# Patient Record
Sex: MALE | Race: BLACK OR AFRICAN AMERICAN | Employment: OTHER | URBAN - METROPOLITAN AREA
[De-identification: names, ages, dates, MRNs, and addresses within clinical notes are randomized per-mention and may not be internally consistent; named-entity substitution may affect disease eponyms.]

---

## 2017-01-04 ENCOUNTER — OFFICE VISIT (OUTPATIENT)
Dept: INTERNAL MEDICINE CLINIC | Age: 67
End: 2017-01-04

## 2017-01-04 VITALS
BODY MASS INDEX: 25.6 KG/M2 | DIASTOLIC BLOOD PRESSURE: 84 MMHG | WEIGHT: 193.2 LBS | TEMPERATURE: 98.4 F | HEIGHT: 73 IN | SYSTOLIC BLOOD PRESSURE: 140 MMHG | HEART RATE: 72 BPM

## 2017-01-04 DIAGNOSIS — I10 ESSENTIAL HYPERTENSION: ICD-10-CM

## 2017-01-04 DIAGNOSIS — Z23 ENCOUNTER FOR IMMUNIZATION: Primary | ICD-10-CM

## 2017-01-04 DIAGNOSIS — E55.9 VITAMIN D INSUFFICIENCY: ICD-10-CM

## 2017-01-04 DIAGNOSIS — E11.9 TYPE 2 DIABETES MELLITUS WITHOUT COMPLICATION, WITHOUT LONG-TERM CURRENT USE OF INSULIN (HCC): ICD-10-CM

## 2017-01-04 DIAGNOSIS — R22.31 ARM MASS, RIGHT: ICD-10-CM

## 2017-01-04 DIAGNOSIS — Z80.42 FAMILY HISTORY OF PROSTATE CANCER: ICD-10-CM

## 2017-01-04 DIAGNOSIS — I82.432 DEEP VEIN THROMBOSIS (DVT) OF POPLITEAL VEIN OF LEFT LOWER EXTREMITY, UNSPECIFIED CHRONICITY (HCC): ICD-10-CM

## 2017-01-04 DIAGNOSIS — Z12.5 SCREENING PSA (PROSTATE SPECIFIC ANTIGEN): ICD-10-CM

## 2017-01-04 DIAGNOSIS — E78.5 HYPERLIPIDEMIA, UNSPECIFIED HYPERLIPIDEMIA TYPE: ICD-10-CM

## 2017-01-04 DIAGNOSIS — K21.9 GASTROESOPHAGEAL REFLUX DISEASE, ESOPHAGITIS PRESENCE NOT SPECIFIED: ICD-10-CM

## 2017-01-04 NOTE — PATIENT INSTRUCTIONS
Please monitor and record your blood pressure every morning for the next 2 weeks prior to taking your medications. Please deliver record of readings to our office for review. Learning About Diabetes Food Guidelines  Your Care Instructions  Meal planning is important to manage diabetes. It helps keep your blood sugar at a target level (which you set with your doctor). You don't have to eat special foods. You can eat what your family eats, including sweets once in a while. But you do have to pay attention to how often you eat and how much you eat of certain foods. You may want to work with a dietitian or a certified diabetes educator (CDE) to help you plan meals and snacks. A dietitian or CDE can also help you lose weight if that is one of your goals. What should you know about eating carbs? Managing the amount of carbohydrate (carbs) you eat is an important part of healthy meals when you have diabetes. Carbohydrate is found in many foods. · Learn which foods have carbs. And learn the amounts of carbs in different foods. ¨ Bread, cereal, pasta, and rice have about 15 grams of carbs in a serving. A serving is 1 slice of bread (1 ounce), ½ cup of cooked cereal, or 1/3 cup of cooked pasta or rice. ¨ Fruits have 15 grams of carbs in a serving. A serving is 1 small fresh fruit, such as an apple or orange; ½ of a banana; ½ cup of cooked or canned fruit; ½ cup of fruit juice; 1 cup of melon or raspberries; or 2 tablespoons of dried fruit. ¨ Milk and no-sugar-added yogurt have 15 grams of carbs in a serving. A serving is 1 cup of milk or 2/3 cup of no-sugar-added yogurt. ¨ Starchy vegetables have 15 grams of carbs in a serving. A serving is ½ cup of mashed potatoes or sweet potato; 1 cup winter squash; ½ of a small baked potato; ½ cup of cooked beans; or ½ cup cooked corn or green peas.   · Learn how much carbs to eat each day and at each meal. A dietitian or CDE can teach you how to keep track of the amount of carbs you eat. This is called carbohydrate counting. · If you are not sure how to count carbohydrate grams, use the Plate Method to plan meals. It is a good, quick way to make sure that you have a balanced meal. It also helps you spread carbs throughout the day. ¨ Divide your plate by types of foods. Put non-starchy vegetables on half the plate, meat or other protein food on one-quarter of the plate, and a grain or starchy vegetable in the final quarter of the plate. To this you can add a small piece of fruit and 1 cup of milk or yogurt, depending on how many carbs you are supposed to eat at a meal.  · Try to eat about the same amount of carbs at each meal. Do not \"save up\" your daily allowance of carbs to eat at one meal.  · Proteins have very little or no carbs per serving. Examples of proteins are beef, chicken, turkey, fish, eggs, tofu, cheese, cottage cheese, and peanut butter. A serving size of meat is 3 ounces, which is about the size of a deck of cards. Examples of meat substitute serving sizes (equal to 1 ounce of meat) are 1/4 cup of cottage cheese, 1 egg, 1 tablespoon of peanut butter, and ½ cup of tofu. How can you eat out and still eat healthy? · Learn to estimate the serving sizes of foods that have carbohydrate. If you measure food at home, it will be easier to estimate the amount in a serving of restaurant food. · If the meal you order has too much carbohydrate (such as potatoes, corn, or baked beans), ask to have a low-carbohydrate food instead. Ask for a salad or green vegetables. · If you use insulin, check your blood sugar before and after eating out to help you plan how much to eat in the future. · If you eat more carbohydrate at a meal than you had planned, take a walk or do other exercise. This will help lower your blood sugar. What else should you know? · Limit saturated fat, such as the fat from meat and dairy products.  This is a healthy choice because people who have diabetes are at higher risk of heart disease. So choose lean cuts of meat and nonfat or low-fat dairy products. Use olive or canola oil instead of butter or shortening when cooking. · Don't skip meals. Your blood sugar may drop too low if you skip meals and take insulin or certain medicines for diabetes. · Check with your doctor before you drink alcohol. Alcohol can cause your blood sugar to drop too low. Alcohol can also cause a bad reaction if you take certain diabetes medicines. Follow-up care is a key part of your treatment and safety. Be sure to make and go to all appointments, and call your doctor if you are having problems. It's also a good idea to know your test results and keep a list of the medicines you take. Where can you learn more? Go to http://yanet-sergio.info/. Enter L563 in the search box to learn more about \"Learning About Diabetes Food Guidelines. \"  Current as of: May 23, 2016  Content Version: 11.1  © 1663-4349 [employer]. Care instructions adapted under license by [employer] (which disclaims liability or warranty for this information). If you have questions about a medical condition or this instruction, always ask your healthcare professional. [address] any warranty or liability for your use of this information. DASH Diet: Care Instructions  Your Care Instructions  The DASH diet is an eating plan that can help lower your blood pressure. DASH stands for Dietary Approaches to Stop Hypertension. Hypertension is high blood pressure. The DASH diet focuses on eating foods that are high in calcium, potassium, and magnesium. These nutrients can lower blood pressure. The foods that are highest in these nutrients are fruits, vegetables, low-fat dairy products, nuts, seeds, and legumes. But taking calcium, potassium, and magnesium supplements instead of eating foods that are high in those nutrients does not have the same effect.  The DASH diet also includes whole grains, fish, and poultry. The DASH diet is one of several lifestyle changes your doctor may recommend to lower your high blood pressure. Your doctor may also want you to decrease the amount of sodium in your diet. Lowering sodium while following the DASH diet can lower blood pressure even further than just the DASH diet alone. Follow-up care is a key part of your treatment and safety. Be sure to make and go to all appointments, and call your doctor if you are having problems. It's also a good idea to know your test results and keep a list of the medicines you take. How can you care for yourself at home? Following the DASH diet  · Eat 4 to 5 servings of fruit each day. A serving is 1 medium-sized piece of fruit, ½ cup chopped or canned fruit, 1/4 cup dried fruit, or 4 ounces (½ cup) of fruit juice. Choose fruit more often than fruit juice. · Eat 4 to 5 servings of vegetables each day. A serving is 1 cup of lettuce or raw leafy vegetables, ½ cup of chopped or cooked vegetables, or 4 ounces (½ cup) of vegetable juice. Choose vegetables more often than vegetable juice. · Get 2 to 3 servings of low-fat and fat-free dairy each day. A serving is 8 ounces of milk, 1 cup of yogurt, or 1 ½ ounces of cheese. · Eat 6 to 8 servings of grains each day. A serving is 1 slice of bread, 1 ounce of dry cereal, or ½ cup of cooked rice, pasta, or cooked cereal. Try to choose whole-grain products as much as possible. · Limit lean meat, poultry, and fish to 2 servings each day. A serving is 3 ounces, about the size of a deck of cards. · Eat 4 to 5 servings of nuts, seeds, and legumes (cooked dried beans, lentils, and split peas) each week. A serving is 1/3 cup of nuts, 2 tablespoons of seeds, or ½ cup of cooked beans or peas. · Limit fats and oils to 2 to 3 servings each day. A serving is 1 teaspoon of vegetable oil or 2 tablespoons of salad dressing.   · Limit sweets and added sugars to 5 servings or less a week. A serving is 1 tablespoon jelly or jam, ½ cup sorbet, or 1 cup of lemonade. · Eat less than 2,300 milligrams (mg) of sodium a day. If you limit your sodium to 1,500 mg a day, you can lower your blood pressure even more. Tips for success  · Start small. Do not try to make dramatic changes to your diet all at once. You might feel that you are missing out on your favorite foods and then be more likely to not follow the plan. Make small changes, and stick with them. Once those changes become habit, add a few more changes. · Try some of the following:  ¨ Make it a goal to eat a fruit or vegetable at every meal and at snacks. This will make it easy to get the recommended amount of fruits and vegetables each day. ¨ Try yogurt topped with fruit and nuts for a snack or healthy dessert. ¨ Add lettuce, tomato, cucumber, and onion to sandwiches. ¨ Combine a ready-made pizza crust with low-fat mozzarella cheese and lots of vegetable toppings. Try using tomatoes, squash, spinach, broccoli, carrots, cauliflower, and onions. ¨ Have a variety of cut-up vegetables with a low-fat dip as an appetizer instead of chips and dip. ¨ Sprinkle sunflower seeds or chopped almonds over salads. Or try adding chopped walnuts or almonds to cooked vegetables. ¨ Try some vegetarian meals using beans and peas. Add garbanzo or kidney beans to salads. Make burritos and tacos with mashed blake beans or black beans. Where can you learn more? Go to http://yanet-sergio.info/. Enter H679 in the search box to learn more about \"DASH Diet: Care Instructions. \"  Current as of: March 23, 2016  Content Version: 11.1  © 5874-8111 [employer]. Care instructions adapted under license by [employer] (which disclaims liability or warranty for this information).  If you have questions about a medical condition or this instruction, always ask your healthcare professional. Jennifer Solitario disclaims any warranty or liability for your use of this information.

## 2017-01-04 NOTE — PROGRESS NOTES
1. Have you been to the ER, urgent care clinic or hospitalized since your last visit? NO.     2. Have you seen or consulted any other health care providers outside of the [address] [place] [address] since your last visit (Include any pap smears or colon screening)? YES      Do you have an Advanced Directive? NO    Would you like information on Advanced Directives?  YES

## 2017-01-04 NOTE — MR AVS SNAPSHOT
Visit Information Date & Time Provider Department Dept. Phone Encounter #  
 1/4/2017  9:00 AM Vince Goldberg, MD Internist of [facility]  Follow-up Instructions Return in about 6 months (around 7/4/2017), or if symptoms worsen or fail to improve. Your Appointments 7/5/2017  8:25 AM  
LAB with [facility] NURSE VISIT Internist of [facility] ([facility]) Appt Note: lab  
 [address], [address] [id number] [address] [place]  
  
   
 [address], [address]  
  
    
 7/12/2017  8:30 AM  
Office Visit with Vince Goldberg, MD  
Internist of [facility]) Appt Note: 6 month f/u  
 [address] [facility], [address] [id number] [address]  
  
   
 [address], [address] Upcoming Health Maintenance Date Due MICROALBUMIN Q1 8/31/1962 EYE EXAM RETINAL OR DILATED Q1 8/31/1962 Pneumococcal 19-64 Medium Risk (1 of 1 - PPSV23) 8/31/1971 DTaP/Tdap/Td series (1 - Tdap) 9/8/2007 INFLUENZA AGE 9 TO ADULT 8/1/2016 HEMOGLOBIN A1C Q6M 8/10/2016 LIPID PANEL Q1 2/10/2017 FOOT EXAM Q1 2/17/2017 COLONOSCOPY 11/11/2021 Allergies as of 1/4/2017  Review Complete On: 1/4/2017 By: Kandy Turk No Known Allergies Current Immunizations  Reviewed on 1/4/2017 Name Date  
 TD Vaccine 9/7/2007 Tdap 1/4/2017 Zoster Vaccine, Live 7/30/2013 Reviewed by Kandy Turk on 1/4/2017 at  9:42 AM  
You Were Diagnosed With   
  
 Codes Comments Encounter for immunization    -  Primary ICD-10-CM: Z48 ICD-9-CM: V03.89 Arm mass, right     ICD-10-CM: R22.31 
ICD-9-CM: 782. 2 Vitals BP Pulse Temp Height(growth percentile) Weight(growth percentile) BMI  
 140/84 (BP 1 Location: Left arm, BP Patient Position: Sitting) 72 98.4 °F (36.9 °C) 6' 1.25\" (1.861 m) 193 lb 3.2 oz (87.6 kg) 25.32 kg/m2 Smoking Status Former Smoker Vitals History BMI and BSA Data Body Mass Index Body Surface Area  
 25.32 kg/m 2 2.13 m 2 Preferred Pharmacy Pharmacy Name Phone [facility], [address] Your Updated Medication List  
  
   
This list is accurate as of: 1/4/17  9:43 AM.  Always use your most recent med list.  
  
  
  
  
 cholecalciferol 1,000 unit Cap Commonly known as:  VITAMIN D3 Take 1,000 Units by mouth daily. simvastatin 10 mg tablet Commonly known as:  ZOCOR Take 1 Tab by mouth nightly. Follow-up Instructions Return in about 6 months (around 7/4/2017), or if symptoms worsen or fail to improve. Referral Information Referral ID Referred By Referred To  
  
 [id number] Farrukh Shen MD   
   [address] [facility]   
   [address] [facility] [place], [address] Phone: [phone] Fax: [phone] Visits Status Start Date End Date 1 New Request 1/4/17 1/4/18 If your referral has a status of pending review or denied, additional information will be sent to support the outcome of this decision. Patient Instructions Please monitor and record your blood pressure every morning for the next 2 weeks prior to taking your medications. Please deliver record of readings to our office for review. Learning About Diabetes Food Guidelines Your Care Instructions Meal planning is important to manage diabetes. It helps keep your blood sugar at a target level (which you set with your doctor). You don't have to eat special foods. You can eat what your family eats, including sweets once in a while. But you do have to pay attention to how often you eat and how much you eat of certain foods. You may want to work with a dietitian or a certified diabetes educator (CDE) to help you plan meals and snacks.  A dietitian or CDE can also help you lose weight if that is one of your goals. What should you know about eating carbs? Managing the amount of carbohydrate (carbs) you eat is an important part of healthy meals when you have diabetes. Carbohydrate is found in many foods. · Learn which foods have carbs. And learn the amounts of carbs in different foods. ¨ Bread, cereal, pasta, and rice have about 15 grams of carbs in a serving. A serving is 1 slice of bread (1 ounce), ½ cup of cooked cereal, or 1/3 cup of cooked pasta or rice. ¨ Fruits have 15 grams of carbs in a serving. A serving is 1 small fresh fruit, such as an apple or orange; ½ of a banana; ½ cup of cooked or canned fruit; ½ cup of fruit juice; 1 cup of melon or raspberries; or 2 tablespoons of dried fruit. ¨ Milk and no-sugar-added yogurt have 15 grams of carbs in a serving. A serving is 1 cup of milk or 2/3 cup of no-sugar-added yogurt. ¨ Starchy vegetables have 15 grams of carbs in a serving. A serving is ½ cup of mashed potatoes or sweet potato; 1 cup winter squash; ½ of a small baked potato; ½ cup of cooked beans; or ½ cup cooked corn or green peas. · Learn how much carbs to eat each day and at each meal. A dietitian or CDE can teach you how to keep track of the amount of carbs you eat. This is called carbohydrate counting. · If you are not sure how to count carbohydrate grams, use the Plate Method to plan meals. It is a good, quick way to make sure that you have a balanced meal. It also helps you spread carbs throughout the day. ¨ Divide your plate by types of foods. Put non-starchy vegetables on half the plate, meat or other protein food on one-quarter of the plate, and a grain or starchy vegetable in the final quarter of the plate.  To this you can add a small piece of fruit and 1 cup of milk or yogurt, depending on how many carbs you are supposed to eat at a meal. 
· Try to eat about the same amount of carbs at each meal. Do not \"save up\" your daily allowance of carbs to eat at one meal. 
· Proteins have very little or no carbs per serving. Examples of proteins are beef, chicken, turkey, fish, eggs, tofu, cheese, cottage cheese, and peanut butter. A serving size of meat is 3 ounces, which is about the size of a deck of cards. Examples of meat substitute serving sizes (equal to 1 ounce of meat) are 1/4 cup of cottage cheese, 1 egg, 1 tablespoon of peanut butter, and ½ cup of tofu. How can you eat out and still eat healthy? · Learn to estimate the serving sizes of foods that have carbohydrate. If you measure food at home, it will be easier to estimate the amount in a serving of restaurant food. · If the meal you order has too much carbohydrate (such as potatoes, corn, or baked beans), ask to have a low-carbohydrate food instead. Ask for a salad or green vegetables. · If you use insulin, check your blood sugar before and after eating out to help you plan how much to eat in the future. · If you eat more carbohydrate at a meal than you had planned, take a walk or do other exercise. This will help lower your blood sugar. What else should you know? · Limit saturated fat, such as the fat from meat and dairy products. This is a healthy choice because people who have diabetes are at higher risk of heart disease. So choose lean cuts of meat and nonfat or low-fat dairy products. Use olive or canola oil instead of butter or shortening when cooking. · Don't skip meals. Your blood sugar may drop too low if you skip meals and take insulin or certain medicines for diabetes. · Check with your doctor before you drink alcohol. Alcohol can cause your blood sugar to drop too low. Alcohol can also cause a bad reaction if you take certain diabetes medicines. Follow-up care is a key part of your treatment and safety.  Be sure to make and go to all appointments, and call your doctor if you are having problems. It's also a good idea to know your test results and keep a list of the medicines you take. Where can you learn more? Go to http://yanet-sergio.info/. Enter B394 in the search box to learn more about \"Learning About Diabetes Food Guidelines. \" Current as of: May 23, 2016 Content Version: 11.1 © 1922-3422 [employer]. Care instructions adapted under license by [employer] (which disclaims liability or warranty for this information). If you have questions about a medical condition or this instruction, always ask your healthcare professional. [place] [address] any warranty or liability for your use of this information. DASH Diet: Care Instructions Your Care Instructions The DASH diet is an eating plan that can help lower your blood pressure. DASH stands for Dietary Approaches to Stop Hypertension. Hypertension is high blood pressure. The DASH diet focuses on eating foods that are high in calcium, potassium, and magnesium. These nutrients can lower blood pressure. The foods that are highest in these nutrients are fruits, vegetables, low-fat dairy products, nuts, seeds, and legumes. But taking calcium, potassium, and magnesium supplements instead of eating foods that are high in those nutrients does not have the same effect. The DASH diet also includes whole grains, fish, and poultry. The DASH diet is one of several lifestyle changes your doctor may recommend to lower your high blood pressure. Your doctor may also want you to decrease the amount of sodium in your diet. Lowering sodium while following the DASH diet can lower blood pressure even further than just the DASH diet alone. Follow-up care is a key part of your treatment and safety. Be sure to make and go to all appointments, and call your doctor if you are having problems. It's also a good idea to know your test results and keep a list of the medicines you take. How can you care for yourself at home? Following the DASH diet · Eat 4 to 5 servings of fruit each day. A serving is 1 medium-sized piece of fruit, ½ cup chopped or canned fruit, 1/4 cup dried fruit, or 4 ounces (½ cup) of fruit juice. Choose fruit more often than fruit juice. · Eat 4 to 5 servings of vegetables each day. A serving is 1 cup of lettuce or raw leafy vegetables, ½ cup of chopped or cooked vegetables, or 4 ounces (½ cup) of vegetable juice. Choose vegetables more often than vegetable juice. · Get 2 to 3 servings of low-fat and fat-free dairy each day. A serving is 8 ounces of milk, 1 cup of yogurt, or 1 ½ ounces of cheese. · Eat 6 to 8 servings of grains each day. A serving is 1 slice of bread, 1 ounce of dry cereal, or ½ cup of cooked rice, pasta, or cooked cereal. Try to choose whole-grain products as much as possible. · Limit lean meat, poultry, and fish to 2 servings each day. A serving is 3 ounces, about the size of a deck of cards. · Eat 4 to 5 servings of nuts, seeds, and legumes (cooked dried beans, lentils, and split peas) each week. A serving is 1/3 cup of nuts, 2 tablespoons of seeds, or ½ cup of cooked beans or peas. · Limit fats and oils to 2 to 3 servings each day. A serving is 1 teaspoon of vegetable oil or 2 tablespoons of salad dressing. · Limit sweets and added sugars to 5 servings or less a week. A serving is 1 tablespoon jelly or jam, ½ cup sorbet, or 1 cup of lemonade. · Eat less than 2,300 milligrams (mg) of sodium a day. If you limit your sodium to 1,500 mg a day, you can lower your blood pressure even more. Tips for success · Start small. Do not try to make dramatic changes to your diet all at once. You might feel that you are missing out on your favorite foods and then be more likely to not follow the plan. Make small changes, and stick with them. Once those changes become habit, add a few more changes. · Try some of the following: ¨ Make it a goal to eat a fruit or vegetable at every meal and at snacks. This will make it easy to get the recommended amount of fruits and vegetables each day. ¨ Try yogurt topped with fruit and nuts for a snack or healthy dessert. ¨ Add lettuce, tomato, cucumber, and onion to sandwiches. ¨ Combine a ready-made pizza crust with low-fat mozzarella cheese and lots of vegetable toppings. Try using tomatoes, squash, spinach, broccoli, carrots, cauliflower, and onions. ¨ Have a variety of cut-up vegetables with a low-fat dip as an appetizer instead of chips and dip. ¨ Sprinkle sunflower seeds or chopped almonds over salads. Or try adding chopped walnuts or almonds to cooked vegetables. ¨ Try some vegetarian meals using beans and peas. Add garbanzo or kidney beans to salads. Make burritos and tacos with mashed blake beans or black beans. Where can you learn more? Go to http://yanetKeclonsergio.info/. Enter K901 in the search box to learn more about \"DASH Diet: Care Instructions. \" Current as of: March 23, 2016 Content Version: 11.1 © 5955-5821 [employer]en, [employer]. Care instructions adapted under license by [employer] (which disclaims liability or warranty for this information). If you have questions about a medical condition or this instruction, always ask your healthcare professional. [place] [address] any warranty or liability for your use of this information. Introducing [facility]! Dear Lexie Lozano: Thank you for requesting a DropThought account. Our records indicate that you already have an active DropThought account. You can access your account anytime at [email or web address] Did you know that you can access your hospital and ER discharge instructions at any time in DropThought? You can also review all of your test results from your hospital stay or ER visit. Additional Information If you have questions, please visit the Frequently Asked Questions section of the [employer] website at [email or web address] Remember, kajeet is NOT to be used for urgent needs. For medical emergencies, dial 911. Now available from your iPhone and Android! Please provide this summary of care documentation to your next provider. Your primary care clinician is listed as Vy Hopson. If you have any questions after today's visit, please call [phone].

## 2017-01-08 ENCOUNTER — TELEPHONE (OUTPATIENT)
Dept: INTERNAL MEDICINE CLINIC | Age: 67
End: 2017-01-08

## 2017-01-08 PROBLEM — K21.9 GERD (GASTROESOPHAGEAL REFLUX DISEASE): Status: ACTIVE | Noted: 2017-01-08

## 2017-01-08 PROBLEM — R22.31 MASS OF RIGHT UPPER EXTREMITY: Status: ACTIVE | Noted: 2017-01-08

## 2017-01-08 PROBLEM — R22.30 ARM MASS: Status: ACTIVE | Noted: 2017-01-08

## 2017-01-08 PROBLEM — E55.9 VITAMIN D INSUFFICIENCY: Status: ACTIVE | Noted: 2017-01-08

## 2017-01-08 NOTE — PROGRESS NOTES
HPI:   Mike Sequeira is a 61y.o. year old male who presents today for evaluation of prehypertension, hyperlipidemia, diabetes mellitus, prior provoked DVT, and GERD. He reports that he is doing well and is currently without complaints. He has a history of prehypertension, not treated with medications. He states that he will intermittently monitor his blood pressure at home and it usually ranges in the 140's/70's. He walks several days per week for exercise, and denies any chest pain, shortness of breath at rest or with exertion, palpitations, lightheadedness, or edema. He also has a history of hyperlipidemia, treated with low intensity dose simvastatin. He has a history of diabetes mellitus, recently diagnosed in 2/2016 with HbA1c of 6.5. Fasting blood glucose have been elevated since 2010 ranging from 107-128. He denies any polyuria, polydipsia, nocturia, or blurry vision, and has no history of retinopathy, neuropathy, or nephropathy. He has regular eye exams at [facility] on [facility], with last exam in 11/2016. He has a history of a provoked DVT, occurring in 8/2012 following an airplane ride home from [facility]. Venous duplex scan (9/1/2012) at [facility] showed an acute nonocclusive DVT in the left popliteal vein, and an acute thrombus in the left gastrocnemius and soleus veins. He was treated with Lovenox and transitioned to coumadin and completed three months of therapy. 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 He reports occasional mild swelling in his left foot, but denies any calf pain or tenderness, shortness of breath, or pleuritic chest pain. He has had multiple long plane rides since that time without recurrence. He has a history of GERD, but uses over the counter antacids occasionally. He also has a reported history of a gastric ulcer, but records are unavailable.  He had a screening colonoscopy in 11/2011 by Dr. Cardona Records showing mild diverticulosis, a 2 mm sessile polyp in the mid sigmoid colon (fulgurized), and a 5 mm polyp in the proximal sigmoid colon (pathology unavailable). Follow-up recommended for 5 years. Patient reports that he is currently scheduling appointment for repeat colonoscopy. He denies any abdominal pain, nausea, vomiting, melena, hematochezia, or change in bowel movements. Past Medical History   Diagnosis Date    Colon polyps     Diabetes mellitus ([facility] [address]     FH: prostate cancer     GERD (gastroesophageal reflux disease)     History of DVT (deep vein thrombosis) 2012     Provoked following airplane ride home from [place]. Venous duplex scan showed acute nonocclusive DVT in left popliteal vein; acute thrombosis left gastocnemius and soleus vein.  Hypercholesterolemia     Hypertension      History reviewed. No pertinent past surgical history. Current Outpatient Prescriptions   Medication Sig    simvastatin (ZOCOR) 10 mg tablet Take 1 Tab by mouth nightly.  cholecalciferol (VITAMIN D3) 1,000 unit cap Take 1,000 Units by mouth daily. No current facility-administered medications for this visit. Allergies and Intolerances:   No Known Allergies     Family History: His mother is alive and has DM. His father  of a stroke at age 80. He also had prostate cancer. His brother has prostate cancer metastatic to bone. Family History   Problem Relation Age of Onset    Diabetes Mother     Cancer Father     Stroke Father     Hypertension Father     Diabetes Brother      Social History:   He  reports that he quit smoking about 18 years ago. He has never used smokeless tobacco. He smoked 0.5 ppd for 20 years, stopping in . He is  and has one living son, and one son who is . 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    History   Alcohol Use No     Immunization History:  Immunization History   Administered Date(s) Administered    TD Vaccine 2007    Tdap 2017  Zoster Vaccine, Live 07/30/2013       Review of Systems:   As above included in HPI. Otherwise 11 point review of systems negative including constitutional, skin, HENT, eyes, respiratory, cardiovascular, gastrointestinal, genitourinary, musculoskeletal, endocrine, hematologic, allergy, and neurologic. Physical:   Vitals:   BP: 140/84   HR: 72  WT: 193 lb 3.2 oz (87.6 kg)  BMI:  25.32 kg/m2    Exam:   Patient appears in no apparent distress. Affect is appropriate. HEENT --Anicteric sclerae, tympanic membranes normal,  ear canals normal.  PERRL, EOMI, conjunctiva and lids normal.   Sinuses were nontender, turbinates normal, hearing normal.  Oropharynx without  erythema, normal tongue, oral mucosa and tonsils. No cervical lymphadenopathy. No thyromegaly, JVD, or bruits. Carotid pulses 2+ with normal upstroke. Lungs --Clear to auscultation. No wheezing or rales. Heart --Regular rate and rhythm, no murmurs, rubs, gallops, or clicks. Chest wall --Nontender to palpation. PMI normal.  Abdomen -- Soft and nontender, no hepatosplenomegaly or masses. Extremities -- Without cyanosis, clubbing, edema. 2+ pulses equally and bilaterally. Normal looking digits, ROM intact. Right arm with large sharply circumscribed mass on posterior aspect. Neuro -- CN 2-12 intact, strength 5/5 with intact soft touch in all extremities, cerebellar  exam finger to nose test normal, 2+DTRs  Derm - no obvious abnormalities noted, no rash    Review of Data:  Labs:  No visits with results within 1 Month(s) from this visit.   Latest known visit with results is:    Hospital Outpatient Visit on 02/10/2016   Component Date Value Ref Range Status    Hepatitis C virus Ab 02/10/2016 <0.02  <0.80 Index Final    Hep C  virus Ab Interp. 02/10/2016 NEGATIVE   NEG   Final    Hep C  virus Ab comment 02/10/2016        Final    WBC 02/10/2016 3.2* 4.6 - 13.2 K/uL Final    RBC 02/10/2016 5.35  4.70 - 5.50 M/uL Final    HGB 02/10/2016 14.5  13.0 - 16.0 g/dL Final    HCT 02/10/2016 46.8  36.0 - 48.0 % Final    MCV 02/10/2016 87.5  74.0 - 97.0 FL Final    MCH 02/10/2016 27.1  24.0 - 34.0 PG Final    MCHC 02/10/2016 31.0  31.0 - 37.0 g/dL Final    RDW 02/10/2016 13.4  11.6 - 14.5 % Final    PLATELET 71/52/4056 564  135 - 420 K/uL Final    MPV 02/10/2016 10.5  9.2 - 11.8 FL Final    NEUTROPHILS 02/10/2016 43  40 - 73 % Final    LYMPHOCYTES 02/10/2016 46  21 - 52 % Final    MONOCYTES 02/10/2016 9  3 - 10 % Final    EOSINOPHILS 02/10/2016 2  0 - 5 % Final    BASOPHILS 02/10/2016 0  0 - 2 % Final    ABS. NEUTROPHILS 02/10/2016 1.4* 1.8 - 8.0 K/UL Final    ABS. LYMPHOCYTES 02/10/2016 1.5  0.9 - 3.6 K/UL Final    ABS. MONOCYTES 02/10/2016 0.3  0.05 - 1.2 K/UL Final    ABS. EOSINOPHILS 02/10/2016 0.1  0.0 - 0.4 K/UL Final    ABS.  BASOPHILS 02/10/2016 0.0  0.0 - 0.06 K/UL Final    DF 02/10/2016 AUTOMATED    Final    LIPID PROFILE 02/10/2016        Final    Cholesterol, total 02/10/2016 166  <200 MG/DL Final    Triglyceride 02/10/2016 67  <150 MG/DL Final    HDL Cholesterol 02/10/2016 46  40 - 60 MG/DL Final    LDL, calculated 02/10/2016 106.6* 0 - 100 MG/DL Final    VLDL, calculated 02/10/2016 13.4  MG/DL Final    CHOL/HDL Ratio 02/10/2016 3.6  0 - 5.0   Final    Sodium 02/10/2016 144  136 - 145 mmol/L Final    Potassium 02/10/2016 4.5  3.5 - 5.5 mmol/L Final    Chloride 02/10/2016 109* 100 - 108 mmol/L Final    CO2 02/10/2016 30  21 - 32 mmol/L Final    Anion gap 02/10/2016 5  3.0 - 18 mmol/L Final    Glucose 02/10/2016 112* 74 - 99 mg/dL Final    BUN 02/10/2016 17  7.0 - 18 MG/DL Final    Creatinine 02/10/2016 0.97  0.6 - 1.3 MG/DL Final    BUN/Creatinine ratio 02/10/2016 18  12 - 20   Final    GFR est AA 02/10/2016 >60  >60 ml/min/1.73m2 Final    GFR est non-AA 02/10/2016 >60  >60 ml/min/1.73m2 Final    Calcium 02/10/2016 8.9  8.5 - 10.1 MG/DL Final    Bilirubin, total 02/10/2016 0.5  0.2 - 1.0 MG/DL Final    ALT 02/10/2016 31 16 - 61 U/L Final    AST 02/10/2016 17  15 - 37 U/L Final    Alk. phosphatase 02/10/2016 61  45 - 117 U/L Final    Protein, total 02/10/2016 6.9  6.4 - 8.2 g/dL Final    Albumin 02/10/2016 3.9  3.4 - 5.0 g/dL Final    Globulin 02/10/2016 3.0  2.0 - 4.0 g/dL Final    A-G Ratio 02/10/2016 1.3  0.8 - 1.7   Final    Prostate Specific Ag 02/10/2016 1.0  0.0 - 4.0 ng/mL Final    TSH 02/10/2016 0.35* 0.36 - 3.74 uIU/mL Final    Color 02/10/2016 YELLOW    Final    Appearance 02/10/2016 CLEAR    Final    Specific gravity 02/10/2016 >1.030* 1.005 - 1.030 Final    pH (UA) 02/10/2016 7.0  5.0 - 8.0   Final    Protein 02/10/2016 NEGATIVE   NEG mg/dL Final    Glucose 02/10/2016 NEGATIVE   NEG mg/dL Final    Ketone 02/10/2016 NEGATIVE   NEG mg/dL Final    Bilirubin 02/10/2016 NEGATIVE   NEG   Final    Blood 02/10/2016 NEGATIVE   NEG   Final    Urobilinogen 02/10/2016 1.0  0.2 - 1.0 EU/dL Final    Nitrites 02/10/2016 NEGATIVE   NEG   Final    Leukocyte Esterase 02/10/2016 NEGATIVE   NEG   Final    Hemoglobin A1c 02/10/2016 6.5* 4.2 - 5.6 % Final    Est. average glucose 02/10/2016 140  mg/dL Final    Vitamin D 25-Hydroxy 02/10/2016 27.4* 30 - 100 ng/mL Final     Labs 12/28/2016:  Creatinine 1.01/ eGFR 91     Glucose 120     TSH 0.78/ free T4 1.2     HbA1c 6.3    Health Maintenance:  Screening:    Colorectal: colonoscopy (11/2011) colon polyps. Dr. Dean Morales. Due 11/2016.    Depression: none   DM (HbA1c/FPG): HbA1c 6.3 (12/2016)   Hepatitis C: negative (2/2016)   Falls: none   DEXA: N/A   PSA/ANU: PSA 1.0/ ANU (2/2016)   Glaucoma: regular eye exams (last 2/2016)   Smoking: distant past   Vitamin D: 27.4 (2/2016)   Medicare Wellness: N/A    Impression:  Patient Active Problem List   Diagnosis Code    Hyperlipidemia E78.5    Family history of prostate cancer Z80.42    Colon polyps K63.5    Gastric ulcer K25.9    Deep vein thrombosis (DVT); left popliteal, gastrocnemius, soleus veins 9/2012 I82.432    Diabetes mellitus ([facility] [address] E11.9    Essential hypertension I10    Mass of right upper extremity R22.31    GERD (gastroesophageal reflux disease) K21.9    Vitamin D insufficiency E55.9       Plan:  1. Hypertension. Blood pressure elevated today and reports that SBP at home in the 140's although not checking it regularly. Instructed to monitor and record his blood pressure every morning for the next 2 weeks and deliver record of readings to our office for review. If antihypertensive is needed, will use diuretic or calcium channel blocker as first line given JNC 8 recommendations to not use Ace-I or ARB as first line in black patients unless evidence of chronic kidney disease exists given increased stroke and cardiovascular risk. Renal function normal with creatinine 1.01/ eGFR 91. Continue to follow. 2. Hyperlipidemia. On low intensity dose simvastatin with  in 2/2016. Will recheck next visit and adjust dose accordingly. Goal LDL would be < 70 in this patient with diabetes mellitus. Emphasized importance of lifestyle modifications, including diet, exercise, and weight loss. 3. Diabetes mellitus. Well controlled on dietary conrtol alone. No evidence of microvascular complications. Urine microalbumin/ creatinine ratio without evidence of microalbuminuria (12/2016) On statin, but not on Ace-I or ARB as discussed above. Continue follow-up for annual eye exams. Will perform foot exam at next visit (last 2/2016). Emphasized importance of lifestyle modifications, including diet, exercise, and weight loss. 4. Right upper arm mass. Most likely consistent with lipoma given that it appears to overlie muscle. However, patient reports that it is painful at times and may be getting slightly larger. Will refer to Dr. Caitlin Brown for evaluation. 5. H/O provoked DVT. No evidence of recurrence since 2012. Continue to follow. 6. GERD. Symptomatic control with over the counter antacids. Follow. 7. Health maintenance.  Patient refusing influenza vaccination today. Agreeable to receive Tdap. Other immunizations up to date. Overdue for colonoscopy. Reports that he is in the process of scheduling. Prostate cancer screening up to date. Will perform at next visit with physical. Continue annual eye exams. Will request report from ANNA GILBERT JR. [facility]. Vitamin D level low at last visit. Encouraged to continue supplement, and will recheck at next visit. Patient understands recommendations and agrees with plan.   Follow-up in 6 months for physical.

## 2017-05-31 RX ORDER — SIMVASTATIN 10 MG/1
10 TABLET, FILM COATED ORAL
Qty: 90 TAB | Refills: 3 | Status: SHIPPED | OUTPATIENT
Start: 2017-05-31 | End: 2017-10-12 | Stop reason: SDUPTHER

## 2017-06-14 ENCOUNTER — OFFICE VISIT (OUTPATIENT)
Dept: SURGERY | Age: 67
End: 2017-06-14

## 2017-06-14 VITALS
HEIGHT: 74 IN | BODY MASS INDEX: 26.44 KG/M2 | RESPIRATION RATE: 18 BRPM | WEIGHT: 206 LBS | SYSTOLIC BLOOD PRESSURE: 118 MMHG | HEART RATE: 82 BPM | DIASTOLIC BLOOD PRESSURE: 68 MMHG | TEMPERATURE: 98.1 F

## 2017-06-14 DIAGNOSIS — M79.89 SOFT TISSUE MASS: Primary | ICD-10-CM

## 2017-06-14 DIAGNOSIS — R22.31 ARM MASS, RIGHT: ICD-10-CM

## 2017-06-14 RX ORDER — SODIUM CHLORIDE 0.9 % (FLUSH) 0.9 %
5-10 SYRINGE (ML) INJECTION AS NEEDED
Status: CANCELLED | OUTPATIENT
Start: 2017-06-14

## 2017-06-14 RX ORDER — SODIUM CHLORIDE 0.9 % (FLUSH) 0.9 %
5-10 SYRINGE (ML) INJECTION EVERY 8 HOURS
Status: CANCELLED | OUTPATIENT
Start: 2017-06-14

## 2017-06-14 NOTE — PROGRESS NOTES
HISTORY OF PRESENT ILLNESS  Dahiana De Santiago is a 61 y.o. male. HPI    Review of Systems   Constitutional: Negative. HENT: Negative. Eyes: Negative. Respiratory: Negative. Cardiovascular: Negative. Gastrointestinal: Negative. Genitourinary: Negative. Musculoskeletal: Negative. Skin: Positive for itching. Negative for rash. Neurological: Negative. Endo/Heme/Allergies: Negative. Psychiatric/Behavioral: Negative.         Physical Exam

## 2017-06-14 NOTE — MR AVS SNAPSHOT
Visit Information Date & Time Provider Department Dept. Phone Encounter #  
 6/14/2017  1:00 PM MD ZURI Lara [facility] [phone] [id number] Your Appointments 7/5/2017  8:25 AM  
LAB with C NURSE VISIT Internist of [facility] ([address] [place] [address]) Appt Note: lab  
 [address], [address] [id number] [address] [place]  
  
   
 [address], [address] 7/11/2017 11:00 AM  
POST OP with [facility] ([address]) Appt Note: Re: Post-Op / Excisional biopsy of right upper arm posterior surface mass / [address] [facility] [address] [facility] [address]  
[address] [address]  
  
    
 7/12/2017  8:30 AM  
Office Visit with Vicente Kay MD  
Internist of [address]) Appt Note: 6 month f/u  
 [address] [facility], [address] [id number] [address] [place]  
  
   
 [address], [address] Upcoming Health Maintenance Date Due Pneumococcal 19-64 Medium Risk (1 of 1 - PPSV23) 8/31/1969 LIPID PANEL Q1 2/10/2017 FOOT EXAM Q1 2/17/2017 HEMOGLOBIN A1C Q6M 6/29/2017 INFLUENZA AGE 9 TO ADULT 8/1/2017 EYE EXAM RETINAL OR DILATED Q1 9/28/2017 MICROALBUMIN Q1 12/29/2017 COLONOSCOPY 3/20/2022 DTaP/Tdap/Td series (2 - Td) 1/4/2027 Allergies as of 6/14/2017  Review Complete On: 6/14/2017 By: Brenda Perez LPN No Known Allergies Current Immunizations  Reviewed on 1/4/2017 Name Date  
 TD Vaccine 9/7/2007 Tdap 1/4/2017 Zoster Vaccine, Live 7/30/2013 Not reviewed this visit You Were Diagnosed With   
  
 Codes Comments Soft tissue mass    -  Primary ICD-10-CM: M79.9 ICD-9-CM: 729.90 Vitals BP Pulse Temp Resp Height(growth percentile) Weight(growth percentile)  
 118/68 82 98.1 °F (36.7 °C) (Oral) 18 6' 2\" (1.88 m) 206 lb (93.4 kg) BMI Smoking Status 26.45 kg/m2 Former Smoker Vitals History BMI and BSA Data Body Mass Index Body Surface Area  
 26.45 kg/m 2 2.21 m 2 Preferred Pharmacy Pharmacy Name Phone [facility], [address] [facility] [address] [phone] Your Updated Medication List  
  
   
This list is accurate as of: 6/14/17  2:28 PM.  Always use your most recent med list.  
  
  
  
  
 cholecalciferol 1,000 unit Cap Commonly known as:  VITAMIN D3 Take 1,000 Units by mouth daily. simvastatin 10 mg tablet Commonly known as:  ZOCOR Take 1 Tab by mouth nightly. We Performed the Following SCHEDULE SURGERY [[id number] Custom] Introducing [facility] SERVICES! Dear Jose Borden: Thank you for requesting a [employer] account. Our records indicate that you already have an active [employer] account. You can access your account anytime at [email or web address] Did you know that you can access your hospital and ER discharge instructions at any time in [employer]? You can also review all of your test results from your hospital stay or ER visit. Additional Information If you have questions, please visit the Frequently Asked Questions section of the [employer] website at [email or web address] Remember, [employer] is NOT to be used for urgent needs. For medical emergencies, dial 911. Now available from your iPhone and Android! Please provide this summary of care documentation to your next provider. Your primary care clinician is listed as Kristian Hua. If you have any questions after today's visit, please call [phone].

## 2017-06-28 ENCOUNTER — TELEPHONE (OUTPATIENT)
Dept: SURGERY | Age: 67
End: 2017-06-28

## 2017-06-28 NOTE — TELEPHONE ENCOUNTER
I called [phone] at 4:41pm on 06/28/2017 and spoke with . Alicia Pelletier to inform him that he needs to complete his pre-op testing no later than tomorrow. Mr. Alicia Pelletier understood and will complete them tomorrow. ..  Froylan Morgan

## 2017-06-29 ENCOUNTER — HOSPITAL ENCOUNTER (OUTPATIENT)
Dept: PREADMISSION TESTING | Age: 67
Discharge: HOME | End: 2017-06-29
Payer: COMMERCIAL

## 2017-06-29 ENCOUNTER — TELEPHONE (OUTPATIENT)
Dept: SURGERY | Age: 67
End: 2017-06-29

## 2017-06-29 ENCOUNTER — HOSPITAL ENCOUNTER (OUTPATIENT)
Dept: GENERAL RADIOLOGY | Age: 67
Discharge: HOME | End: 2017-06-29
Payer: COMMERCIAL

## 2017-06-29 ENCOUNTER — ANESTHESIA EVENT (OUTPATIENT)
Dept: SURGERY | Age: 67
End: 2017-06-29
Payer: COMMERCIAL

## 2017-06-29 DIAGNOSIS — M79.89 SOFT TISSUE MASS: ICD-10-CM

## 2017-06-29 LAB
ALBUMIN SERPL BCP-MCNC: 3.8 G/DL (ref 3.4–5)
ALBUMIN/GLOB SERPL: 1.2 {RATIO} (ref 0.8–1.7)
ALP SERPL-CCNC: 62 U/L (ref 45–117)
ALT SERPL-CCNC: 26 U/L (ref 16–61)
ANION GAP BLD CALC-SCNC: 7 MMOL/L (ref 3–18)
AST SERPL W P-5'-P-CCNC: 14 U/L (ref 15–37)
ATRIAL RATE: 63 BPM
BASOPHILS # BLD AUTO: 0 K/UL (ref 0–0.06)
BASOPHILS # BLD: 1 % (ref 0–2)
BILIRUB SERPL-MCNC: 0.6 MG/DL (ref 0.2–1)
BUN SERPL-MCNC: 13 MG/DL (ref 7–18)
BUN/CREAT SERPL: 14 (ref 12–20)
CALCIUM SERPL-MCNC: 8.9 MG/DL (ref 8.5–10.1)
CALCULATED P AXIS, ECG09: 59 DEGREES
CALCULATED R AXIS, ECG10: 65 DEGREES
CALCULATED T AXIS, ECG11: 61 DEGREES
CHLORIDE SERPL-SCNC: 108 MMOL/L (ref 100–108)
CO2 SERPL-SCNC: 28 MMOL/L (ref 21–32)
CREAT SERPL-MCNC: 0.95 MG/DL (ref 0.6–1.3)
DIAGNOSIS, 93000: NORMAL
DIFFERENTIAL METHOD BLD: ABNORMAL
EOSINOPHIL # BLD: 0 K/UL (ref 0–0.4)
EOSINOPHIL NFR BLD: 1 % (ref 0–5)
ERYTHROCYTE [DISTWIDTH] IN BLOOD BY AUTOMATED COUNT: 12.6 % (ref 11.6–14.5)
GLOBULIN SER CALC-MCNC: 3.1 G/DL (ref 2–4)
GLUCOSE SERPL-MCNC: 119 MG/DL (ref 74–99)
HCT VFR BLD AUTO: 43.3 % (ref 36–48)
HGB BLD-MCNC: 14.4 G/DL (ref 13–16)
LYMPHOCYTES # BLD AUTO: 46 % (ref 21–52)
LYMPHOCYTES # BLD: 1.5 K/UL (ref 0.9–3.6)
MCH RBC QN AUTO: 27.4 PG (ref 24–34)
MCHC RBC AUTO-ENTMCNC: 33.3 G/DL (ref 31–37)
MCV RBC AUTO: 82.3 FL (ref 74–97)
MONOCYTES # BLD: 0.3 K/UL (ref 0.05–1.2)
MONOCYTES NFR BLD AUTO: 8 % (ref 3–10)
NEUTS SEG # BLD: 1.4 K/UL (ref 1.8–8)
NEUTS SEG NFR BLD AUTO: 44 % (ref 40–73)
P-R INTERVAL, ECG05: 194 MS
PLATELET # BLD AUTO: 188 K/UL (ref 135–420)
PMV BLD AUTO: 10.3 FL (ref 9.2–11.8)
POTASSIUM SERPL-SCNC: 4.5 MMOL/L (ref 3.5–5.5)
PROT SERPL-MCNC: 6.9 G/DL (ref 6.4–8.2)
Q-T INTERVAL, ECG07: 362 MS
QRS DURATION, ECG06: 84 MS
QTC CALCULATION (BEZET), ECG08: 370 MS
RBC # BLD AUTO: 5.26 M/UL (ref 4.7–5.5)
SODIUM SERPL-SCNC: 143 MMOL/L (ref 136–145)
VENTRICULAR RATE, ECG03: 63 BPM
WBC # BLD AUTO: 3.3 K/UL (ref 4.6–13.2)

## 2017-06-29 PROCEDURE — 93005 ELECTROCARDIOGRAM TRACING: CPT

## 2017-06-29 PROCEDURE — 85025 COMPLETE CBC W/AUTO DIFF WBC: CPT | Performed by: SURGERY

## 2017-06-29 PROCEDURE — 71020 XR CHEST PA LAT: CPT

## 2017-06-29 PROCEDURE — 80053 COMPREHEN METABOLIC PANEL: CPT | Performed by: SURGERY

## 2017-06-29 PROCEDURE — 36415 COLL VENOUS BLD VENIPUNCTURE: CPT | Performed by: SURGERY

## 2017-06-29 NOTE — TELEPHONE ENCOUNTER
I called [phone] at 4:28pm on 06/29/2017 to reach out to Mr. Kolb to confirm his surgery time and arrival time. ..  Sommer Thacker

## 2017-06-29 NOTE — PROGRESS NOTES
[facility]  General Surgery    Subjective:      HPI:  Pt is a very pleasant 60 yo male with a PMHx of Htn, DM, DVT LLE, GERD, colon polyps and vitamin D insufficiency. He is referred by Dr. Fabrizio Justin for evaluation and management of a soft tissue mass of the right upper arm. He states that he has had the mass for 10 - 15 yrs. The mass started to increase in size about 3 yrs ago. It was the size of a walnut. It itches. Patient Active Problem List    Diagnosis Date Noted    Mass of right upper extremity 01/08/2017    GERD (gastroesophageal reflux disease) 01/08/2017    Vitamin D insufficiency 01/08/2017    Diabetes mellitus ([address] 01/30/2014    Essential hypertension 01/30/2014    Deep vein thrombosis (DVT); left popliteal, gastrocnemius, soleus veins 9/2012 09/05/2012    Family history of prostate cancer 07/03/2012    Colon polyps 07/03/2012    Gastric ulcer 07/03/2012    Hyperlipidemia 01/03/2012     Past Medical History:   Diagnosis Date    Colon polyps     Diabetes mellitus ([address]     FH: prostate cancer     GERD (gastroesophageal reflux disease)     History of DVT (deep vein thrombosis) 09/2012    Provoked following airplane ride home from [place]. Venous duplex scan showed acute nonocclusive DVT in left popliteal vein; acute thrombosis left gastocnemius and soleus vein.  Hypercholesterolemia     Hypertension     Thromboembolus ([address]     leg  years ago      Past Surgical History:   Procedure Laterality Date    HX GI      colonoscopy x 3      Family History   Problem Relation Age of Onset    Diabetes Mother     Cancer Father     Stroke Father     Hypertension Father     Diabetes Brother       Social History   Substance Use Topics    Smoking status: Former Smoker     Quit date: 1/1/1999    Smokeless tobacco: Never Used    Alcohol use No      No Known Allergies    Prior to Admission medications    Medication Sig Start Date End Date Taking?  Authorizing Provider simvastatin (ZOCOR) 10 mg tablet Take 1 Tab by mouth nightly. 5/31/17  Yes Reynaldo Lindsey MD   cholecalciferol (VITAMIN D3) 1,000 unit cap Take 1,000 Units by mouth daily. Yes Historical Provider       Review of Systems:    14 systems were reviewed. The results are as above in the HPI and otherwise negative. Objective:     Vitals:    06/14/17 1327   BP: 118/68   Pulse: 82   Resp: 18   Temp: 98.1 °F (36.7 °C)   TempSrc: Oral   Weight: 93.4 kg (206 lb)   Height: 6' 2\" (1.88 m)       Physical Exam:  GENERAL: alert, cooperative, no distress, appears stated age,   EYE: conjunctivae/corneas clear. PERRL, EOM's intact. THROAT & NECK: normal and no erythema or exudates noted. ,    LYMPHATIC: Cervical, supraclavicular, and axillary nodes normal. ,   LUNG: clear to auscultation bilaterally,   HEART: regular rate and rhythm, S1, S2 normal, no murmur, click, rub or gallop,   ABDOMEN: soft, non-tender. Bowel sounds normal. No masses,  no organomegaly,   EXTREMITIES:  extremities normal, atraumatic, no cyanosis or edema,   SKIN: 5 x 2 cm soft tissue mass of the right upper arm   NEUROLOGIC: AOx3. Cranial nerves 2-12 and sensation grossly intact. ,     Data Review:  to be done    Mr. Benton Wolf has a reminder for a \"due or due soon\" health maintenance. I have asked that he contact his primary care provider for follow-up on this health maintenance. Impression:     · Pt with a soft tissue mass of uncertain etiology of the RUE in need of excisional biopsy. The risk of cardiovascular complications such a MI and stroke are increased because of hypertension and DM.       Plan:     · Excisional biopsy of the soft tissue mass of the right upper arm  · Consent on chart  · Preoperative orders written    Signed By: Juan Carlos Gerard MD     June 29, 2017

## 2017-06-30 ENCOUNTER — ANESTHESIA (OUTPATIENT)
Dept: SURGERY | Age: 67
End: 2017-06-30
Payer: COMMERCIAL

## 2017-06-30 ENCOUNTER — HOSPITAL ENCOUNTER (OUTPATIENT)
Age: 67
Setting detail: OUTPATIENT SURGERY
Discharge: HOME | End: 2017-06-30
Attending: SURGERY | Admitting: SURGERY
Payer: COMMERCIAL

## 2017-06-30 VITALS
DIASTOLIC BLOOD PRESSURE: 89 MMHG | SYSTOLIC BLOOD PRESSURE: 158 MMHG | OXYGEN SATURATION: 97 % | TEMPERATURE: 96.8 F | HEIGHT: 74 IN | BODY MASS INDEX: 26.95 KG/M2 | WEIGHT: 210 LBS | RESPIRATION RATE: 16 BRPM | HEART RATE: 61 BPM

## 2017-06-30 LAB
GLUCOSE BLD STRIP.AUTO-MCNC: 104 MG/DL (ref 70–110)
GLUCOSE BLD STRIP.AUTO-MCNC: 126 MG/DL (ref 70–110)

## 2017-06-30 PROCEDURE — 88304 TISSUE EXAM BY PATHOLOGIST: CPT | Performed by: SURGERY

## 2017-06-30 PROCEDURE — 74011250636 HC RX REV CODE- 250/636

## 2017-06-30 PROCEDURE — 76060000033 HC ANESTHESIA 1 TO 1.5 HR: Performed by: SURGERY

## 2017-06-30 PROCEDURE — 76210000063 HC OR PH I REC FIRST 0.5 HR: Performed by: SURGERY

## 2017-06-30 PROCEDURE — 76010000149 HC OR TIME 1 TO 1.5 HR: Performed by: SURGERY

## 2017-06-30 PROCEDURE — 77030032490 HC SLV COMPR SCD KNE COVD -B: Performed by: SURGERY

## 2017-06-30 PROCEDURE — 74011250636 HC RX REV CODE- 250/636: Performed by: NURSE ANESTHETIST, CERTIFIED REGISTERED

## 2017-06-30 PROCEDURE — 77030010866

## 2017-06-30 PROCEDURE — 76210000021 HC REC RM PH II 0.5 TO 1 HR: Performed by: SURGERY

## 2017-06-30 PROCEDURE — 74011000250 HC RX REV CODE- 250

## 2017-06-30 PROCEDURE — 77030003028 HC SUT VCRL J&J -A: Performed by: SURGERY

## 2017-06-30 PROCEDURE — 74011000250 HC RX REV CODE- 250: Performed by: SURGERY

## 2017-06-30 PROCEDURE — 77030011640 HC PAD GRND REM COVD -A: Performed by: SURGERY

## 2017-06-30 PROCEDURE — 82962 GLUCOSE BLOOD TEST: CPT

## 2017-06-30 PROCEDURE — 77030031139 HC SUT VCRL2 J&J -A: Performed by: SURGERY

## 2017-06-30 PROCEDURE — 74011250637 HC RX REV CODE- 250/637: Performed by: NURSE ANESTHETIST, CERTIFIED REGISTERED

## 2017-06-30 PROCEDURE — 77030011270 HC ELECTRD CAUT TIP MEGA -A

## 2017-06-30 PROCEDURE — 88305 TISSUE EXAM BY PATHOLOGIST: CPT | Performed by: SURGERY

## 2017-06-30 PROCEDURE — 77030020782 HC GWN BAIR PAWS FLX 3M -B: Performed by: SURGERY

## 2017-06-30 PROCEDURE — 74011250636 HC RX REV CODE- 250/636: Performed by: SURGERY

## 2017-06-30 RX ORDER — INSULIN LISPRO 100 [IU]/ML
INJECTION, SOLUTION INTRAVENOUS; SUBCUTANEOUS ONCE
Status: DISCONTINUED | OUTPATIENT
Start: 2017-06-30 | End: 2017-06-30 | Stop reason: HOSPADM

## 2017-06-30 RX ORDER — LIDOCAINE HYDROCHLORIDE 10 MG/ML
INJECTION, SOLUTION EPIDURAL; INFILTRATION; INTRACAUDAL; PERINEURAL AS NEEDED
Status: DISCONTINUED | OUTPATIENT
Start: 2017-06-30 | End: 2017-06-30 | Stop reason: HOSPADM

## 2017-06-30 RX ORDER — GLYCOPYRROLATE 0.2 MG/ML
INJECTION INTRAMUSCULAR; INTRAVENOUS AS NEEDED
Status: DISCONTINUED | OUTPATIENT
Start: 2017-06-30 | End: 2017-06-30 | Stop reason: HOSPADM

## 2017-06-30 RX ORDER — ONDANSETRON 2 MG/ML
4 INJECTION INTRAMUSCULAR; INTRAVENOUS ONCE
Status: DISCONTINUED | OUTPATIENT
Start: 2017-06-30 | End: 2017-06-30 | Stop reason: HOSPADM

## 2017-06-30 RX ORDER — ONDANSETRON 2 MG/ML
INJECTION INTRAMUSCULAR; INTRAVENOUS AS NEEDED
Status: DISCONTINUED | OUTPATIENT
Start: 2017-06-30 | End: 2017-06-30 | Stop reason: HOSPADM

## 2017-06-30 RX ORDER — OXYCODONE AND ACETAMINOPHEN 5; 325 MG/1; MG/1
1-2 TABLET ORAL
Qty: 40 TAB | Refills: 0 | Status: SHIPPED | OUTPATIENT
Start: 2017-06-30 | End: 2017-07-16 | Stop reason: ALTCHOICE

## 2017-06-30 RX ORDER — ALBUTEROL SULFATE 0.83 MG/ML
2.5 SOLUTION RESPIRATORY (INHALATION) AS NEEDED
Status: DISCONTINUED | OUTPATIENT
Start: 2017-06-30 | End: 2017-06-30 | Stop reason: HOSPADM

## 2017-06-30 RX ORDER — HYDROMORPHONE HYDROCHLORIDE 2 MG/ML
0.5 INJECTION, SOLUTION INTRAMUSCULAR; INTRAVENOUS; SUBCUTANEOUS
Status: DISCONTINUED | OUTPATIENT
Start: 2017-06-30 | End: 2017-06-30 | Stop reason: HOSPADM

## 2017-06-30 RX ORDER — DIPHENHYDRAMINE HYDROCHLORIDE 50 MG/ML
12.5 INJECTION, SOLUTION INTRAMUSCULAR; INTRAVENOUS
Status: DISCONTINUED | OUTPATIENT
Start: 2017-06-30 | End: 2017-06-30 | Stop reason: HOSPADM

## 2017-06-30 RX ORDER — MIDAZOLAM HYDROCHLORIDE 1 MG/ML
INJECTION, SOLUTION INTRAMUSCULAR; INTRAVENOUS AS NEEDED
Status: DISCONTINUED | OUTPATIENT
Start: 2017-06-30 | End: 2017-06-30 | Stop reason: HOSPADM

## 2017-06-30 RX ORDER — LIDOCAINE HYDROCHLORIDE 20 MG/ML
INJECTION, SOLUTION EPIDURAL; INFILTRATION; INTRACAUDAL; PERINEURAL AS NEEDED
Status: DISCONTINUED | OUTPATIENT
Start: 2017-06-30 | End: 2017-06-30 | Stop reason: HOSPADM

## 2017-06-30 RX ORDER — NALOXONE HYDROCHLORIDE 0.4 MG/ML
0.4 INJECTION, SOLUTION INTRAMUSCULAR; INTRAVENOUS; SUBCUTANEOUS AS NEEDED
Status: DISCONTINUED | OUTPATIENT
Start: 2017-06-30 | End: 2017-06-30 | Stop reason: HOSPADM

## 2017-06-30 RX ORDER — SODIUM CHLORIDE 0.9 % (FLUSH) 0.9 %
5-10 SYRINGE (ML) INJECTION AS NEEDED
Status: DISCONTINUED | OUTPATIENT
Start: 2017-06-30 | End: 2017-06-30 | Stop reason: HOSPADM

## 2017-06-30 RX ORDER — FAMOTIDINE 20 MG/1
20 TABLET, FILM COATED ORAL ONCE
Status: COMPLETED | OUTPATIENT
Start: 2017-06-30 | End: 2017-06-30

## 2017-06-30 RX ORDER — SODIUM CHLORIDE, SODIUM LACTATE, POTASSIUM CHLORIDE, CALCIUM CHLORIDE 600; 310; 30; 20 MG/100ML; MG/100ML; MG/100ML; MG/100ML
50 INJECTION, SOLUTION INTRAVENOUS CONTINUOUS
Status: DISCONTINUED | OUTPATIENT
Start: 2017-06-30 | End: 2017-06-30 | Stop reason: HOSPADM

## 2017-06-30 RX ORDER — MAGNESIUM SULFATE 100 %
4 CRYSTALS MISCELLANEOUS AS NEEDED
Status: DISCONTINUED | OUTPATIENT
Start: 2017-06-30 | End: 2017-06-30 | Stop reason: HOSPADM

## 2017-06-30 RX ORDER — DEXTROSE 50 % IN WATER (D50W) INTRAVENOUS SYRINGE
25-50 AS NEEDED
Status: DISCONTINUED | OUTPATIENT
Start: 2017-06-30 | End: 2017-06-30 | Stop reason: HOSPADM

## 2017-06-30 RX ORDER — DOCUSATE SODIUM 100 MG/1
100 CAPSULE, LIQUID FILLED ORAL 2 TIMES DAILY
Qty: 60 CAP | Refills: 0 | Status: SHIPPED | OUTPATIENT
Start: 2017-06-30 | End: 2017-07-30

## 2017-06-30 RX ORDER — SODIUM CHLORIDE, SODIUM LACTATE, POTASSIUM CHLORIDE, CALCIUM CHLORIDE 600; 310; 30; 20 MG/100ML; MG/100ML; MG/100ML; MG/100ML
75 INJECTION, SOLUTION INTRAVENOUS CONTINUOUS
Status: DISCONTINUED | OUTPATIENT
Start: 2017-06-30 | End: 2017-06-30 | Stop reason: HOSPADM

## 2017-06-30 RX ORDER — CEFAZOLIN SODIUM 2 G/50ML
2 SOLUTION INTRAVENOUS ONCE
Status: COMPLETED | OUTPATIENT
Start: 2017-06-30 | End: 2017-06-30

## 2017-06-30 RX ORDER — BUPIVACAINE HYDROCHLORIDE AND EPINEPHRINE 2.5; 5 MG/ML; UG/ML
INJECTION, SOLUTION EPIDURAL; INFILTRATION; INTRACAUDAL; PERINEURAL AS NEEDED
Status: DISCONTINUED | OUTPATIENT
Start: 2017-06-30 | End: 2017-06-30 | Stop reason: HOSPADM

## 2017-06-30 RX ORDER — DOCUSATE SODIUM 100 MG/1
100 CAPSULE, LIQUID FILLED ORAL 2 TIMES DAILY
Qty: 60 CAP | Refills: 0 | Status: SHIPPED | OUTPATIENT
Start: 2017-06-30 | End: 2017-07-12 | Stop reason: SDUPTHER

## 2017-06-30 RX ORDER — SODIUM CHLORIDE 0.9 % (FLUSH) 0.9 %
5-10 SYRINGE (ML) INJECTION EVERY 8 HOURS
Status: DISCONTINUED | OUTPATIENT
Start: 2017-06-30 | End: 2017-06-30 | Stop reason: HOSPADM

## 2017-06-30 RX ORDER — PROPOFOL 10 MG/ML
INJECTION, EMULSION INTRAVENOUS AS NEEDED
Status: DISCONTINUED | OUTPATIENT
Start: 2017-06-30 | End: 2017-06-30 | Stop reason: HOSPADM

## 2017-06-30 RX ORDER — FENTANYL CITRATE 50 UG/ML
INJECTION, SOLUTION INTRAMUSCULAR; INTRAVENOUS AS NEEDED
Status: DISCONTINUED | OUTPATIENT
Start: 2017-06-30 | End: 2017-06-30 | Stop reason: HOSPADM

## 2017-06-30 RX ADMIN — FENTANYL CITRATE 100 MCG: 50 INJECTION, SOLUTION INTRAMUSCULAR; INTRAVENOUS at 07:45

## 2017-06-30 RX ADMIN — PROPOFOL 50 MG: 10 INJECTION, EMULSION INTRAVENOUS at 08:02

## 2017-06-30 RX ADMIN — FAMOTIDINE 20 MG: 20 TABLET, FILM COATED ORAL at 06:51

## 2017-06-30 RX ADMIN — ONDANSETRON 4 MG: 2 INJECTION INTRAMUSCULAR; INTRAVENOUS at 07:38

## 2017-06-30 RX ADMIN — SODIUM CHLORIDE, SODIUM LACTATE, POTASSIUM CHLORIDE, AND CALCIUM CHLORIDE: 600; 310; 30; 20 INJECTION, SOLUTION INTRAVENOUS at 07:38

## 2017-06-30 RX ADMIN — PROPOFOL 50 MG: 10 INJECTION, EMULSION INTRAVENOUS at 08:15

## 2017-06-30 RX ADMIN — PROPOFOL 50 MG: 10 INJECTION, EMULSION INTRAVENOUS at 08:46

## 2017-06-30 RX ADMIN — PROPOFOL 50 MG: 10 INJECTION, EMULSION INTRAVENOUS at 07:45

## 2017-06-30 RX ADMIN — MIDAZOLAM HYDROCHLORIDE 2 MG: 1 INJECTION, SOLUTION INTRAMUSCULAR; INTRAVENOUS at 07:38

## 2017-06-30 RX ADMIN — GLYCOPYRROLATE 0.2 MG: 0.2 INJECTION INTRAMUSCULAR; INTRAVENOUS at 07:38

## 2017-06-30 RX ADMIN — CEFAZOLIN SODIUM 2 G: 2 SOLUTION INTRAVENOUS at 07:38

## 2017-06-30 RX ADMIN — PROPOFOL 50 MG: 10 INJECTION, EMULSION INTRAVENOUS at 07:52

## 2017-06-30 RX ADMIN — LIDOCAINE HYDROCHLORIDE 60 MG: 20 INJECTION, SOLUTION EPIDURAL; INFILTRATION; INTRACAUDAL; PERINEURAL at 07:45

## 2017-06-30 RX ADMIN — PROPOFOL 50 MG: 10 INJECTION, EMULSION INTRAVENOUS at 08:30

## 2017-06-30 RX ADMIN — SODIUM CHLORIDE, SODIUM LACTATE, POTASSIUM CHLORIDE, AND CALCIUM CHLORIDE 75 ML/HR: 600; 310; 30; 20 INJECTION, SOLUTION INTRAVENOUS at 06:39

## 2017-06-30 NOTE — IP AVS SNAPSHOT
Kenyon Cassandra 
 
 
 [address] [address] 
[phone] Patient: Michele Villatoro MRN: [id number] Deberah Stabs You are allergic to the following No active allergies Recent Documentation Height Weight BMI Smoking Status 1.88 m 95.3 kg 26.96 kg/m2 Former Smoker Emergency Contacts Name Discharge Info Relation Home Work Mobile Kolb,Tory DISCHARGE CAREGIVER [3] Spouse [3] [phone] About your hospitalization You were admitted on:  June 30, 2017 You last received care in the:  [facility] PACU You were discharged on:  June 30, 2017 Unit phone number:  [phone] Why you were hospitalized Your primary diagnosis was:  Not on File Providers Seen During Your Hospitalizations Provider Role Specialty Primary office phone Herb Pabon MD Attending Provider General Surgery [phone] Your Primary Care Physician (PCP) Primary Care Physician Office Phone Office Fax [facility] [phone] Follow-up Information Follow up With Details Comments Contact Info Tra Oliveira MD   [address] [facility] [address] [facility] [address] 
[phone] Herb Pabon MD Follow up in 2 week(s)  [address] [facility] [address] 
[phone] Your Appointments Wednesday July 05, 2017  8:25 AM EDT  
LAB with [facility] NURSE VISIT Internist of [facility] ([address] [place] [address] Road) [address] [facility] [address] [facility] [address]  
[phone] Tuesday July 11, 2017 11:00 AM EDT  
POST OP with MARIBELL Balderrama MEM [facility] ([address] [place] [address]) [address] [facility] [address]  
[phone] Wednesday July 12, 2017  8:30 AM EDT Office Visit with Tra Oliveira MD  
Internist of [facility] Dry [address] [place] [address]) [address] [facility], [address] [facility] 200 [facility]  
[phone] Current Discharge Medication List  
  
START taking these medications Dose & Instructions Dispensing Information Comments Morning Noon Evening Bedtime  
 docusate sodium 100 mg capsule Commonly known as:  Ish Hadley Your last dose was: Your next dose is:    
   
   
 Dose:  100 mg Take 1 Cap by mouth two (2) times a day for 30 days. Quantity:  60 Cap Refills:  0  
     
   
   
   
  
 oxyCODONE-acetaminophen 5-325 mg per tablet Commonly known as:  PERCOCET Your last dose was: Your next dose is:    
   
   
 Dose:  1-2 Tab Take 1-2 Tabs by mouth every four (4) hours as needed for Pain. Max Daily Amount: 12 Tabs. Quantity:  40 Tab Refills:  0 CONTINUE these medications which have NOT CHANGED Dose & Instructions Dispensing Information Comments Morning Noon Evening Bedtime  
 cholecalciferol 1,000 unit Cap Commonly known as:  VITAMIN D3 Your last dose was: Your next dose is:    
   
   
 Dose:  1000 Units Take 1,000 Units by mouth daily. Refills:  0  
     
   
   
   
  
 simvastatin 10 mg tablet Commonly known as:  ZOCOR Your last dose was: Your next dose is:    
   
   
 Dose:  10 mg Take 1 Tab by mouth nightly. Quantity:  90 Tab Refills:  3 Where to Get Your Medications These medications were sent to [address] , [address] [facility]  [address] [address] Phone:  [phone]  
  docusate sodium 100 mg capsule Information on where to get these meds will be given to you by the nurse or doctor. ! Ask your nurse or doctor about these medications  
  oxyCODONE-acetaminophen 5-325 mg per tablet Discharge Instructions [facility] Neo Woodall MD, [facility] General Surgery Pt may remove the dressing and shower in two days. Allow soap and water to run over the incision. No driving or operating heavy machinery while on narcotic pain medications. No strenuous activity or contact sports for two weeks. No lifting greater than 15 lbs for 2 weeks. Call MD for any redness, swelling, bleeding or pus at the incision. Also call for any nausea, vomiting, increased pain or pain uncontrolled by pain medicine. Epidermoid Cyst: Care Instructions Your Care Instructions An epidermoid (say \"al-ogt-ARV-moyd\") cyst is a lump just under the skin. These cysts can form when a hair follicle becomes blocked. They are common in acne and may occur on the face, neck, back, and genitals. However, they can form anywhere on the body. These cysts are not cancer and do not lead to cancer. They tend not to hurt, but they can sometimes become swollen and painful. They also may break open (rupture) and cause scarring. These cysts sometimes do not cause problems and may not need treatment. If you have a cyst that is swollen and hurts, your doctor may inject it with a medicine to help it heal. But it is more likely that a painful cyst will need to be removed. Your doctor will give you a shot of numbing medicine and cut into the cyst to drain it or remove it. This makes the symptoms go away. Follow-up care is a key part of your treatment and safety. Be sure to make and go to all appointments, and call your doctor if you are having problems. Its also a good idea to know your test results and keep a list of the medicines you take. How can you care for yourself at home? · Do not squeeze the cyst or poke it with a needle to open it. This can cause swelling, redness, and infection. · Always have a doctor look at any new lumps you get to make sure that they are not serious. When should you call for help? Watch closely for changes in your health, and be sure to contact your doctor if: 
· You have a fever, redness, or swelling after you get a shot of medicine in the cyst. 
· You see or feel a new lump on your skin. Where can you learn more? Go to http://yanet-sergio.info/. Enter N165 in the search box to learn more about \"Epidermoid Cyst: Care Instructions. \" Current as of: October 13, 2016 Content Version: 11.3 © 4229-9341 [employer]. Care instructions adapted under license by [employer] (which disclaims liability or warranty for this information). If you have questions about a medical condition or this instruction, always ask your healthcare professional. [address] [address] any warranty or liability for your use of this information. Narcotic-Analgesic/Acetaminophen (Percocet, Norco, Lorcet HD, Lortab 10/325) - (By mouth) Why this medicine is used:  
Relieves pain. Contact a nurse or doctor right away if you have: 
· Extreme weakness, shallow breathing, slow heartbeat · Severe confusion, lightheadedness, dizziness, fainting · Yellow skin or eyes, dark urine or pale stools · Severe constipation, severe stomach pain, nausea, vomiting, loss of appetite · Sweating or cold, clammy skin Common side effects: · Mild constipation, nausea, vomiting · Sleepiness, tiredness · Itching, rash © 2017 [address]  Information is for End User's use only and may not be sold, redistributed or otherwise used for commercial purposes. Laxative, Stool Softeners (Doculax, Colace, Colace Clear, DSS) - (By mouth) Why this medicine is used:  
Treats constipation by helping you have a bowel movement. Contact a nurse or doctor right away if you have: · Dark urine or pale stools · Vomiting, loss of appetite, stomach pain · Yellow skin or eyes Common side effects: 
· Nausea, diarrhea, stomach cramps, bitter taste in mouth © 2017 [facility] Information is for End User's use only and may not be sold, redistributed or otherwise used for commercial purposes. DISCHARGE SUMMARY from Nurse The following personal items are in your possession at time of discharge: 
 
Dental Appliances: None Visual Aid: Glasses Home Medications: None Jewelry: None Clothing: Pants, Shirt, Footwear, Undergarments Other Valuables: None PATIENT INSTRUCTIONS: 
 
 
F-face looks uneven A-arms unable to move or move unevenly S-speech slurred or non-existent T-time-call 911 as soon as signs and symptoms begin-DO NOT go Back to bed or wait to see if you get better-TIME IS BRAIN. Warning Signs of HEART ATTACK Call 911 if you have these symptoms: 
? Chest discomfort. Most heart attacks involve discomfort in the center of the chest that lasts more than a few minutes, or that goes away and comes back. It can feel like uncomfortable pressure, squeezing, fullness, or pain. ? Discomfort in other areas of the upper body. Symptoms can include pain or discomfort in one or both arms, the back, neck, jaw, or stomach. ? Shortness of breath with or without chest discomfort. ? Other signs may include breaking out in a cold sweat, nausea, or lightheadedness. Don't wait more than five minutes to call [address]! Fast action can save your life. Calling 911 is almost always the fastest way to get lifesaving treatment. Emergency Medical Services staff can begin treatment when they arrive  up to an hour sooner than if someone gets to the hospital by car. The discharge information has been reviewed with the patient and spouse. The patient and spouse verbalized understanding. Discharge medications reviewed with the patient and spouse and appropriate educational materials and side effects teaching were provided. Discharge Orders None [facility] Announcement We are excited to announce that we are making your provider's discharge notes available to you in Skoovy. You will see these notes when they are completed and signed by the physician that discharged you from your recent hospital stay. If you have any questions or concerns about any information you see in Skoovy, please call the Health Information Department where you were seen or reach out to your Primary Care Provider for more information about your plan of care. Introducing [facility]! Dear Holden Oliveira: Thank you for requesting a Skoovy account. Our records indicate that you already have an active Skoovy account. You can access your account anytime at [email or web address] Did you know that you can access your hospital and ER discharge instructions at any time in Skoovy? You can also review all of your test results from your hospital stay or ER visit. Additional Information If you have questions, please visit the Frequently Asked Questions section of the Skoovy website at [email or web address] Remember, Skoovy is NOT to be used for urgent needs. For medical emergencies, dial 911. Now available from your iPhone and Android! General Information Please provide this summary of care documentation to your next provider. Patient Signature:  ____________________________________________________________ Date:  ____________________________________________________________  
  
Augustine Eugene Provider Signature:  ____________________________________________________________ Date:  ____________________________________________________________

## 2017-06-30 NOTE — H&P (VIEW-ONLY)
[facility]  General Surgery    Subjective:      HPI:  Pt is a very pleasant 62 yo male with a PMHx of Htn, DM, DVT LLE, GERD, colon polyps and vitamin D insufficiency. He is referred by Dr. Jamilah Yousif for evaluation and management of a soft tissue mass of the right upper arm. He states that he has had the mass for 10 - 15 yrs. The mass started to increase in size about 3 yrs ago. It was the size of a walnut. It itches. Patient Active Problem List    Diagnosis Date Noted    Mass of right upper extremity 01/08/2017    GERD (gastroesophageal reflux disease) 01/08/2017    Vitamin D insufficiency 01/08/2017    Diabetes mellitus ([address] 01/30/2014    Essential hypertension 01/30/2014    Deep vein thrombosis (DVT); left popliteal, gastrocnemius, soleus veins 9/2012 09/05/2012    Family history of prostate cancer 07/03/2012    Colon polyps 07/03/2012    Gastric ulcer 07/03/2012    Hyperlipidemia 01/03/2012     Past Medical History:   Diagnosis Date    Colon polyps     Diabetes mellitus ([address]     FH: prostate cancer     GERD (gastroesophageal reflux disease)     History of DVT (deep vein thrombosis) 09/2012    Provoked following airplane ride home from [place]. Venous duplex scan showed acute nonocclusive DVT in left popliteal vein; acute thrombosis left gastocnemius and soleus vein.  Hypercholesterolemia     Hypertension     Thromboembolus ([address]     leg  years ago      Past Surgical History:   Procedure Laterality Date    HX GI      colonoscopy x 3      Family History   Problem Relation Age of Onset    Diabetes Mother     Cancer Father     Stroke Father     Hypertension Father     Diabetes Brother       Social History   Substance Use Topics    Smoking status: Former Smoker     Quit date: 1/1/1999    Smokeless tobacco: Never Used    Alcohol use No      No Known Allergies    Prior to Admission medications    Medication Sig Start Date End Date Taking?  Authorizing Provider simvastatin (ZOCOR) 10 mg tablet Take 1 Tab by mouth nightly. 5/31/17  Yes Marimar Uribe MD   cholecalciferol (VITAMIN D3) 1,000 unit cap Take 1,000 Units by mouth daily. Yes Historical Provider       Review of Systems:    14 systems were reviewed. The results are as above in the HPI and otherwise negative. Objective:     Vitals:    06/14/17 1327   BP: 118/68   Pulse: 82   Resp: 18   Temp: 98.1 °F (36.7 °C)   TempSrc: Oral   Weight: 93.4 kg (206 lb)   Height: 6' 2\" (1.88 m)       Physical Exam:  GENERAL: alert, cooperative, no distress, appears stated age,   EYE: conjunctivae/corneas clear. PERRL, EOM's intact. THROAT & NECK: normal and no erythema or exudates noted. ,    LYMPHATIC: Cervical, supraclavicular, and axillary nodes normal. ,   LUNG: clear to auscultation bilaterally,   HEART: regular rate and rhythm, S1, S2 normal, no murmur, click, rub or gallop,   ABDOMEN: soft, non-tender. Bowel sounds normal. No masses,  no organomegaly,   EXTREMITIES:  extremities normal, atraumatic, no cyanosis or edema,   SKIN: 5 x 2 cm soft tissue mass of the right upper arm   NEUROLOGIC: AOx3. Cranial nerves 2-12 and sensation grossly intact. ,     Data Review:  to be done    Mr. Nadiya Nunez has a reminder for a \"due or due soon\" health maintenance. I have asked that he contact his primary care provider for follow-up on this health maintenance. Impression:     · Pt with a soft tissue mass of uncertain etiology of the RUE in need of excisional biopsy. The risk of cardiovascular complications such a MI and stroke are increased because of hypertension and DM.       Plan:     · Excisional biopsy of the soft tissue mass of the right upper arm  · Consent on chart  · Preoperative orders written    Signed By: Mis Fuentes MD     June 29, 2017

## 2017-06-30 NOTE — DISCHARGE SUMMARY
[facility]  Denny Huerta MD, [facility]  General Surgery  Discharge Summary     Patient ID:  Patrick Paz  [id number]  61 y.o.  1950    Admit Date: 6/30/2017    Discharge Date: 6/30/2017    Admission Diagnoses: Soft tissue mass [M79.9]    Discharge Diagnoses:    Problem List as of 6/30/2017  Date Reviewed: 6/30/2017          Codes Class Noted - Resolved    Mass of right upper extremity ICD-10-CM: R22.31  ICD-9-CM: 782.2  1/8/2017 - Present        GERD (gastroesophageal reflux disease) ICD-10-CM: K21.9  ICD-9-CM: 530.81  1/8/2017 - Present        Vitamin D insufficiency ICD-10-CM: E55.9  ICD-9-CM: 268.9  1/8/2017 - Present        Diabetes mellitus ([address] ICD-10-CM: E11.9  ICD-9-CM: 250.00  1/30/2014 - Present        Essential hypertension ICD-10-CM: I10  ICD-9-CM: 401.9  1/30/2014 - Present        Deep vein thrombosis (DVT); left popliteal, gastrocnemius, soleus veins 9/2012 ICD-10-CM: I82.432  ICD-9-CM: 453.41  9/5/2012 - Present        Family history of prostate cancer ICD-10-CM: Z80.42  ICD-9-CM: V16.42  7/3/2012 - Present        Colon polyps ICD-10-CM: K63.5  ICD-9-CM: 211.3  7/3/2012 - Present        Gastric ulcer ICD-10-CM: K25.9  ICD-9-CM: 531.90  7/3/2012 - Present        Hyperlipidemia ICD-10-CM: E78.5  ICD-9-CM: 272.4  1/3/2012 - Present        RESOLVED: Low TSH level ICD-10-CM: R94.6  ICD-9-CM: 794.5  2/17/2016 - 1/8/2017        RESOLVED: Abnormal glucose ICD-10-CM: R73.09  ICD-9-CM: 790.29  7/30/2013 - 1/30/2014               Admission Condition: Good    Discharge Condition: Good    Last Procedure: Procedure(s):  EXCISIONAL BIOPSY RIGHT UPPER ARM POSTERIOR SURFACE MASS    Hospital Course:   Normal hospital course for this procedure.     Consults: None    Significant Diagnostic Studies: None    Disposition: home    Patient Instructions:   Current Discharge Medication List      START taking these medications    Details   oxyCODONE-acetaminophen (PERCOCET) 5-325 mg per tablet Take 1-2 Tabs by mouth every four (4) hours as needed for Pain. Max Daily Amount: 12 Tabs. Qty: 40 Tab, Refills: 0      docusate sodium (COLACE) 100 mg capsule Take 1 Cap by mouth two (2) times a day for 30 days. Qty: 60 Cap, Refills: 0         CONTINUE these medications which have NOT CHANGED    Details   simvastatin (ZOCOR) 10 mg tablet Take 1 Tab by mouth nightly. Qty: 90 Tab, Refills: 3      cholecalciferol (VITAMIN D3) 1,000 unit cap Take 1,000 Units by mouth daily. Activity: See surgical instructions  Diet: Low fat, Low cholesterol  Wound Care: As directed    Follow-up with Dr. Kanu Dalton in 2 weeks.   Follow-up tests/labs None    Signed:  Chai Smith MD  6/30/2017  8:35 AM

## 2017-06-30 NOTE — ANESTHESIA PREPROCEDURE EVALUATION
Anesthetic History   No history of anesthetic complications            Review of Systems / Medical History  Patient summary reviewed and pertinent labs reviewed    Pulmonary  Within defined limits                 Neuro/Psych   Within defined limits           Cardiovascular  Within defined limits  Hypertension              Exercise tolerance: >4 METS     GI/Hepatic/Renal     GERD      PUD     Endo/Other    Diabetes: type 2         Other Findings   Comments:   Risk Factors for Postoperative nausea/vomiting:       History of postoperative nausea/vomiting? NO       Female? NO       Motion sickness? NO       Intended opioid administration for postoperative analgesia? YES      Smoking Abstinence  Current Smoker? NO  Elective Surgery? YES  Seen preoperatively by anesthesiologist or proxy prior to day of surgery? YES  Pt abstained from smoking 24 hours prior to anesthesia?  YES               Physical Exam    Airway  Mallampati: II  TM Distance: 4 - 6 cm  Neck ROM: normal range of motion   Mouth opening: Normal     Cardiovascular    Rhythm: regular  Rate: normal         Dental    Dentition: Poor dentition     Pulmonary  Breath sounds clear to auscultation               Abdominal  GI exam deferred       Other Findings            Anesthetic Plan    ASA: 3  Anesthesia type: general          Induction: Intravenous  Anesthetic plan and risks discussed with: Patient

## 2017-06-30 NOTE — ANESTHESIA POSTPROCEDURE EVALUATION
Post-Anesthesia Evaluation and Assessment    Patient: Domenica Davis MRN: [id number]  SSN: [id number]    YOB: 1950  Age: 61 y.o. Sex: male       Cardiovascular Function/Vital Signs  Visit Vitals    /87    Pulse 66    Temp 36.3 °C (97.4 °F)    Resp 16    Ht 6' 2\" (1.88 m)    Wt 95.3 kg (210 lb)    SpO2 99%    BMI 26.96 kg/m2       Patient is status post general anesthesia for Procedure(s):  EXCISIONAL BIOPSY RIGHT UPPER ARM POSTERIOR SURFACE MASS. Nausea/Vomiting: None    Postoperative hydration reviewed and adequate. Pain:  Pain Scale 1: Numeric (0 - 10) (06/30/17 0915)  Pain Intensity 1: 0 (06/30/17 0915)   Managed    Neurological Status:   Neuro (WDL): Within Defined Limits (06/30/17 0850)   At baseline    Mental Status and Level of Consciousness: Arousable    Pulmonary Status:   O2 Device: Room air (06/30/17 0858)   Adequate oxygenation and airway patent    Complications related to anesthesia: None    Post-anesthesia assessment completed.  No concerns    Signed By: Julius Hamilton MD     June 30, 2017

## 2017-06-30 NOTE — DISCHARGE INSTRUCTIONS
[place] [address] [facility]  Cassy Scruggs MD, FACS  General Surgery    Pt may remove the dressing and shower in two days. Allow soap and water to run over the incision. No driving or operating heavy machinery while on narcotic pain medications. No strenuous activity or contact sports for two weeks. No lifting greater than 15 lbs for 2 weeks. Call MD for any redness, swelling, bleeding or pus at the incision. Also call for any nausea, vomiting, increased pain or pain uncontrolled by pain medicine. Epidermoid Cyst: Care Instructions  Your Care Instructions  An epidermoid (say \"tf-xrz-TAX-rakesh\") cyst is a lump just under the skin. These cysts can form when a hair follicle becomes blocked. They are common in acne and may occur on the face, neck, back, and genitals. However, they can form anywhere on the body. These cysts are not cancer and do not lead to cancer. They tend not to hurt, but they can sometimes become swollen and painful. They also may break open (rupture) and cause scarring. These cysts sometimes do not cause problems and may not need treatment. If you have a cyst that is swollen and hurts, your doctor may inject it with a medicine to help it heal. But it is more likely that a painful cyst will need to be removed. Your doctor will give you a shot of numbing medicine and cut into the cyst to drain it or remove it. This makes the symptoms go away. Follow-up care is a key part of your treatment and safety. Be sure to make and go to all appointments, and call your doctor if you are having problems. Its also a good idea to know your test results and keep a list of the medicines you take. How can you care for yourself at home? · Do not squeeze the cyst or poke it with a needle to open it. This can cause swelling, redness, and infection. · Always have a doctor look at any new lumps you get to make sure that they are not serious. When should you call for help?   Watch closely for changes in your health, and be sure to contact your doctor if:  · You have a fever, redness, or swelling after you get a shot of medicine in the cyst.  · You see or feel a new lump on your skin. Where can you learn more? Go to http://yanet-sergio.info/. Enter W811 in the search box to learn more about \"Epidermoid Cyst: Care Instructions. \"  Current as of: October 13, 2016  Content Version: 11.3  © 3722-2688 [employer]. Care instructions adapted under license by [employer] (which disclaims liability or warranty for this information). If you have questions about a medical condition or this instruction, always ask your healthcare professional. [place] [address] any warranty or liability for your use of this information. Narcotic-Analgesic/Acetaminophen (Percocet, Norco, Lorcet HD, Lortab 10/325) - (By mouth)   Why this medicine is used:   Relieves pain. Contact a nurse or doctor right away if you have:  · Extreme weakness, shallow breathing, slow heartbeat  · Severe confusion, lightheadedness, dizziness, fainting  · Yellow skin or eyes, dark urine or pale stools  · Severe constipation, severe stomach pain, nausea, vomiting, loss of appetite  · Sweating or cold, clammy skin     Common side effects:  · Mild constipation, nausea, vomiting  · Sleepiness, tiredness  · Itching, rash  © 2017 [address] Information is for End User's use only and may not be sold, redistributed or otherwise used for commercial purposes. Laxative, Stool Softeners (Doculax, Colace, Colace Clear, DSS) - (By mouth)   Why this medicine is used:   Treats constipation by helping you have a bowel movement.   Contact a nurse or doctor right away if you have:  · Dark urine or pale stools  · Vomiting, loss of appetite, stomach pain  · Yellow skin or eyes     Common side effects:  · Nausea, diarrhea, stomach cramps, bitter taste in mouth  © 2017 [facility] Information is for End User's use only and may not be sold, redistributed or otherwise used for commercial purposes. DISCHARGE SUMMARY from Nurse    The following personal items are in your possession at time of discharge:    Dental Appliances: None  Visual Aid: Glasses     Home Medications: None  Jewelry: None  Clothing: Pants, Shirt, Footwear, Undergarments  Other Valuables: None   PATIENT INSTRUCTIONS:    After general anesthesia or intravenous sedation, for 24 hours or while taking prescription Narcotics:  · Limit your activities  · Do not drive and operate hazardous machinery  · Do not make important personal or business decisions  · Do  not drink alcoholic beverages  · If you have not urinated within 8 hours after discharge, please contact your surgeon on call. Report the following to your surgeon:  · Excessive pain, swelling, redness or odor of or around the surgical area  · Temperature over 100.5  · Nausea and vomiting lasting longer than 4 hours or if unable to take medications  · Any signs of decreased circulation or nerve impairment to extremity: change in color, persistent  numbness, tingling, coldness or increase pain  · Any questions      *  Please give a list of your current medications to your Primary Care Provider. *  Please update this list whenever your medications are discontinued, doses are      changed, or new medications (including over-the-counter products) are added. *  Please carry medication information at all times in case of emergency situations. These are general instructions for a healthy lifestyle:    No smoking/ No tobacco products/ Avoid exposure to second hand smoke    Surgeon General's Warning:  Quitting smoking now greatly reduces serious risk to your health.     Obesity, smoking, and sedentary lifestyle greatly increases your risk for illness    A healthy diet, regular physical exercise & weight monitoring are important for maintaining a healthy lifestyle    You may be retaining fluid if you have a history of heart failure or if you experience any of the following symptoms:  Weight gain of 3 pounds or more overnight or 5 pounds in a week, increased swelling in our hands or feet or shortness of breath while lying flat in bed. Please call your doctor as soon as you notice any of these symptoms; do not wait until your next office visit. Recognize signs and symptoms of STROKE:    F-face looks uneven    A-arms unable to move or move unevenly    S-speech slurred or non-existent    T-time-call 911 as soon as signs and symptoms begin-DO NOT go       Back to bed or wait to see if you get better-TIME IS BRAIN. Warning Signs of HEART ATTACK     Call 911 if you have these symptoms:   Chest discomfort. Most heart attacks involve discomfort in the center of the chest that lasts more than a few minutes, or that goes away and comes back. It can feel like uncomfortable pressure, squeezing, fullness, or pain.  Discomfort in other areas of the upper body. Symptoms can include pain or discomfort in one or both arms, the back, neck, jaw, or stomach.  Shortness of breath with or without chest discomfort.  Other signs may include breaking out in a cold sweat, nausea, or lightheadedness. Don't wait more than five minutes to call 911 - MINUTES MATTER! Fast action can save your life. Calling 911 is almost always the fastest way to get lifesaving treatment. Emergency Medical Services staff can begin treatment when they arrive -- up to an hour sooner than if someone gets to the hospital by car. The discharge information has been reviewed with the patient and spouse. The patient and spouse verbalized understanding. Discharge medications reviewed with the patient and spouse and appropriate educational materials and side effects teaching were provided.

## 2017-06-30 NOTE — PERIOP NOTES
Patient armband removed and shredded    Patient confirmed by two identifiers with discharge instructions prior to being provided to patient and spouse.     MD notified to call prescription in to [facility] as opposed to home delivery

## 2017-06-30 NOTE — INTERVAL H&P NOTE
H&P Update:  Barbara Doshi was seen and examined. History and physical has been reviewed. The patient has been examined.  There have been no significant clinical changes since the completion of the originally dated History and Physical.    Signed By: Anh López MD     June 30, 2017 7:44 AM

## 2017-06-30 NOTE — PROGRESS NOTES
conducted a pre-surgery visit with Tahira Todd, who is a 61 y.o.,male. The  provided the following Interventions:  Initiated a relationship of care and support. Plan:  Chaplains will continue to follow and will provide pastoral care on an as needed/requested basis.  recommends bedside caregivers page  on duty if patient shows signs of acute spiritual or emotional distress.     [address] [facility] Certified [address] [place] [address]   [phone]

## 2017-06-30 NOTE — OP NOTES
[address] [place] [address] [facility], [address] [facility]                                 OPERATIVE REPORT    PATIENT:    Theresa Samaniego  MRN:           [id number]   DATE:   2017  BILLIN  ROOM:       OR/PL  ATTENDING:   Junior Sims MD  DICTATING:   Junior Sims MD      Preoperative Dx: right upper arm posterior soft tissue mass     Postoperative Dx: Same     Procedure: Excisional biopsy of right upper arm mass    Surgeon:  Lisa Newman MD    Assistant:  Aleja Clinton SA    Anesthesia:  Local - 1% lidocaine plain and .25% Marcaine with epinephrine    Findings:   Large sebaceous cyst of the right upper arm    Specimen:  Right upper arm posterior mass    EBL:5 mL    Fluid: 455 mL    Complications:  None    Brief Operative Indication:   right upper arm posterior soft tissue mass     Description of operation :  Informed consent was obtained from the patient. Time out was performed to insure correct procedure. The patient was positioned left decubitus on the table. The skin was prepped with betadine and sterile drape applied. The local anesthetic was infiltrated into the skin and subcutaneous tissues. Once the tissues were numb, the ten blade was used to create an incision 10 cm L  X  2.5 cm W to excise the 5 cm L X 2 cm W mass with a narrowest margin of .25 cm. The electrocautery was used to completely excise the entire cyst including the cyst wall. The deepest layer of dissection was the subcutaneous tissue. The wound was cleaned with sterile saline. The deep dermal tissues were re approximated using 3-0 Vicryl suture with interrupted simple stitches. The skin was re approximated using 4-0 Vicryl suture in a running subcuticular closure. Steri-strips were placed. 4x4's and tape were used to create a dressing. He tolerated the procedure well.  He was stable upon transport to the PACU.

## 2017-07-05 ENCOUNTER — HOSPITAL ENCOUNTER (OUTPATIENT)
Dept: LAB | Age: 67
Discharge: HOME | End: 2017-07-05
Payer: COMMERCIAL

## 2017-07-05 LAB
ALBUMIN SERPL BCP-MCNC: 3.8 G/DL (ref 3.4–5)
ALBUMIN/GLOB SERPL: 1.2 {RATIO} (ref 0.8–1.7)
ALP SERPL-CCNC: 62 U/L (ref 45–117)
ALT SERPL-CCNC: 55 U/L (ref 16–61)
ANION GAP BLD CALC-SCNC: 7 MMOL/L (ref 3–18)
APPEARANCE UR: CLEAR
AST SERPL W P-5'-P-CCNC: 29 U/L (ref 15–37)
BACTERIA URNS QL MICRO: NEGATIVE /HPF
BASOPHILS # BLD AUTO: 0 K/UL (ref 0–0.06)
BASOPHILS # BLD: 0 % (ref 0–2)
BILIRUB SERPL-MCNC: 0.4 MG/DL (ref 0.2–1)
BILIRUB UR QL: NEGATIVE
BUN SERPL-MCNC: 16 MG/DL (ref 7–18)
BUN/CREAT SERPL: 15 (ref 12–20)
CALCIUM SERPL-MCNC: 8.9 MG/DL (ref 8.5–10.1)
CHLORIDE SERPL-SCNC: 106 MMOL/L (ref 100–108)
CHOLEST SERPL-MCNC: 197 MG/DL
CO2 SERPL-SCNC: 27 MMOL/L (ref 21–32)
COLOR UR: YELLOW
CREAT SERPL-MCNC: 1.05 MG/DL (ref 0.6–1.3)
DIFFERENTIAL METHOD BLD: ABNORMAL
EOSINOPHIL # BLD: 0 K/UL (ref 0–0.4)
EOSINOPHIL NFR BLD: 1 % (ref 0–5)
EPITH CASTS URNS QL MICRO: NORMAL /LPF (ref 0–5)
ERYTHROCYTE [DISTWIDTH] IN BLOOD BY AUTOMATED COUNT: 13.3 % (ref 11.6–14.5)
EST. AVERAGE GLUCOSE BLD GHB EST-MCNC: 151 MG/DL
GLOBULIN SER CALC-MCNC: 3.1 G/DL (ref 2–4)
GLUCOSE SERPL-MCNC: 122 MG/DL (ref 74–99)
GLUCOSE UR STRIP.AUTO-MCNC: NEGATIVE MG/DL
HBA1C MFR BLD: 6.9 % (ref 4.2–5.6)
HCT VFR BLD AUTO: 41.6 % (ref 36–48)
HDLC SERPL-MCNC: 48 MG/DL (ref 40–60)
HDLC SERPL: 4.1 {RATIO} (ref 0–5)
HGB BLD-MCNC: 13.3 G/DL (ref 13–16)
HGB UR QL STRIP: NEGATIVE
KETONES UR QL STRIP.AUTO: NEGATIVE MG/DL
LDLC SERPL CALC-MCNC: 127.6 MG/DL (ref 0–100)
LEUKOCYTE ESTERASE UR QL STRIP.AUTO: NEGATIVE
LIPID PROFILE,FLP: ABNORMAL
LYMPHOCYTES # BLD AUTO: 47 % (ref 21–52)
LYMPHOCYTES # BLD: 1.4 K/UL (ref 0.9–3.6)
MCH RBC QN AUTO: 27 PG (ref 24–34)
MCHC RBC AUTO-ENTMCNC: 32 G/DL (ref 31–37)
MCV RBC AUTO: 84.4 FL (ref 74–97)
MONOCYTES # BLD: 0.2 K/UL (ref 0.05–1.2)
MONOCYTES NFR BLD AUTO: 7 % (ref 3–10)
NEUTS SEG # BLD: 1.4 K/UL (ref 1.8–8)
NEUTS SEG NFR BLD AUTO: 45 % (ref 40–73)
NITRITE UR QL STRIP.AUTO: NEGATIVE
PH UR STRIP: 5 [PH] (ref 5–8)
PLATELET # BLD AUTO: 139 K/UL (ref 135–420)
PMV BLD AUTO: 11.5 FL (ref 9.2–11.8)
POTASSIUM SERPL-SCNC: 4.3 MMOL/L (ref 3.5–5.5)
PROT SERPL-MCNC: 6.9 G/DL (ref 6.4–8.2)
PROT UR STRIP-MCNC: NEGATIVE MG/DL
PSA SERPL-MCNC: 1.2 NG/ML (ref 0–4)
RBC # BLD AUTO: 4.93 M/UL (ref 4.7–5.5)
RBC #/AREA URNS HPF: 0 /HPF (ref 0–5)
SODIUM SERPL-SCNC: 140 MMOL/L (ref 136–145)
SP GR UR REFRACTOMETRY: 1.02 (ref 1–1.03)
T4 FREE SERPL-MCNC: 1.2 NG/DL (ref 0.7–1.5)
TRIGL SERPL-MCNC: 107 MG/DL (ref ?–150)
TSH SERPL DL<=0.05 MIU/L-ACNC: 1.41 UIU/ML (ref 0.36–3.74)
UROBILINOGEN UR QL STRIP.AUTO: 0.2 EU/DL (ref 0.2–1)
VLDLC SERPL CALC-MCNC: 21.4 MG/DL
WBC # BLD AUTO: 3.1 K/UL (ref 4.6–13.2)
WBC URNS QL MICRO: NORMAL /HPF (ref 0–4)

## 2017-07-05 PROCEDURE — 84439 ASSAY OF FREE THYROXINE: CPT | Performed by: INTERNAL MEDICINE

## 2017-07-05 PROCEDURE — 82043 UR ALBUMIN QUANTITATIVE: CPT | Performed by: INTERNAL MEDICINE

## 2017-07-05 PROCEDURE — 84443 ASSAY THYROID STIM HORMONE: CPT | Performed by: INTERNAL MEDICINE

## 2017-07-05 PROCEDURE — 80061 LIPID PANEL: CPT | Performed by: INTERNAL MEDICINE

## 2017-07-05 PROCEDURE — 85025 COMPLETE CBC W/AUTO DIFF WBC: CPT | Performed by: INTERNAL MEDICINE

## 2017-07-05 PROCEDURE — 36415 COLL VENOUS BLD VENIPUNCTURE: CPT | Performed by: INTERNAL MEDICINE

## 2017-07-05 PROCEDURE — 81001 URINALYSIS AUTO W/SCOPE: CPT | Performed by: INTERNAL MEDICINE

## 2017-07-05 PROCEDURE — 82306 VITAMIN D 25 HYDROXY: CPT | Performed by: INTERNAL MEDICINE

## 2017-07-05 PROCEDURE — 84153 ASSAY OF PSA TOTAL: CPT | Performed by: INTERNAL MEDICINE

## 2017-07-05 PROCEDURE — 80053 COMPREHEN METABOLIC PANEL: CPT | Performed by: INTERNAL MEDICINE

## 2017-07-05 PROCEDURE — 83036 HEMOGLOBIN GLYCOSYLATED A1C: CPT | Performed by: INTERNAL MEDICINE

## 2017-07-06 LAB
25(OH)D3 SERPL-MCNC: 32.4 NG/ML (ref 30–100)
CREAT UR-MCNC: 158.59 MG/DL (ref 30–125)
MICROALBUMIN UR-MCNC: 0.9 MG/DL (ref 0–3)
MICROALBUMIN/CREAT UR-RTO: 6 MG/G (ref 0–30)

## 2017-07-11 ENCOUNTER — OFFICE VISIT (OUTPATIENT)
Dept: SURGERY | Age: 67
End: 2017-07-11

## 2017-07-11 VITALS
HEART RATE: 82 BPM | TEMPERATURE: 97.9 F | BODY MASS INDEX: 26.31 KG/M2 | RESPIRATION RATE: 17 BRPM | SYSTOLIC BLOOD PRESSURE: 118 MMHG | HEIGHT: 74 IN | WEIGHT: 205 LBS | DIASTOLIC BLOOD PRESSURE: 65 MMHG

## 2017-07-11 DIAGNOSIS — Z09 POSTOPERATIVE EXAMINATION: Primary | ICD-10-CM

## 2017-07-11 DIAGNOSIS — L72.0 INCLUSION CYST: ICD-10-CM

## 2017-07-11 NOTE — PROGRESS NOTES
Maria Antonia Palmer. RASTA Yañez  PROGRESS NOTE    Name: Dorothea Gonzalez MRN: [id number]   : 1950 Hospital: [facility]   Date: 2017 Admission Date: No admission date for patient encounter. Subjective:  Mr. Elysia Junior is a very pleasant 62-year-old AA male who had an excisional biopsy of his right upper arm posterior surface on 2017. Pathology shows this to be an inclusion cyst which I reviewed with patient and his wife. He returns today with no complaints to include no fever, no chills, and no drainage of any kind from his incision site. He does have Steri-Strips in place which his wife said she replaced after he pulled the initial Steri-Strips off after surgery. We removed these and incision is healing well. Concerned about the residual scar asking if it is a keloid. At this point, it does not resemble a keloid but we discussed some skin products that could aide in scar formation and healing at this point. Objective:  Vitals:    17 1105   BP: 118/65   Pulse: 82   Resp: 17   Temp: 97.9 °F (36.6 °C)   TempSrc: Oral   Weight: 93 kg (205 lb)   Height: 6' 2\" (1.88 m)        Physical Exam:   General:  Well developed well nourished male in no apparent distress   Skin: Incision to posterior surface of right upper arm incision is clean, dry, and intact. No  edema, no erythema, and no drainage of any kind. Labs:  No results found for this or any previous visit (from the past 24 hour(s)). Current Medications:  Current Outpatient Prescriptions   Medication Sig Dispense Refill    simvastatin (ZOCOR) 10 mg tablet Take 1 Tab by mouth nightly. 90 Tab 3    cholecalciferol (VITAMIN D3) 1,000 unit cap Take 1,000 Units by mouth daily.  oxyCODONE-acetaminophen (PERCOCET) 5-325 mg per tablet Take 1-2 Tabs by mouth every four (4) hours as needed for Pain. Max Daily Amount: 12 Tabs. 40 Tab 0    docusate sodium (COLACE) 100 mg capsule Take 1 Cap by mouth two (2) times a day for 30 days.  [facility] Cap 0    docusate sodium (COLACE) 100 mg capsule Take 1 Cap by mouth two (2) times a day for 30 days. 60 Cap 0       Chart and notes reviewed. Data reviewed. I have evaluated and examined the patient. IMPRESSION:   · Patient almost 2 weeks out from excisional biopsy of right upper arm here today doing well. PLAN:/DISCUSION:   · May shower and resume normal skin care. · May consider bio oil or Mederma to reduce scar formation   · Follow-up as needed      Mr. Soheila Diez has a reminder for a \"due or due soon\" health maintenance. I have asked that he contact his primary care provider for follow-up on this health maintenance. Laura Calix.  Azar Brown, VIJAYC

## 2017-07-11 NOTE — PATIENT INSTRUCTIONS
Epidermoid Cyst: Care Instructions  Your Care Instructions  An epidermoid (say \"mn-xgo-YTN-rakesh\") cyst is a lump just under the skin. These cysts can form when a hair follicle becomes blocked. They are common in acne and may occur on the face, neck, back, and genitals. However, they can form anywhere on the body. These cysts are not cancer and do not lead to cancer. They tend not to hurt, but they can sometimes become swollen and painful. They also may break open (rupture) and cause scarring. These cysts sometimes do not cause problems and may not need treatment. If you have a cyst that is swollen and hurts, your doctor may inject it with a medicine to help it heal. But it is more likely that a painful cyst will need to be removed. Your doctor will give you a shot of numbing medicine and cut into the cyst to drain it or remove it. This makes the symptoms go away. Follow-up care is a key part of your treatment and safety. Be sure to make and go to all appointments, and call your doctor if you are having problems. Its also a good idea to know your test results and keep a list of the medicines you take. How can you care for yourself at home? · Do not squeeze the cyst or poke it with a needle to open it. This can cause swelling, redness, and infection. · Always have a doctor look at any new lumps you get to make sure that they are not serious. When should you call for help? Watch closely for changes in your health, and be sure to contact your doctor if:  · You have a fever, redness, or swelling after you get a shot of medicine in the cyst.  · You see or feel a new lump on your skin. Where can you learn more? Go to http://yanet-sergio.info/. Enter B203 in the search box to learn more about \"Epidermoid Cyst: Care Instructions. \"  Current as of: October 13, 2016  Content Version: 11.3  © 0094-6557 [employer].  Care instructions adapted under license by [employer] (which disclaims liability or warranty for this information). If you have questions about a medical condition or this instruction, always ask your healthcare professional. [address] any warranty or liability for your use of this information.

## 2017-07-12 ENCOUNTER — OFFICE VISIT (OUTPATIENT)
Dept: INTERNAL MEDICINE CLINIC | Age: 67
End: 2017-07-12

## 2017-07-12 VITALS
OXYGEN SATURATION: 97 % | WEIGHT: 205.8 LBS | HEIGHT: 74 IN | SYSTOLIC BLOOD PRESSURE: 130 MMHG | DIASTOLIC BLOOD PRESSURE: 82 MMHG | TEMPERATURE: 98.5 F | HEART RATE: 70 BPM | BODY MASS INDEX: 26.41 KG/M2

## 2017-07-12 DIAGNOSIS — E11.9 CONTROLLED TYPE 2 DIABETES MELLITUS WITHOUT COMPLICATION, WITHOUT LONG-TERM CURRENT USE OF INSULIN (HCC): Primary | ICD-10-CM

## 2017-07-12 DIAGNOSIS — E78.5 HYPERLIPIDEMIA, UNSPECIFIED HYPERLIPIDEMIA TYPE: ICD-10-CM

## 2017-07-12 DIAGNOSIS — I82.432 DEEP VEIN THROMBOSIS (DVT) OF POPLITEAL VEIN OF LEFT LOWER EXTREMITY, UNSPECIFIED CHRONICITY (HCC): ICD-10-CM

## 2017-07-12 DIAGNOSIS — I10 ESSENTIAL HYPERTENSION: ICD-10-CM

## 2017-07-12 DIAGNOSIS — K21.9 GASTROESOPHAGEAL REFLUX DISEASE, ESOPHAGITIS PRESENCE NOT SPECIFIED: ICD-10-CM

## 2017-07-12 DIAGNOSIS — Z23 ENCOUNTER FOR IMMUNIZATION: ICD-10-CM

## 2017-07-12 DIAGNOSIS — Z80.42 FAMILY HISTORY OF PROSTATE CANCER: ICD-10-CM

## 2017-07-12 NOTE — PATIENT INSTRUCTIONS
Advance Directives: Care Instructions  Your Care Instructions  An advance directive is a legal way to state your wishes at the end of your life. It tells your family and your doctor what to do if you can no longer say what you want. There are two main types of advance directives. You can change them any time that your wishes change. · A living will tells your family and your doctor your wishes about life support and other treatment. · A durable power of  for health care lets you name a person to make treatment decisions for you when you can't speak for yourself. This person is called a health care agent. If you do not have an advance directive, decisions about your medical care may be made by a doctor or a  who doesn't know you. It may help to think of an advance directive as a gift to the people who care for you. If you have one, they won't have to make tough decisions by themselves. Follow-up care is a key part of your treatment and safety. Be sure to make and go to all appointments, and call your doctor if you are having problems. It's also a good idea to know your test results and keep a list of the medicines you take. How can you care for yourself at home? · Discuss your wishes with your loved ones and your doctor. This way, there are no surprises. · Many states have a unique form. Or you might use a universal form that has been approved by many states. This kind of form can sometimes be completed and stored online. Your electronic copy will then be available wherever you have a connection to the Internet. In most cases, doctors will respect your wishes even if you have a form from a different state. · You don't need a  to do an advance directive. But you may want to get legal advice. · Think about these questions when you prepare an advance directive:  ¨ Who do you want to make decisions about your medical care if you are not able to?  Many people choose a family member or close friend. ¨ Do you know enough about life support methods that might be used? If not, talk to your doctor so you understand. ¨ What are you most afraid of that might happen? You might be afraid of having pain, losing your independence, or being kept alive by machines. ¨ Where would you prefer to die? Choices include your home, a hospital, or a nursing home. ¨ Would you like to have information about hospice care to support you and your family? ¨ Do you want to donate organs when you die? ¨ Do you want certain Spiritism practices performed before you die? If so, put your wishes in the advance directive. · Read your advance directive every year, and make changes as needed. When should you call for help? Be sure to contact your doctor if you have any questions. Where can you learn more? Go to http://yanetMyntrasergio.info/. Enter R264 in the search box to learn more about \"Advance Directives: Care Instructions. \"  Current as of: November 17, 2016  Content Version: 11.3  © 2570-5891 [employer]. Care instructions adapted under license by [employer] (which disclaims liability or warranty for this information). If you have questions about a medical condition or this instruction, always ask your healthcare professional. [address] any warranty or liability for your use of this information. Learning About Diabetes Food Guidelines  Your Care Instructions  Meal planning is important to manage diabetes. It helps keep your blood sugar at a target level (which you set with your doctor). You don't have to eat special foods. You can eat what your family eats, including sweets once in a while. But you do have to pay attention to how often you eat and how much you eat of certain foods. You may want to work with a dietitian or a certified diabetes educator (CDE) to help you plan meals and snacks.  A dietitian or CDE can also help you lose weight if that is one of your goals. What should you know about eating carbs? Managing the amount of carbohydrate (carbs) you eat is an important part of healthy meals when you have diabetes. Carbohydrate is found in many foods. · Learn which foods have carbs. And learn the amounts of carbs in different foods. ¨ Bread, cereal, pasta, and rice have about 15 grams of carbs in a serving. A serving is 1 slice of bread (1 ounce), ½ cup of cooked cereal, or 1/3 cup of cooked pasta or rice. ¨ Fruits have 15 grams of carbs in a serving. A serving is 1 small fresh fruit, such as an apple or orange; ½ of a banana; ½ cup of cooked or canned fruit; ½ cup of fruit juice; 1 cup of melon or raspberries; or 2 tablespoons of dried fruit. ¨ Milk and no-sugar-added yogurt have 15 grams of carbs in a serving. A serving is 1 cup of milk or 2/3 cup of no-sugar-added yogurt. ¨ Starchy vegetables have 15 grams of carbs in a serving. A serving is ½ cup of mashed potatoes or sweet potato; 1 cup winter squash; ½ of a small baked potato; ½ cup of cooked beans; or ½ cup cooked corn or green peas. · Learn how much carbs to eat each day and at each meal. A dietitian or CDE can teach you how to keep track of the amount of carbs you eat. This is called carbohydrate counting. · If you are not sure how to count carbohydrate grams, use the Plate Method to plan meals. It is a good, quick way to make sure that you have a balanced meal. It also helps you spread carbs throughout the day. ¨ Divide your plate by types of foods. Put non-starchy vegetables on half the plate, meat or other protein food on one-quarter of the plate, and a grain or starchy vegetable in the final quarter of the plate.  To this you can add a small piece of fruit and 1 cup of milk or yogurt, depending on how many carbs you are supposed to eat at a meal.  · Try to eat about the same amount of carbs at each meal. Do not \"save up\" your daily allowance of carbs to eat at one meal.  · Proteins have very little or no carbs per serving. Examples of proteins are beef, chicken, turkey, fish, eggs, tofu, cheese, cottage cheese, and peanut butter. A serving size of meat is 3 ounces, which is about the size of a deck of cards. Examples of meat substitute serving sizes (equal to 1 ounce of meat) are 1/4 cup of cottage cheese, 1 egg, 1 tablespoon of peanut butter, and ½ cup of tofu. How can you eat out and still eat healthy? · Learn to estimate the serving sizes of foods that have carbohydrate. If you measure food at home, it will be easier to estimate the amount in a serving of restaurant food. · If the meal you order has too much carbohydrate (such as potatoes, corn, or baked beans), ask to have a low-carbohydrate food instead. Ask for a salad or green vegetables. · If you use insulin, check your blood sugar before and after eating out to help you plan how much to eat in the future. · If you eat more carbohydrate at a meal than you had planned, take a walk or do other exercise. This will help lower your blood sugar. What else should you know? · Limit saturated fat, such as the fat from meat and dairy products. This is a healthy choice because people who have diabetes are at higher risk of heart disease. So choose lean cuts of meat and nonfat or low-fat dairy products. Use olive or canola oil instead of butter or shortening when cooking. · Don't skip meals. Your blood sugar may drop too low if you skip meals and take insulin or certain medicines for diabetes. · Check with your doctor before you drink alcohol. Alcohol can cause your blood sugar to drop too low. Alcohol can also cause a bad reaction if you take certain diabetes medicines. Follow-up care is a key part of your treatment and safety. Be sure to make and go to all appointments, and call your doctor if you are having problems. It's also a good idea to know your test results and keep a list of the medicines you take.   Where can you learn more? Go to http://yanet-sergio.info/. Enter N512 in the search box to learn more about \"Learning About Diabetes Food Guidelines. \"  Current as of: March 13, 2017  Content Version: 11.3  © 8072-8068 [employer]. Care instructions adapted under license by [employer] (which disclaims liability or warranty for this information). If you have questions about a medical condition or this instruction, always ask your healthcare professional. [address] any warranty or liability for your use of this information. Learning About Meal Planning for Diabetes  Why plan your meals? Meal planning can be a key part of managing diabetes. Planning meals and snacks with the right balance of carbohydrate, protein, and fat can help you keep your blood sugar at the target level you set with your doctor. You don't have to eat special foods. You can eat what your family eats, including sweets once in a while. But you do have to pay attention to how often you eat and how much you eat of certain foods. You may want to work with a dietitian or a certified diabetes educator. He or she can give you tips and meal ideas and can answer your questions about meal planning. This health professional can also help you reach a healthy weight if that is one of your goals. What plan is right for you? Your dietitian or diabetes educator may suggest that you start with the plate format or carbohydrate counting. The plate format  The plate format is a simple way to help you manage how you eat. You plan meals by learning how much space each food should take on a plate. Using the plate format helps you spread carbohydrate throughout the day. It can make it easier to keep your blood sugar level within your target range. It also helps you see if you're eating healthy portion sizes. To use the plate format, you put non-starchy vegetables on half your plate.  Add meat or meat substitutes on one-quarter of the plate. Put a grain or starchy vegetable (such as brown rice or a potato) on the final quarter of the plate. You can add a small piece of fruit and some low-fat or fat-free milk or yogurt, depending on your carbohydrate goal for each meal.  Here are some tips for using the plate format:  · Make sure that you are not using an oversized plate. A 9-inch plate is best. Many restaurants use larger plates. · Get used to using the plate format at home. Then you can use it when you eat out. · Write down your questions about using the plate format. Talk to your doctor, a dietitian, or a diabetes educator about your concerns. Carbohydrate counting  With carbohydrate counting, you plan meals based on the amount of carbohydrate in each food. Carbohydrate raises blood sugar higher and more quickly than any other nutrient. It is found in desserts, breads and cereals, and fruit. It's also found in starchy vegetables such as potatoes and corn, grains such as rice and pasta, and milk and yogurt. Spreading carbohydrate throughout the day helps keep your blood sugar levels within your target range. Your daily amount depends on several things, including your weight, how active you are, which diabetes medicines you take, and what your goals are for your blood sugar levels. A registered dietitian or diabetes educator can help you plan how much carbohydrate to include in each meal and snack. A guideline for your daily amount of carbohydrate is:  · 45 to 60 grams at each meal. That's about the same as 3 to 4 carbohydrate servings. · 15 to 20 grams at each snack. That's about the same as 1 carbohydrate serving. The Nutrition Facts label on packaged foods tells you how much carbohydrate is in a serving of the food. First, look at the serving size on the food label. Is that the amount you eat in a serving? All of the nutrition information on a food label is based on that serving size.  So if you eat more or less than that, you'll need to adjust the other numbers. Total carbohydrate is the next thing you need to look for on the label. If you count carbohydrate servings, one serving of carbohydrate is 15 grams. For foods that don't come with labels, such as fresh fruits and vegetables, you'll need a guide that lists carbohydrate in these foods. Ask your doctor, dietitian, or diabetes educator about books or other nutrition guides you can use. If you take insulin, you need to know how many grams of carbohydrate are in a meal. This lets you know how much rapid-acting insulin to take before you eat. If you use an insulin pump, you get a constant rate of insulin during the day. So the pump must be programmed at meals to give you extra insulin to cover the rise in blood sugar after meals. When you know how much carbohydrate you will eat, you can take the right amount of insulin. Or, if you always use the same amount of insulin, you need to make sure that you eat the same amount of carbohydrate at meals. If you need more help to understand carbohydrate counting and food labels, ask your doctor, dietitian, or diabetes educator. How do you get started with meal planning? Here are some tips to get started:  · Plan your meals a week at a time. Don't forget to include snacks too. · Use cookbooks or online recipes to plan several main meals. Plan some quick meals for busy nights. You also can double some recipes that freeze well. Then you can save half for other busy nights when you don't have time to cook. · Make sure you have the ingredients you need for your recipes. If you're running low on basic items, put these items on your shopping list too. · List foods that you use to make breakfasts, lunches, and snacks. List plenty of fruits and vegetables. · Post this list on the refrigerator. Add to it as you think of more things you need. · Take the list to the store to do your weekly shopping.   Follow-up care is a key part of your treatment and safety. Be sure to make and go to all appointments, and call your doctor if you are having problems. It's also a good idea to know your test results and keep a list of the medicines you take. Where can you learn more? Go to http://yanet-sergio.info/. Deuce Gonzalez in the search box to learn more about \"Learning About Meal Planning for Diabetes. \"  Current as of: March 13, 2017  Content Version: 11.3  © 2844-6898 [employer]. Care instructions adapted under license by [employer] (which disclaims liability or warranty for this information). If you have questions about a medical condition or this instruction, always ask your healthcare professional. [address] any warranty or liability for your use of this information.

## 2017-07-12 NOTE — PROGRESS NOTES
1. Have you been to the ER, urgent care clinic or hospitalized since your last visit? YES. June 30, 2017 cyst removal at [facility].     2. Have you seen or consulted any other health care providers outside of the [address] [place] [address] since your last visit (Include any pap smears or colon screening)? NO      Do you have an Advanced Directive? NO    Would you like information on Advanced Directives? YES    Health Maintenance Due   Topic Date Due    Pneumococcal 19-64 Medium Risk (1 of 1 - PPSV23) 08/31/1969    FOOT EXAM Q1  02/17/2017     Patient given Pneumovax 23 injected into left deltoid without complications or adverse reactions and tolerated well. Patient also given paper handout of the pneumococcal injection education.

## 2017-07-12 NOTE — MR AVS SNAPSHOT
Visit Information Date & Time Provider Department Dept. Phone Encounter #  
 7/12/2017  8:30 AM Anand Ruff MD [facility] [phone] [id number] Follow-up Instructions Return in about 3 months (around 10/12/2017), or if symptoms worsen or fail to improve. Your Appointments 10/5/2017 10:15 AM  
LAB with IOC NURSE VISIT Internist of [facility] ([facility]) Appt Note: lab [facility]arris [address], [address] [place] [address] [place]  
  
   
 [address], [address]  
  
    
 10/12/2017  8:30 AM  
Office Visit with Anand Ruff MD  
Internist of [address]) Appt Note: ov 3mos. sarris [address], Suite [facility] [address]  
  
   
 [address], [address] Upcoming Health Maintenance Date Due Pneumococcal 19-64 Medium Risk (1 of 1 - PPSV23) 8/31/1969 FOOT EXAM Q1 2/17/2017 INFLUENZA AGE 9 TO ADULT 8/1/2017 EYE EXAM RETINAL OR DILATED Q1 9/28/2017 HEMOGLOBIN A1C Q6M 1/5/2018 MICROALBUMIN Q1 7/5/2018 LIPID PANEL Q1 7/5/2018 COLONOSCOPY 3/20/2022 DTaP/Tdap/Td series (2 - Td) 1/4/2027 Allergies as of 7/12/2017  Review Complete On: 7/12/2017 By: Leisa Diego No Known Allergies Current Immunizations  Reviewed on 1/4/2017 Name Date Pneumococcal Polysaccharide (PPSV-23)  Incomplete TD Vaccine 9/7/2007 Tdap 1/4/2017 Zoster Vaccine, Live 7/30/2013 Not reviewed this visit You Were Diagnosed With   
  
 Codes Comments Controlled type 2 diabetes mellitus without complication, without long-term current use of insulin ([facility]    -  Primary ICD-10-CM: E11.9 ICD-9-CM: 250.00 Encounter for immunization     ICD-10-CM: L93 ICD-9-CM: V03.89 Vitals BP Pulse Temp Height(growth percentile) Weight(growth percentile) SpO2 130/82 70 98.5 °F (36.9 °C) (Oral) 6' 2\" (1.88 m) 205 lb 12.8 oz (93.4 kg) 97% BMI Smoking Status 26.42 kg/m2 Former Smoker Vitals History BMI and BSA Data Body Mass Index Body Surface Area  
 26.42 kg/m 2 2.21 m 2 Preferred Pharmacy Pharmacy Name Phone [facility] [facility] [address] [phone] Your Updated Medication List  
  
   
This list is accurate as of: 7/12/17  9:25 AM.  Always use your most recent med list.  
  
  
  
  
 cholecalciferol 1,000 unit Cap Commonly known as:  VITAMIN D3 Take 1,000 Units by mouth daily. docusate sodium 100 mg capsule Commonly known as:  Eilleen Schneiders Take 1 Cap by mouth two (2) times a day for 30 days. oxyCODONE-acetaminophen 5-325 mg per tablet Commonly known as:  PERCOCET Take 1-2 Tabs by mouth every four (4) hours as needed for Pain. Max Daily Amount: 12 Tabs. simvastatin 10 mg tablet Commonly known as:  ZOCOR Take 1 Tab by mouth nightly. We Performed the Following  DIABETES FOOT EXAM [HM7 Custom] PNEUMOCOCCAL POLYSACCHARIDE VACCINE, 23-VALENT, ADULT OR IMMUNOSUPPRESSED PT DOSE, [05759 CPT(R)] REFERRAL TO DIABETIC EDUCATION [REF20 Custom] Follow-up Instructions Return in about 3 months (around 10/12/2017), or if symptoms worsen or fail to improve. Referral Information Referral ID Referred By Referred To  
  
 [id number] Noreen Khan Not Available Visits Status Start Date End Date 1 New Request 7/12/17 7/12/18 If your referral has a status of pending review or denied, additional information will be sent to support the outcome of this decision. Patient Instructions Advance Directives: Care Instructions Your Care Instructions An advance directive is a legal way to state your wishes at the end of your life.  It tells your family and your doctor what to do if you can no longer say what you want. There are two main types of advance directives. You can change them any time that your wishes change. · A living will tells your family and your doctor your wishes about life support and other treatment. · A durable power of  for health care lets you name a person to make treatment decisions for you when you can't speak for yourself. This person is called a health care agent. If you do not have an advance directive, decisions about your medical care may be made by a doctor or a  who doesn't know you. It may help to think of an advance directive as a gift to the people who care for you. If you have one, they won't have to make tough decisions by themselves. Follow-up care is a key part of your treatment and safety. Be sure to make and go to all appointments, and call your doctor if you are having problems. It's also a good idea to know your test results and keep a list of the medicines you take. How can you care for yourself at home? · Discuss your wishes with your loved ones and your doctor. This way, there are no surprises. · Many states have a unique form. Or you might use a universal form that has been approved by many states. This kind of form can sometimes be completed and stored online. Your electronic copy will then be available wherever you have a connection to the Internet. In most cases, doctors will respect your wishes even if you have a form from a different state. · You don't need a  to do an advance directive. But you may want to get legal advice. · Think about these questions when you prepare an advance directive: ¨ Who do you want to make decisions about your medical care if you are not able to? Many people choose a family member or close friend. ¨ Do you know enough about life support methods that might be used? If not, talk to your doctor so you understand. ¨ What are you most afraid of that might happen?  You might be afraid of having pain, losing your independence, or being kept alive by machines. ¨ Where would you prefer to die? Choices include your home, a hospital, or a nursing home. ¨ Would you like to have information about hospice care to support you and your family? ¨ Do you want to donate organs when you die? ¨ Do you want certain Protestant practices performed before you die? If so, put your wishes in the advance directive. · Read your advance directive every year, and make changes as needed. When should you call for help? Be sure to contact your doctor if you have any questions. Where can you learn more? Go to http://yanet5minutessergio.info/. Enter R264 in the search box to learn more about \"Advance Directives: Care Instructions. \" Current as of: November 17, 2016 Content Version: 11.3 © 0686-8483 [employer]. Care instructions adapted under license by View[employer] (which disclaims liability or warranty for this information). If you have questions about a medical condition or this instruction, always ask your healthcare professional. [place] [address] any warranty or liability for your use of this information. Learning About Diabetes Food Guidelines Your Care Instructions Meal planning is important to manage diabetes. It helps keep your blood sugar at a target level (which you set with your doctor). You don't have to eat special foods. You can eat what your family eats, including sweets once in a while. But you do have to pay attention to how often you eat and how much you eat of certain foods. You may want to work with a dietitian or a certified diabetes educator (CDE) to help you plan meals and snacks. A dietitian or CDE can also help you lose weight if that is one of your goals. What should you know about eating carbs?  
Managing the amount of carbohydrate (carbs) you eat is an important part of healthy meals when you have diabetes. Carbohydrate is found in many foods. · Learn which foods have carbs. And learn the amounts of carbs in different foods. ¨ Bread, cereal, pasta, and rice have about 15 grams of carbs in a serving. A serving is 1 slice of bread (1 ounce), ½ cup of cooked cereal, or 1/3 cup of cooked pasta or rice. ¨ Fruits have 15 grams of carbs in a serving. A serving is 1 small fresh fruit, such as an apple or orange; ½ of a banana; ½ cup of cooked or canned fruit; ½ cup of fruit juice; 1 cup of melon or raspberries; or 2 tablespoons of dried fruit. ¨ Milk and no-sugar-added yogurt have 15 grams of carbs in a serving. A serving is 1 cup of milk or 2/3 cup of no-sugar-added yogurt. ¨ Starchy vegetables have 15 grams of carbs in a serving. A serving is ½ cup of mashed potatoes or sweet potato; 1 cup winter squash; ½ of a small baked potato; ½ cup of cooked beans; or ½ cup cooked corn or green peas. · Learn how much carbs to eat each day and at each meal. A dietitian or CDE can teach you how to keep track of the amount of carbs you eat. This is called carbohydrate counting. · If you are not sure how to count carbohydrate grams, use the Plate Method to plan meals. It is a good, quick way to make sure that you have a balanced meal. It also helps you spread carbs throughout the day. ¨ Divide your plate by types of foods. Put non-starchy vegetables on half the plate, meat or other protein food on one-quarter of the plate, and a grain or starchy vegetable in the final quarter of the plate. To this you can add a small piece of fruit and 1 cup of milk or yogurt, depending on how many carbs you are supposed to eat at a meal. 
· Try to eat about the same amount of carbs at each meal. Do not \"save up\" your daily allowance of carbs to eat at one meal. 
· Proteins have very little or no carbs per serving.  Examples of proteins are beef, chicken, turkey, fish, eggs, tofu, cheese, cottage cheese, and peanut butter. A serving size of meat is 3 ounces, which is about the size of a deck of cards. Examples of meat substitute serving sizes (equal to 1 ounce of meat) are 1/4 cup of cottage cheese, 1 egg, 1 tablespoon of peanut butter, and ½ cup of tofu. How can you eat out and still eat healthy? · Learn to estimate the serving sizes of foods that have carbohydrate. If you measure food at home, it will be easier to estimate the amount in a serving of restaurant food. · If the meal you order has too much carbohydrate (such as potatoes, corn, or baked beans), ask to have a low-carbohydrate food instead. Ask for a salad or green vegetables. · If you use insulin, check your blood sugar before and after eating out to help you plan how much to eat in the future. · If you eat more carbohydrate at a meal than you had planned, take a walk or do other exercise. This will help lower your blood sugar. What else should you know? · Limit saturated fat, such as the fat from meat and dairy products. This is a healthy choice because people who have diabetes are at higher risk of heart disease. So choose lean cuts of meat and nonfat or low-fat dairy products. Use olive or canola oil instead of butter or shortening when cooking. · Don't skip meals. Your blood sugar may drop too low if you skip meals and take insulin or certain medicines for diabetes. · Check with your doctor before you drink alcohol. Alcohol can cause your blood sugar to drop too low. Alcohol can also cause a bad reaction if you take certain diabetes medicines. Follow-up care is a key part of your treatment and safety. Be sure to make and go to all appointments, and call your doctor if you are having problems. It's also a good idea to know your test results and keep a list of the medicines you take. Where can you learn more? Go to http://yanet-sergio.info/.  
Enter V053 in the search box to learn more about \"Learning About Diabetes Food Guidelines. \" Current as of: March 13, 2017 Content Version: 11.3 © 9686-5484 [employer]. Care instructions adapted under license by [employer] (which disclaims liability or warranty for this information). If you have questions about a medical condition or this instruction, always ask your healthcare professional. [address] any warranty or liability for your use of this information. Learning About Meal Planning for Diabetes Why plan your meals? Meal planning can be a key part of managing diabetes. Planning meals and snacks with the right balance of carbohydrate, protein, and fat can help you keep your blood sugar at the target level you set with your doctor. You don't have to eat special foods. You can eat what your family eats, including sweets once in a while. But you do have to pay attention to how often you eat and how much you eat of certain foods. You may want to work with a dietitian or a certified diabetes educator. He or she can give you tips and meal ideas and can answer your questions about meal planning. This health professional can also help you reach a healthy weight if that is one of your goals. What plan is right for you? Your dietitian or diabetes educator may suggest that you start with the plate format or carbohydrate counting. The plate format The plate format is a simple way to help you manage how you eat. You plan meals by learning how much space each food should take on a plate. Using the plate format helps you spread carbohydrate throughout the day. It can make it easier to keep your blood sugar level within your target range. It also helps you see if you're eating healthy portion sizes. To use the plate format, you put non-starchy vegetables on half your plate. Add meat or meat substitutes on one-quarter of the plate.  Put a grain or starchy vegetable (such as brown rice or a potato) on the final quarter of the plate. You can add a small piece of fruit and some low-fat or fat-free milk or yogurt, depending on your carbohydrate goal for each meal. 
Here are some tips for using the plate format: · Make sure that you are not using an oversized plate. A 9-inch plate is best. Many restaurants use larger plates. · Get used to using the plate format at home. Then you can use it when you eat out. · Write down your questions about using the plate format. Talk to your doctor, a dietitian, or a diabetes educator about your concerns. Carbohydrate counting With carbohydrate counting, you plan meals based on the amount of carbohydrate in each food. Carbohydrate raises blood sugar higher and more quickly than any other nutrient. It is found in desserts, breads and cereals, and fruit. It's also found in starchy vegetables such as potatoes and corn, grains such as rice and pasta, and milk and yogurt. Spreading carbohydrate throughout the day helps keep your blood sugar levels within your target range. Your daily amount depends on several things, including your weight, how active you are, which diabetes medicines you take, and what your goals are for your blood sugar levels. A registered dietitian or diabetes educator can help you plan how much carbohydrate to include in each meal and snack. A guideline for your daily amount of carbohydrate is: · 45 to 60 grams at each meal. That's about the same as 3 to 4 carbohydrate servings. · 15 to 20 grams at each snack. That's about the same as 1 carbohydrate serving. The Nutrition Facts label on packaged foods tells you how much carbohydrate is in a serving of the food. First, look at the serving size on the food label. Is that the amount you eat in a serving? All of the nutrition information on a food label is based on that serving size. So if you eat more or less than that, you'll need to adjust the other numbers. Total carbohydrate is the next thing you need to look for on the label. If you count carbohydrate servings, one serving of carbohydrate is 15 grams. For foods that don't come with labels, such as fresh fruits and vegetables, you'll need a guide that lists carbohydrate in these foods. Ask your doctor, dietitian, or diabetes educator about books or other nutrition guides you can use. If you take insulin, you need to know how many grams of carbohydrate are in a meal. This lets you know how much rapid-acting insulin to take before you eat. If you use an insulin pump, you get a constant rate of insulin during the day. So the pump must be programmed at meals to give you extra insulin to cover the rise in blood sugar after meals. When you know how much carbohydrate you will eat, you can take the right amount of insulin. Or, if you always use the same amount of insulin, you need to make sure that you eat the same amount of carbohydrate at meals. If you need more help to understand carbohydrate counting and food labels, ask your doctor, dietitian, or diabetes educator. How do you get started with meal planning? Here are some tips to get started: 
· Plan your meals a week at a time. Don't forget to include snacks too. · Use cookbooks or online recipes to plan several main meals. Plan some quick meals for busy nights. You also can double some recipes that freeze well. Then you can save half for other busy nights when you don't have time to cook. · Make sure you have the ingredients you need for your recipes. If you're running low on basic items, put these items on your shopping list too. · List foods that you use to make breakfasts, lunches, and snacks. List plenty of fruits and vegetables. · Post this list on the refrigerator. Add to it as you think of more things you need. · Take the list to the store to do your weekly shopping. Follow-up care is a key part of your treatment and safety.  Be sure to make and go to all appointments, and call your doctor if you are having problems. It's also a good idea to know your test results and keep a list of the medicines you take. Where can you learn more? Go to http://yanet-sergio.info/. Susana Espitia in the search box to learn more about \"Learning About Meal Planning for Diabetes. \" Current as of: March 13, 2017 Content Version: 11.3 © 6356-8240 [employer]intit. Care instructions adapted under license by [employer] (which disclaims liability or warranty for this information). If you have questions about a medical condition or this instruction, always ask your healthcare professional. [address] any warranty or liability for your use of this information. Introducing [facility]! Dear Andre Diaz: Thank you for requesting a [email or web address] account. Our records indicate that you already have an active [email or web address] account. You can access your account anytime at [email or web address] Did you know that you can access your hospital and ER discharge instructions at any time in [email or web address]? You can also review all of your test results from your hospital stay or ER visit. Additional Information If you have questions, please visit the Frequently Asked Questions section of the [email or web address] website at [email or web address] Remember, [email or web address] is NOT to be used for urgent needs. For medical emergencies, dial 911. Now available from your iPhone and Android! Please provide this summary of care documentation to your next provider. Your primary care clinician is listed as Xu Barnes. If you have any questions after today's visit, please call [phone].

## 2017-07-16 PROBLEM — R22.31 MASS OF RIGHT UPPER EXTREMITY: Status: RESOLVED | Noted: 2017-01-08 | Resolved: 2017-07-16

## 2017-07-16 NOTE — PROGRESS NOTES
HPI:   Delmar Cunha is a 61y.o. year old male who presents today for evaluation of prehypertension, hyperlipidemia, diabetes mellitus, prior provoked DVT, and GERD. He reports that he is doing well and is currently without complaints. He had an excisional biopsy of the mass in his right posterior upper arm on 6/30/2017 by Dr. Manjeet Cespedes. Pathology showed an epidermal inclusion cyst.     He has a history of prehypertension, not treated with medications. He states that he has been monitoring his blood pressure at home and reports that it has been ranging 125-133/ 70-80. He walks several days per week for exercise, and denies any chest pain, shortness of breath at rest or with exertion, palpitations, lightheadedness, or edema. He also has a history of hyperlipidemia, treated with low intensity dose simvastatin. He has a history of diabetes mellitus, recently diagnosed in 2/2016 with HbA1c of 6.5. Fasting blood glucose have been elevated since 2010 ranging from 107-128. He denies any polyuria, polydipsia, nocturia, or blurry vision, and has no history of retinopathy, neuropathy, or nephropathy. He has regular eye exams at [facility]. He has a history of a provoked DVT, occurring in 8/2012 following an airplane ride home from [place]. Venous duplex scan (9/1/2012) at [facility] showed an acute nonocclusive DVT in the left popliteal vein, and an acute thrombus in the left gastrocnemius and soleus veins. He was treated with Lovenox and transitioned to coumadin and completed three months of therapy. 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 He reports occasional mild swelling in his left foot, but denies any calf pain or tenderness, shortness of breath, or pleuritic chest pain. He has had multiple long plane rides since that time without recurrence. He has a history of GERD, but uses over the counter antacids occasionally.  He also has a reported history of a gastric ulcer, but records are unavailable. He had a screening colonoscopy in 2011 by Dr. Natalie Bermeo showing mild diverticulosis, a 2 mm sessile polyp in the mid sigmoid colon (fulgurized), and a 5 mm polyp in the proximal sigmoid colon (pathology unavailable). He had another screening colonoscopy in 3/2017 by Dr. Natalie Bermeo showing a 3 mm sessile descending colon polyp (pathology: tubular adenoma). Follow-up recommended for five years. He denies any abdominal pain, nausea, vomiting, melena, hematochezia, or change in bowel movements. Past Medical History:   Diagnosis Date    Colon polyps     Diabetes mellitus ([address]     FH: prostate cancer     GERD (gastroesophageal reflux disease)     History of DVT (deep vein thrombosis) 2012    Provoked following airplane ride home from [place]. Venous duplex scan showed acute nonocclusive DVT in left popliteal vein; acute thrombosis left gastocnemius and soleus vein.  Hypercholesterolemia     Hypertension     Thromboembolus ([address]     leg  years ago     Past Surgical History:   Procedure Laterality Date    HX GI      colonoscopy x 3    IN EXCISION TUMOR SOFT TISSUE SHOULDER SUBQ 3+CM Right 2017    Dr. Maty Roas     Current Outpatient Prescriptions   Medication Sig    simvastatin (ZOCOR) 10 mg tablet Take 1 Tab by mouth nightly.  cholecalciferol (VITAMIN D3) 1,000 unit cap Take 1,000 Units by mouth daily.  oxyCODONE-acetaminophen (PERCOCET) 5-325 mg per tablet Take 1-2 Tabs by mouth every four (4) hours as needed for Pain. Max Daily Amount: 12 Tabs.  docusate sodium (COLACE) 100 mg capsule Take 1 Cap by mouth two (2) times a day for 30 days. No current facility-administered medications for this visit. Allergies and Intolerances:   No Known Allergies     Family History: His mother is alive and has DM. His father  of a stroke at age 80. He also had prostate cancer. His brother has prostate cancer metastatic to bone. Family History   Problem Relation Age of Onset    Diabetes Mother     Cancer Father     Stroke Father     Hypertension Father     Diabetes Brother      Social History:   He  reports that he quit smoking about 18 years ago. He has never used smokeless tobacco. He smoked 0.5 ppd for 20 years, stopping in . He is  and has one living son, and one son who is . 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 History   Alcohol Use No     Immunization History:  Immunization History   Administered Date(s) Administered    Pneumococcal Polysaccharide (PPSV-23) 2017    TD Vaccine 2007    Tdap 2017    Zoster Vaccine, Live 2013       Review of Systems:   As above included in HPI. Otherwise 11 point review of systems negative including constitutional, skin, HENT, eyes, respiratory, cardiovascular, gastrointestinal, genitourinary, musculoskeletal, endocrine, hematologic, allergy, and neurologic. Physical:   Vitals:   BP: 130/82 repeat 118/78 left arm  HR: 70  WT: 205 lb 12.8 oz (93.4 kg)  BMI:  26.42 kg/m2    Exam:   Patient appears in no apparent distress. Affect is appropriate. HEENT --Anicteric sclerae, tympanic membranes normal,  ear canals normal.  PERRL, EOMI, conjunctiva and lids normal.   Sinuses were nontender, turbinates normal, hearing normal.  Oropharynx without  erythema, normal tongue, oral mucosa and tonsils. No cervical lymphadenopathy. No thyromegaly, JVD, or bruits. Carotid pulses 2+ with normal upstroke. Lungs --Clear to auscultation. No wheezing or rales. Heart --Regular rate and rhythm, no murmurs, rubs, gallops, or clicks. Chest wall --Nontender to palpation. PMI normal.  Abdomen -- Soft and nontender, no hepatosplenomegaly or masses. Prostate  -- no asymmetry, nodularity, tenderness or enlargement  Rectal  -- normal tone, guaiac negative brown stool  Extremities -- Without cyanosis, clubbing, edema.  2+ pulses equally and bilaterally. Normal looking digits, ROM intact, small scar right posterior upper arm  Derm - no obvious abnormalities noted, no rash    Review of Data:  Labs:  Hospital Outpatient Visit on 07/05/2017   Component Date Value Ref Range Status    WBC 07/05/2017 3.1* 4.6 - 13.2 K/uL Final    RBC 07/05/2017 4.93  4.70 - 5.50 M/uL Final    HGB 07/05/2017 13.3  13.0 - 16.0 g/dL Final    HCT 07/05/2017 41.6  36.0 - 48.0 % Final    MCV 07/05/2017 84.4  74.0 - 97.0 FL Final    MCH 07/05/2017 27.0  24.0 - 34.0 PG Final    MCHC 07/05/2017 32.0  31.0 - 37.0 g/dL Final    RDW 07/05/2017 13.3  11.6 - 14.5 % Final    PLATELET 15/75/7575 621  135 - 420 K/uL Final    MPV 07/05/2017 11.5  9.2 - 11.8 FL Final    NEUTROPHILS 07/05/2017 45  40 - 73 % Final    LYMPHOCYTES 07/05/2017 47  21 - 52 % Final    MONOCYTES 07/05/2017 7  3 - 10 % Final    EOSINOPHILS 07/05/2017 1  0 - 5 % Final    BASOPHILS 07/05/2017 0  0 - 2 % Final    ABS. NEUTROPHILS 07/05/2017 1.4* 1.8 - 8.0 K/UL Final    ABS. LYMPHOCYTES 07/05/2017 1.4  0.9 - 3.6 K/UL Final    ABS. MONOCYTES 07/05/2017 0.2  0.05 - 1.2 K/UL Final    ABS. EOSINOPHILS 07/05/2017 0.0  0.0 - 0.4 K/UL Final    ABS.  BASOPHILS 07/05/2017 0.0  0.0 - 0.06 K/UL Final    DF 07/05/2017 AUTOMATED    Final    T4, Free 07/05/2017 1.2  0.7 - 1.5 NG/DL Final    LIPID PROFILE 07/05/2017        Final    Cholesterol, total 07/05/2017 197  <200 MG/DL Final    Triglyceride 07/05/2017 107  <150 MG/DL Final    HDL Cholesterol 07/05/2017 48  40 - 60 MG/DL Final    LDL, calculated 07/05/2017 127.6* 0 - 100 MG/DL Final    VLDL, calculated 07/05/2017 21.4  MG/DL Final    CHOL/HDL Ratio 07/05/2017 4.1  0 - 5.0   Final    Sodium 07/05/2017 140  136 - 145 mmol/L Final    Potassium 07/05/2017 4.3  3.5 - 5.5 mmol/L Final    Chloride 07/05/2017 106  100 - 108 mmol/L Final    CO2 07/05/2017 27  21 - 32 mmol/L Final    Anion gap 07/05/2017 7  3.0 - 18 mmol/L Final    Glucose 07/05/2017 122* 74 - 99 mg/dL Final    BUN 07/05/2017 16  7.0 - 18 MG/DL Final    Creatinine 07/05/2017 1.05  0.6 - 1.3 MG/DL Final    BUN/Creatinine ratio 07/05/2017 15  12 - 20   Final    GFR est AA 07/05/2017 >60  >60 ml/min/1.73m2 Final    GFR est non-AA 07/05/2017 >60  >60 ml/min/1.73m2 Final    Calcium 07/05/2017 8.9  8.5 - 10.1 MG/DL Final    Bilirubin, total 07/05/2017 0.4  0.2 - 1.0 MG/DL Final    ALT (SGPT) 07/05/2017 55  16 - 61 U/L Final    AST (SGOT) 07/05/2017 29  15 - 37 U/L Final    Alk.  phosphatase 07/05/2017 62  45 - 117 U/L Final    Protein, total 07/05/2017 6.9  6.4 - 8.2 g/dL Final    Albumin 07/05/2017 3.8  3.4 - 5.0 g/dL Final    Globulin 07/05/2017 3.1  2.0 - 4.0 g/dL Final    A-G Ratio 07/05/2017 1.2  0.8 - 1.7   Final    Prostate Specific Ag 07/05/2017 1.2  0.0 - 4.0 ng/mL Final    TSH 07/05/2017 1.41  0.36 - 3.74 uIU/mL Final    Color 07/05/2017 YELLOW    Final    Appearance 07/05/2017 CLEAR    Final    Specific gravity 07/05/2017 1.021  1.005 - 1.030   Final    pH (UA) 07/05/2017 5.0  5.0 - 8.0   Final    Protein 07/05/2017 NEGATIVE   NEG mg/dL Final    Glucose 07/05/2017 NEGATIVE   NEG mg/dL Final    Ketone 07/05/2017 NEGATIVE   NEG mg/dL Final    Bilirubin 07/05/2017 NEGATIVE   NEG   Final    Blood 07/05/2017 NEGATIVE   NEG   Final    Urobilinogen 07/05/2017 0.2  0.2 - 1.0 EU/dL Final    Nitrites 07/05/2017 NEGATIVE   NEG   Final    Leukocyte Esterase 07/05/2017 NEGATIVE   NEG   Final    WBC 07/05/2017 0 to 1  0 - 4 /hpf Final    RBC 07/05/2017 0  0 - 5 /hpf Final    Epithelial cells 07/05/2017 FEW  0 - 5 /lpf Final    Bacteria 07/05/2017 NEGATIVE   NEG /hpf Final    Vitamin D 25-Hydroxy 07/05/2017 32.4  30 - 100 ng/mL Final    Hemoglobin A1c 07/05/2017 6.9* 4.2 - 5.6 % Final    Est. average glucose 07/05/2017 151  mg/dL Final    Microalbumin,urine random 07/05/2017 0.90  0 - 3.0 MG/DL Final    Creatinine, urine 07/05/2017 158.59* 30 - 125 mg/dL Final    Microalbumin/Creat ratio (mg/g cre* 07/05/2017 6  0 - 30 mg/g Final   Admission on 06/30/2017, Discharged on 06/30/2017   Component Date Value Ref Range Status    Glucose (POC) 06/30/2017 126* 70 - 110 mg/dL Final    Glucose (POC) 06/30/2017 104  70 - 110 mg/dL Final   Hospital Outpatient Visit on 06/29/2017   Component Date Value Ref Range Status    WBC 06/29/2017 3.3* 4.6 - 13.2 K/uL Final    RBC 06/29/2017 5.26  4.70 - 5.50 M/uL Final    HGB 06/29/2017 14.4  13.0 - 16.0 g/dL Final    HCT 06/29/2017 43.3  36.0 - 48.0 % Final    MCV 06/29/2017 82.3  74.0 - 97.0 FL Final    MCH 06/29/2017 27.4  24.0 - 34.0 PG Final    MCHC 06/29/2017 33.3  31.0 - 37.0 g/dL Final    RDW 06/29/2017 12.6  11.6 - 14.5 % Final    PLATELET 64/90/3840 308  135 - 420 K/uL Final    MPV 06/29/2017 10.3  9.2 - 11.8 FL Final    NEUTROPHILS 06/29/2017 44  40 - 73 % Final    LYMPHOCYTES 06/29/2017 46  21 - 52 % Final    MONOCYTES 06/29/2017 8  3 - 10 % Final    EOSINOPHILS 06/29/2017 1  0 - 5 % Final    BASOPHILS 06/29/2017 1  0 - 2 % Final    ABS. NEUTROPHILS 06/29/2017 1.4* 1.8 - 8.0 K/UL Final    ABS. LYMPHOCYTES 06/29/2017 1.5  0.9 - 3.6 K/UL Final    ABS. MONOCYTES 06/29/2017 0.3  0.05 - 1.2 K/UL Final    ABS. EOSINOPHILS 06/29/2017 0.0  0.0 - 0.4 K/UL Final    ABS.  BASOPHILS 06/29/2017 0.0  0.0 - 0.06 K/UL Final    DF 06/29/2017 AUTOMATED    Final    Sodium 06/29/2017 143  136 - 145 mmol/L Final    Potassium 06/29/2017 4.5  3.5 - 5.5 mmol/L Final    Chloride 06/29/2017 108  100 - 108 mmol/L Final    CO2 06/29/2017 28  21 - 32 mmol/L Final    Anion gap 06/29/2017 7  3.0 - 18 mmol/L Final    Glucose 06/29/2017 119* 74 - 99 mg/dL Final    BUN 06/29/2017 13  7.0 - 18 MG/DL Final    Creatinine 06/29/2017 0.95  0.6 - 1.3 MG/DL Final    BUN/Creatinine ratio 06/29/2017 14  12 - 20   Final    GFR est AA 06/29/2017 >60  >60 ml/min/1.73m2 Final    GFR est non-AA 06/29/2017 >60  >60 ml/min/1.73m2 Final    Calcium 06/29/2017 8.9  8.5 - 10.1 MG/DL Final    Bilirubin, total 06/29/2017 0.6  0.2 - 1.0 MG/DL Final    ALT (SGPT) 06/29/2017 26  16 - 61 U/L Final    AST (SGOT) 06/29/2017 14* 15 - 37 U/L Final    Alk. phosphatase 06/29/2017 62  45 - 117 U/L Final    Protein, total 06/29/2017 6.9  6.4 - 8.2 g/dL Final    Albumin 06/29/2017 3.8  3.4 - 5.0 g/dL Final    Globulin 06/29/2017 3.1  2.0 - 4.0 g/dL Final    A-G Ratio 06/29/2017 1.2  0.8 - 1.7   Final    Ventricular Rate 06/29/2017 63  BPM Final    Atrial Rate 06/29/2017 63  BPM Final    P-R Interval 06/29/2017 194  ms Final    QRS Duration 06/29/2017 84  ms Final    Q-T Interval 06/29/2017 362  ms Final    QTC Calculation (Bezet) 06/29/2017 370  ms Final    Calculated P Axis 06/29/2017 59  degrees Final    Calculated R Axis 06/29/2017 65  degrees Final    Calculated T Axis 06/29/2017 61  degrees Final    Diagnosis 06/29/2017    Final                    Value:Normal sinus rhythm  Normal ECG  No previous ECGs available  Confirmed by Jean Claude Jones ([id number]) on 6/29/2017 4:50:10 PM       Labs 12/28/2016:  Creatinine 1.01/ eGFR 91     Glucose 120     TSH 0.78/ free T4 1.2     HbA1c 6.3    Health Maintenance:  Screening:    Colorectal: colonoscopy (3/2017) tubular adenoma. Dr. Slim Vasquez. Due 2022.    Depression: none   DM (HbA1c/FPG): HbA1c 6.9 (7/2017)   Hepatitis C: negative (2/2016)   Falls: none   DEXA: N/A   PSA/ANU: PSA 1.2/ ANU (7/2017)   Glaucoma: regular eye exams at [facility] (last 1/2017)   Smoking: distant past   Vitamin D: 32.4 (7/2017)   Medicare Wellness: N/A    Impression:  Patient Active Problem List   Diagnosis Code    Hyperlipidemia E78.5    Family history of prostate cancer Z80.42    Colon polyps K63.5    Gastric ulcer K25.9    Deep vein thrombosis (DVT); left popliteal, gastrocnemius, soleus veins 9/2012 I82.432    Diabetes mellitus ([facility] E11.9    Essential hypertension I10    Mass of right upper extremity R22.31    GERD (gastroesophageal reflux disease) K21.9    Vitamin D insufficiency E55.9       Plan:  1. Hypertension. Well controlled at home and in office without medication. Renal function normal with creatinine 1.05/ eGFR >60. If antihypertensive is needed, will consider use of diuretic or calcium channel blocker as first line given JNC 8 recommendations to not use Ace-I or ARB as first line in black patients unless evidence of chronic kidney disease exists given increased stroke and cardiovascular risk. However, evidence of diabetes mellitus may suggest that benefit of Ace-I/ ARB therapy would outweigh risk. Continue to follow. 2. Hyperlipidemia. On low intensity dose simvastatin with , indicative of poor control. Discussed increasing dose of simvastatin with patient. He stated he would prefer to attempt a trial of lifestyle modifications, especially given 13 pound weight gain since last visit. Patient states that he has retired and is now less active. Will recheck next visit and adjust dose accordingly. Goal LDL would be < 70 in this patient with diabetes mellitus. Emphasized importance of lifestyle modifications, including diet, exercise, and weight loss. Referral to nutrition counseling to help with lifestyle changes. Recheck lipid panel in 3 months. 3. Diabetes mellitus. Increase in HbA1c to 6.9 since last visit. Thirteen pound weight gain over last six months since retiring. Stressed importance of dietary changes and weight loss. Discussed that if HbA1c remains >6.5 at next visit, will need to start therapy with metformin. Referral to diabetes education for assistance. No current evidence of microvascular complications. Urine microalbumin/ creatinine ratio without evidence of microalbuminuria, and foot exam normal today. On statin, but not on Ace-I or ARB as discussed above. Continue follow-up for annual eye exams. Emphasized importance of lifestyle modifications, including diet, exercise, and weight loss. Recheck HbA1c in 3 months. 4. Right upper arm mass, s/p excisional biopsy. Pathology consistent with epidermal inclusion cyst. No further intervention needed. 5. H/O provoked DVT. No evidence of recurrence since 2012. Continue to follow. 6. GERD. Symptomatic control with over the counter antacids. Follow. 7. Health maintenance. Will give pneumovax today. Other immunizations up to date. Colonoscopy due 2022. Prostate cancer screening up to date. Continue annual eye exams. Vitamin D level normal. Continue maintenance dose supplement. Patient understands recommendations and agrees with plan. Follow-up in 3 months.

## 2017-10-05 ENCOUNTER — HOSPITAL ENCOUNTER (OUTPATIENT)
Dept: LAB | Age: 67
Discharge: HOME | End: 2017-10-05

## 2017-10-05 PROCEDURE — 99001 SPECIMEN HANDLING PT-LAB: CPT | Performed by: INTERNAL MEDICINE

## 2017-10-06 LAB
BUN SERPL-MCNC: 15 MG/DL (ref 8–27)
BUN/CREAT SERPL: 14 (ref 10–24)
CALCIUM SERPL-MCNC: 9.5 MG/DL (ref 8.6–10.2)
CHLORIDE SERPL-SCNC: 106 MMOL/L (ref 96–106)
CHOLEST SERPL-MCNC: 188 MG/DL (ref 100–199)
CO2 SERPL-SCNC: 26 MMOL/L (ref 18–29)
CREAT SERPL-MCNC: 1.05 MG/DL (ref 0.76–1.27)
EST. AVERAGE GLUCOSE BLD GHB EST-MCNC: 151 MG/DL
GLUCOSE SERPL-MCNC: 123 MG/DL (ref 65–99)
HBA1C MFR BLD: 6.9 % (ref 4.8–5.6)
HDLC SERPL-MCNC: 38 MG/DL
INTERPRETATION, 910389: NORMAL
LDLC SERPL CALC-MCNC: 132 MG/DL (ref 0–99)
POTASSIUM SERPL-SCNC: 4.9 MMOL/L (ref 3.5–5.2)
SODIUM SERPL-SCNC: 145 MMOL/L (ref 134–144)
TRIGL SERPL-MCNC: 89 MG/DL (ref 0–149)
VLDLC SERPL CALC-MCNC: 18 MG/DL (ref 5–40)

## 2017-10-12 ENCOUNTER — OFFICE VISIT (OUTPATIENT)
Dept: INTERNAL MEDICINE CLINIC | Age: 67
End: 2017-10-12

## 2017-10-12 VITALS
SYSTOLIC BLOOD PRESSURE: 136 MMHG | HEIGHT: 74 IN | RESPIRATION RATE: 14 BRPM | TEMPERATURE: 98.4 F | OXYGEN SATURATION: 98 % | HEART RATE: 67 BPM | BODY MASS INDEX: 26.82 KG/M2 | WEIGHT: 209 LBS | DIASTOLIC BLOOD PRESSURE: 88 MMHG

## 2017-10-12 DIAGNOSIS — Z80.42 FAMILY HISTORY OF PROSTATE CANCER: ICD-10-CM

## 2017-10-12 DIAGNOSIS — Z00.00 WELCOME TO MEDICARE PREVENTIVE VISIT: ICD-10-CM

## 2017-10-12 DIAGNOSIS — Z71.89 ADVANCE DIRECTIVE DISCUSSED WITH PATIENT: ICD-10-CM

## 2017-10-12 DIAGNOSIS — K21.9 GASTROESOPHAGEAL REFLUX DISEASE, ESOPHAGITIS PRESENCE NOT SPECIFIED: ICD-10-CM

## 2017-10-12 DIAGNOSIS — Z13.6 ENCOUNTER FOR SCREENING FOR CARDIOVASCULAR DISORDERS: ICD-10-CM

## 2017-10-12 DIAGNOSIS — E78.5 HYPERLIPIDEMIA, UNSPECIFIED HYPERLIPIDEMIA TYPE: ICD-10-CM

## 2017-10-12 DIAGNOSIS — I10 ESSENTIAL HYPERTENSION: ICD-10-CM

## 2017-10-12 DIAGNOSIS — E11.9 TYPE 2 DIABETES MELLITUS WITHOUT COMPLICATION, WITHOUT LONG-TERM CURRENT USE OF INSULIN (HCC): Primary | ICD-10-CM

## 2017-10-12 DIAGNOSIS — I82.432 DEEP VEIN THROMBOSIS (DVT) OF POPLITEAL VEIN OF LEFT LOWER EXTREMITY, UNSPECIFIED CHRONICITY (HCC): ICD-10-CM

## 2017-10-12 RX ORDER — METFORMIN HYDROCHLORIDE 500 MG/1
500 TABLET, EXTENDED RELEASE ORAL
Qty: 30 TAB | Refills: 1 | Status: SHIPPED | OUTPATIENT
Start: 2017-10-12 | End: 2018-02-22 | Stop reason: ALTCHOICE

## 2017-10-12 RX ORDER — SIMVASTATIN 20 MG/1
20 TABLET, FILM COATED ORAL
Qty: 90 TAB | Refills: 2 | Status: SHIPPED | OUTPATIENT
Start: 2017-10-12 | End: 2018-02-22 | Stop reason: ALTCHOICE

## 2017-10-12 NOTE — MR AVS SNAPSHOT
Visit Information Date & Time Provider Department Dept. Phone Encounter #  
 10/12/2017  8:30 AM Taj Spring MD [facility] [phone] [id number] Follow-up Instructions Return in about 3 months (around 1/12/2018), or if symptoms worsen or fail to improve. Upcoming Health Maintenance Date Due  
 EYE EXAM RETINAL OR DILATED Q1 9/28/2017 HEMOGLOBIN A1C Q6M 4/5/2018 MICROALBUMIN Q1 7/5/2018 Pneumococcal 65+ Low/Medium Risk (1 of 2 - PCV13) 7/12/2018 FOOT EXAM Q1 7/12/2018 GLAUCOMA SCREENING Q2Y 9/28/2018 LIPID PANEL Q1 10/5/2018 MEDICARE YEARLY EXAM 10/13/2018 COLONOSCOPY 3/20/2022 DTaP/Tdap/Td series (2 - Td) 1/4/2027 Allergies as of 10/12/2017  Review Complete On: 10/12/2017 By: Akin Forman No Known Allergies Current Immunizations  Reviewed on 7/12/2017 Name Date Pneumococcal Polysaccharide (PPSV-23) 7/12/2017  9:20 AM  
 TD Vaccine 9/7/2007 Tdap 1/4/2017 Zoster Vaccine, Live 7/30/2013 Not reviewed this visit You Were Diagnosed With   
  
 Codes Comments Welcome to Medicare preventive visit    -  Primary ICD-10-CM: Z00.00 ICD-9-CM: V70.0 Encounter for screening for cardiovascular disorders     ICD-10-CM: Z13.6 ICD-9-CM: V81.2 Vitals BP Pulse Temp Resp Height(growth percentile) Weight(growth percentile) 136/88 (BP 1 Location: Left arm, BP Patient Position: Sitting) 67 98.4 °F (36.9 °C) (Oral) 14 6' 2\" (1.88 m) 209 lb (94.8 kg) SpO2 BMI Smoking Status 98% 26.83 kg/m2 Former Smoker Vitals History BMI and BSA Data Body Mass Index Body Surface Area  
 26.83 kg/m 2 2.22 m 2 Preferred Pharmacy Pharmacy Name Phone [facility], [address] [facility] [address] [phone] Your Updated Medication List  
  
   
This list is accurate as of: 10/12/17 10:11 AM.  Always use your most recent med list.  
  
  
  
  
 cholecalciferol 1,000 unit Cap Commonly known as:  VITAMIN D3 Take 1,000 Units by mouth daily. simvastatin 20 mg tablet Commonly known as:  ZOCOR Take 1 Tab by mouth nightly. Prescriptions Sent to Pharmacy Refills  
 simvastatin (ZOCOR) 20 mg tablet 2 Sig: Take 1 Tab by mouth nightly. Class: Normal  
 Pharmacy: [address], [address] Ph #: [phone] Route: Oral  
  
We Performed the Following AMB POC EKG ROUTINE W/ 12 LEADS, INTER & REP [70081 CPT(R)] Follow-up Instructions Return in about 3 months (around 1/12/2018), or if symptoms worsen or fail to improve. To-Do List   
 10/12/2017 Imaging:  US EXAM SCREENING AAA Patient Instructions Learning About Diabetes Food Guidelines Your Care Instructions Meal planning is important to manage diabetes. It helps keep your blood sugar at a target level (which you set with your doctor). You don't have to eat special foods. You can eat what your family eats, including sweets once in a while. But you do have to pay attention to how often you eat and how much you eat of certain foods. You may want to work with a dietitian or a certified diabetes educator (CDE) to help you plan meals and snacks. A dietitian or CDE can also help you lose weight if that is one of your goals. What should you know about eating carbs? Managing the amount of carbohydrate (carbs) you eat is an important part of healthy meals when you have diabetes. Carbohydrate is found in many foods. · Learn which foods have carbs. And learn the amounts of carbs in different foods. ¨ Bread, cereal, pasta, and rice have about 15 grams of carbs in a serving. A serving is 1 slice of bread (1 ounce), ½ cup of cooked cereal, or 1/3 cup of cooked pasta or rice. ¨ Fruits have 15 grams of carbs in a serving.  A serving is 1 small fresh fruit, such as an apple or orange; ½ of a banana; ½ cup of cooked or canned fruit; ½ cup of fruit juice; 1 cup of melon or raspberries; or 2 tablespoons of dried fruit. ¨ Milk and no-sugar-added yogurt have 15 grams of carbs in a serving. A serving is 1 cup of milk or 2/3 cup of no-sugar-added yogurt. ¨ Starchy vegetables have 15 grams of carbs in a serving. A serving is ½ cup of mashed potatoes or sweet potato; 1 cup winter squash; ½ of a small baked potato; ½ cup of cooked beans; or ½ cup cooked corn or green peas. · Learn how much carbs to eat each day and at each meal. A dietitian or CDE can teach you how to keep track of the amount of carbs you eat. This is called carbohydrate counting. · If you are not sure how to count carbohydrate grams, use the Plate Method to plan meals. It is a good, quick way to make sure that you have a balanced meal. It also helps you spread carbs throughout the day. ¨ Divide your plate by types of foods. Put non-starchy vegetables on half the plate, meat or other protein food on one-quarter of the plate, and a grain or starchy vegetable in the final quarter of the plate. To this you can add a small piece of fruit and 1 cup of milk or yogurt, depending on how many carbs you are supposed to eat at a meal. 
· Try to eat about the same amount of carbs at each meal. Do not \"save up\" your daily allowance of carbs to eat at one meal. 
· Proteins have very little or no carbs per serving. Examples of proteins are beef, chicken, turkey, fish, eggs, tofu, cheese, cottage cheese, and peanut butter. A serving size of meat is 3 ounces, which is about the size of a deck of cards. Examples of meat substitute serving sizes (equal to 1 ounce of meat) are 1/4 cup of cottage cheese, 1 egg, 1 tablespoon of peanut butter, and ½ cup of tofu. How can you eat out and still eat healthy? · Learn to estimate the serving sizes of foods that have carbohydrate.  If you measure food at home, it will be easier to estimate the amount in a serving of restaurant food. · If the meal you order has too much carbohydrate (such as potatoes, corn, or baked beans), ask to have a low-carbohydrate food instead. Ask for a salad or green vegetables. · If you use insulin, check your blood sugar before and after eating out to help you plan how much to eat in the future. · If you eat more carbohydrate at a meal than you had planned, take a walk or do other exercise. This will help lower your blood sugar. What else should you know? · Limit saturated fat, such as the fat from meat and dairy products. This is a healthy choice because people who have diabetes are at higher risk of heart disease. So choose lean cuts of meat and nonfat or low-fat dairy products. Use olive or canola oil instead of butter or shortening when cooking. · Don't skip meals. Your blood sugar may drop too low if you skip meals and take insulin or certain medicines for diabetes. · Check with your doctor before you drink alcohol. Alcohol can cause your blood sugar to drop too low. Alcohol can also cause a bad reaction if you take certain diabetes medicines. Follow-up care is a key part of your treatment and safety. Be sure to make and go to all appointments, and call your doctor if you are having problems. It's also a good idea to know your test results and keep a list of the medicines you take. Where can you learn more? Go to http://yanet-sergio.info/. Enter C312 in the search box to learn more about \"Learning About Diabetes Food Guidelines. \" Current as of: March 13, 2017 Content Version: 11.3 © 6329-6543 Healthwise, Incorporated. Care instructions adapted under license by [employer] (which disclaims liability or warranty for this information).  If you have questions about a medical condition or this instruction, always ask your healthcare professional. [employer] disclaims any warranty or liability for your use of this information. Learning About Meal Planning for Diabetes Why plan your meals? Meal planning can be a key part of managing diabetes. Planning meals and snacks with the right balance of carbohydrate, protein, and fat can help you keep your blood sugar at the target level you set with your doctor. You don't have to eat special foods. You can eat what your family eats, including sweets once in a while. But you do have to pay attention to how often you eat and how much you eat of certain foods. You may want to work with a dietitian or a certified diabetes educator. He or she can give you tips and meal ideas and can answer your questions about meal planning. This health professional can also help you reach a healthy weight if that is one of your goals. What plan is right for you? Your dietitian or diabetes educator may suggest that you start with the plate format or carbohydrate counting. The plate format The plate format is a simple way to help you manage how you eat. You plan meals by learning how much space each food should take on a plate. Using the plate format helps you spread carbohydrate throughout the day. It can make it easier to keep your blood sugar level within your target range. It also helps you see if you're eating healthy portion sizes. To use the plate format, you put non-starchy vegetables on half your plate. Add meat or meat substitutes on one-quarter of the plate. Put a grain or starchy vegetable (such as brown rice or a potato) on the final quarter of the plate. You can add a small piece of fruit and some low-fat or fat-free milk or yogurt, depending on your carbohydrate goal for each meal. 
Here are some tips for using the plate format: · Make sure that you are not using an oversized plate. A 9-inch plate is best. Many restaurants use larger plates. · Get used to using the plate format at home. Then you can use it when you eat out. · Write down your questions about using the plate format. Talk to your doctor, a dietitian, or a diabetes educator about your concerns. Carbohydrate counting With carbohydrate counting, you plan meals based on the amount of carbohydrate in each food. Carbohydrate raises blood sugar higher and more quickly than any other nutrient. It is found in desserts, breads and cereals, and fruit. It's also found in starchy vegetables such as potatoes and corn, grains such as rice and pasta, and milk and yogurt. Spreading carbohydrate throughout the day helps keep your blood sugar levels within your target range. Your daily amount depends on several things, including your weight, how active you are, which diabetes medicines you take, and what your goals are for your blood sugar levels. A registered dietitian or diabetes educator can help you plan how much carbohydrate to include in each meal and snack. A guideline for your daily amount of carbohydrate is: · 45 to 60 grams at each meal. That's about the same as 3 to 4 carbohydrate servings. · 15 to 20 grams at each snack. That's about the same as 1 carbohydrate serving. The Nutrition Facts label on packaged foods tells you how much carbohydrate is in a serving of the food. First, look at the serving size on the food label. Is that the amount you eat in a serving? All of the nutrition information on a food label is based on that serving size. So if you eat more or less than that, you'll need to adjust the other numbers. Total carbohydrate is the next thing you need to look for on the label. If you count carbohydrate servings, one serving of carbohydrate is 15 grams. For foods that don't come with labels, such as fresh fruits and vegetables, you'll need a guide that lists carbohydrate in these foods.  Ask your doctor, dietitian, or diabetes educator about books or other nutrition guides you can use. If you take insulin, you need to know how many grams of carbohydrate are in a meal. This lets you know how much rapid-acting insulin to take before you eat. If you use an insulin pump, you get a constant rate of insulin during the day. So the pump must be programmed at meals to give you extra insulin to cover the rise in blood sugar after meals. When you know how much carbohydrate you will eat, you can take the right amount of insulin. Or, if you always use the same amount of insulin, you need to make sure that you eat the same amount of carbohydrate at meals. If you need more help to understand carbohydrate counting and food labels, ask your doctor, dietitian, or diabetes educator. How do you get started with meal planning? Here are some tips to get started: 
· Plan your meals a week at a time. Don't forget to include snacks too. · Use cookbooks or online recipes to plan several main meals. Plan some quick meals for busy nights. You also can double some recipes that freeze well. Then you can save half for other busy nights when you don't have time to cook. · Make sure you have the ingredients you need for your recipes. If you're running low on basic items, put these items on your shopping list too. · List foods that you use to make breakfasts, lunches, and snacks. List plenty of fruits and vegetables. · Post this list on the refrigerator. Add to it as you think of more things you need. · Take the list to the store to do your weekly shopping. Follow-up care is a key part of your treatment and safety. Be sure to make and go to all appointments, and call your doctor if you are having problems. It's also a good idea to know your test results and keep a list of the medicines you take. Where can you learn more? Go to http://yanet-sergio.info/. Casey Cordero in the search box to learn more about \"Learning About Meal Planning for Diabetes. \" 
 Current as of: March 13, 2017 Content Version: 11.3 © 0447-2808 [employer]progress. Care instructions adapted under license by [employer] (which disclaims liability or warranty for this information). If you have questions about a medical condition or this instruction, always ask your healthcare professional. [address] any warranty or liability for your use of this information. Medicare Wellness Visit, Male The best way to improve and maintain good health is to have a healthy lifestyle by eating a well-balanced diet, exercising regularly, limiting alcohol and stopping smoking. Regular visits with your physician or non-physician health care provider also support your good health. Preventive screening tests can find health problems before they become diseases or illnesses. Preventive services such as immunizations prevent serious infections. All people over age 72 should have a Pneumovax and a Prevnar-13 shot to prevent potentially life threatening infections with the pneumococcus bacteria, a common cause of pneumonia. These are once in a lifetime unless you and your provider decide differently. All people over 65 should have a yearly influenza vaccine or \"flu\" shot. This does not prevent infection with cold viruses but has been proven to prevent hospitalization and death from influenza. Although Medicare part B \"regular Medicare\" currently only covers tetanus vaccination in the context of an injury, a tetanus vaccine (Tdap or Td) is recommended every 10 years. A shingles vaccine is recommended once in a lifetime after age 61. The Shingles vaccine is also not covered by Medicare part B. Note, however, that both the Shingles vaccine and Tdap/Td are generally covered by secondary carriers. Please check your coverage and out of pocket expenses. Consider contacting your local health department because it may stock these vaccines for a reasonable charge. We currently have documentation of the following immunization history for you: 
Immunization History Administered Date(s) Administered  Pneumococcal Polysaccharide (PPSV-23) 07/12/2017  TD Vaccine 09/07/2007  Tdap 01/04/2017  Zoster Vaccine, Live 07/30/2013 Screening for infection with Hepatitis C is recommended for anyone born between [birth year] through [address]. The table at the bottom of this document indicates the status of this and other screening services. Screening for diabetes mellitus with a blood sugar test (glucose) should be done at least every 3 years until age 79. You and your health care provider may decide whether to continue screening after age 79. The most recent blood glucose we have on file for you is:  
Lab Results Component Value Date/Time Glucose 123 10/05/2017 12:00 AM  
 Glucose (POC) 104 06/30/2017 09:10 AM  
 
 
Glaucoma is a disease of the eye due to increased ocular pressure that can lead to blindness. People with risk factors for glaucoma ( race, diabetes, family history) should be screened at least every 2 years by an eye professional. This may be covered annually if indicated as determined by you and your doctor. Cardiovascular screening tests that check for elevated lipids or cholesterol (fatty part of blood) which can lead to heart disease and strokes should be done every 4-6 years through age 79. You and your health care provider may decide whether to continue screening after age 79. The most recent lipid panel we have on file for you is:  
Lab Results Component Value Date/Time  Cholesterol, total 188 10/05/2017 12:00 AM  
 HDL Cholesterol 38 10/05/2017 12:00 AM  
 LDL, calculated 132 10/05/2017 12:00 AM  
 VLDL, calculated 18 10/05/2017 12:00 AM  
 Triglyceride 89 10/05/2017 12:00 AM  
 CHOL/HDL Ratio 4.1 07/05/2017 08:30 AM  
 
 
Colorectal cancer screening that evaluates for blood or polyps in your colon for people with average risk should be done yearly as a stool test, every five years as a flexible sigmoidoscope or every 10 years as a colonoscopy up to age 76. You and your health care provider may decide whether to continue screening after age 76. Men up to age 76 may elect to screen for prostate cancer with a blood test called a PSA at certain intervals, depending on their personal and family history. This decision is between the patient and his provider. The most recent PSA values we have on file for you are: 
Lab Results Component Value Date/Time  
 Prostate Specific Ag 1.2 07/05/2017 08:30 AM  
 Prostate Specific Ag 1.0 02/10/2016 09:09 AM  
 Prostate Specific Ag 0.939 08/04/2014 08:40 AM  
 Prostate Specific Ag 0.5 07/05/2010 09:15 AM  
 
 
If you have been a smoker or had family history of abdominal aortic aneurysms, you and your provider may decide to schedule an ultrasound test of your aorta. Our records show this was done on: To schedule People who have smoked the equivalent of 1 pack per day for 30 years or more may benefit from screening for lung cancer with a yearly low dose CT scan until they have been non smokers for 15 years or competing health conditions render this unlikely to be beneficial. Our records show: N/A Your Medicare Wellness Exam is recommended annually. Here is a list of your current Health Maintenance items with a due date: 
Health Maintenance Topic Date Due  
 EYE EXAM RETINAL OR DILATED Q1  09/28/2017  
 HEMOGLOBIN A1C Q6M  04/05/2018  MICROALBUMIN Q1  07/05/2018  Pneumococcal 65+ Low/Medium Risk (1 of 2 - PCV13) 07/12/2018  
 FOOT EXAM Q1  07/12/2018  GLAUCOMA SCREENING Q2Y  09/28/2018  LIPID PANEL Q1  10/05/2018  MEDICARE YEARLY EXAM  10/13/2018  COLONOSCOPY  03/20/2022  DTaP/Tdap/Td series (2 - Td) 01/04/2027  Hepatitis C Screening  Completed  ZOSTER VACCINE AGE 60>  Completed  INFLUENZA AGE 9 TO ADULT  Addressed Introducing \[facility]\"" & HEALTH SERVICES! Dear Arias Kerr: Thank you for requesting a [employer] account. Our records indicate that you already have an active [employer] account. You can access your account anytime at [email or web address] Did you know that you can access your hospital and ER discharge instructions at any time in [employer]? You can also review all of your test results from your hospital stay or ER visit. Additional Information If you have questions, please visit the Frequently Asked Questions section of the [employer] website at [email or web address] Remember, [employer] is NOT to be used for urgent needs. For medical emergencies, dial 911. Now available from your iPhone and Android! Please provide this summary of care documentation to your next provider. Your primary care clinician is listed as Chema Jacobsen. If you have any questions after today's visit, please call [phone].

## 2017-10-12 NOTE — ACP (ADVANCE CARE PLANNING)
Advance care planning discussion initiated. Patient has not completed an advance directive previously. He states that he would designate his wife and son as his healthcare agents. He expresses that he does not wish life prolonging procedures for end of life care. Advance directive form and toolkit provided and reviewed with patient.

## 2017-10-12 NOTE — PROGRESS NOTES
Advance Care Planning (ACP) Provider Note - Comprehensive     Date of ACP Conversation: 10/12/17  Persons included in Conversation:  patient  Length of ACP Conversation in minutes:  16 minutes    Authorized Decision Maker (if patient is incapable of making informed decisions): wife (primary), son (secondary)  This person is:  Healthcare Agent/Medical Power of  under Advance Directive          General ACP for ALL Patients with Decision Making Capacity:   Importance of advance care planning, including choosing a healthcare agent to communicate patient's healthcare decisions if patient lost the ability to make decisions, such as after a sudden illness or accident  Understanding of the healthcare agent role was assessed and information provided  Exploration of values, goals, and preferences if recovery is not expected, even with continued medical treatment in the event of: Imminent death  Severe, permanent brain injury  \"In these circumstances, what matters most to you? \"  Care focused more on comfort or quality of life. Review of Existing Advance Directive:  Patient has not completed an advance directive previously. He states that he would designate his wife and son as his healthcare agents. He expresses that he does not wish life prolonging procedures for end of life care. For Serious or Chronic Illness:  Understanding of medical condition      Interventions Provided:  Recommended completion of Advance Directive form after review of ACP materials and conversation with prospective healthcare agent   Recommended communicating the plan and making copies for the healthcare agent, personal physician, and others as appropriate (e.g., health system)  Recommended review of completed ACP document annually or upon change in health status   Recommended to complete advance directive and return completed form to office to be copied and scanned into chart. Paperwork provided and reviewed.

## 2017-10-12 NOTE — PATIENT INSTRUCTIONS
Learning About Diabetes Food Guidelines  Your Care Instructions  Meal planning is important to manage diabetes. It helps keep your blood sugar at a target level (which you set with your doctor). You don't have to eat special foods. You can eat what your family eats, including sweets once in a while. But you do have to pay attention to how often you eat and how much you eat of certain foods. You may want to work with a dietitian or a certified diabetes educator (CDE) to help you plan meals and snacks. A dietitian or CDE can also help you lose weight if that is one of your goals. What should you know about eating carbs? Managing the amount of carbohydrate (carbs) you eat is an important part of healthy meals when you have diabetes. Carbohydrate is found in many foods. · Learn which foods have carbs. And learn the amounts of carbs in different foods. ¨ Bread, cereal, pasta, and rice have about 15 grams of carbs in a serving. A serving is 1 slice of bread (1 ounce), ½ cup of cooked cereal, or 1/3 cup of cooked pasta or rice. ¨ Fruits have 15 grams of carbs in a serving. A serving is 1 small fresh fruit, such as an apple or orange; ½ of a banana; ½ cup of cooked or canned fruit; ½ cup of fruit juice; 1 cup of melon or raspberries; or 2 tablespoons of dried fruit. ¨ Milk and no-sugar-added yogurt have 15 grams of carbs in a serving. A serving is 1 cup of milk or 2/3 cup of no-sugar-added yogurt. ¨ Starchy vegetables have 15 grams of carbs in a serving. A serving is ½ cup of mashed potatoes or sweet potato; 1 cup winter squash; ½ of a small baked potato; ½ cup of cooked beans; or ½ cup cooked corn or green peas. · Learn how much carbs to eat each day and at each meal. A dietitian or CDE can teach you how to keep track of the amount of carbs you eat. This is called carbohydrate counting. · If you are not sure how to count carbohydrate grams, use the Plate Method to plan meals.  It is a good, quick way to make sure that you have a balanced meal. It also helps you spread carbs throughout the day. ¨ Divide your plate by types of foods. Put non-starchy vegetables on half the plate, meat or other protein food on one-quarter of the plate, and a grain or starchy vegetable in the final quarter of the plate. To this you can add a small piece of fruit and 1 cup of milk or yogurt, depending on how many carbs you are supposed to eat at a meal.  · Try to eat about the same amount of carbs at each meal. Do not \"save up\" your daily allowance of carbs to eat at one meal.  · Proteins have very little or no carbs per serving. Examples of proteins are beef, chicken, turkey, fish, eggs, tofu, cheese, cottage cheese, and peanut butter. A serving size of meat is 3 ounces, which is about the size of a deck of cards. Examples of meat substitute serving sizes (equal to 1 ounce of meat) are 1/4 cup of cottage cheese, 1 egg, 1 tablespoon of peanut butter, and ½ cup of tofu. How can you eat out and still eat healthy? · Learn to estimate the serving sizes of foods that have carbohydrate. If you measure food at home, it will be easier to estimate the amount in a serving of restaurant food. · If the meal you order has too much carbohydrate (such as potatoes, corn, or baked beans), ask to have a low-carbohydrate food instead. Ask for a salad or green vegetables. · If you use insulin, check your blood sugar before and after eating out to help you plan how much to eat in the future. · If you eat more carbohydrate at a meal than you had planned, take a walk or do other exercise. This will help lower your blood sugar. What else should you know? · Limit saturated fat, such as the fat from meat and dairy products. This is a healthy choice because people who have diabetes are at higher risk of heart disease. So choose lean cuts of meat and nonfat or low-fat dairy products. Use olive or canola oil instead of butter or shortening when cooking.   · Don't skip meals. Your blood sugar may drop too low if you skip meals and take insulin or certain medicines for diabetes. · Check with your doctor before you drink alcohol. Alcohol can cause your blood sugar to drop too low. Alcohol can also cause a bad reaction if you take certain diabetes medicines. Follow-up care is a key part of your treatment and safety. Be sure to make and go to all appointments, and call your doctor if you are having problems. It's also a good idea to know your test results and keep a list of the medicines you take. Where can you learn more? Go to http://yanet-sergio.info/. Enter R677 in the search box to learn more about \"Learning About Diabetes Food Guidelines. \"  Current as of: March 13, 2017  Content Version: 11.3  © 2883-5731 [employer]. Care instructions adapted under license by [employer] (which disclaims liability or warranty for this information). If you have questions about a medical condition or this instruction, always ask your healthcare professional. [address] any warranty or liability for your use of this information. Learning About Meal Planning for Diabetes  Why plan your meals? Meal planning can be a key part of managing diabetes. Planning meals and snacks with the right balance of carbohydrate, protein, and fat can help you keep your blood sugar at the target level you set with your doctor. You don't have to eat special foods. You can eat what your family eats, including sweets once in a while. But you do have to pay attention to how often you eat and how much you eat of certain foods. You may want to work with a dietitian or a certified diabetes educator. He or she can give you tips and meal ideas and can answer your questions about meal planning. This health professional can also help you reach a healthy weight if that is one of your goals. What plan is right for you?   Your dietitian or diabetes educator may suggest that you start with the plate format or carbohydrate counting. The plate format  The plate format is a simple way to help you manage how you eat. You plan meals by learning how much space each food should take on a plate. Using the plate format helps you spread carbohydrate throughout the day. It can make it easier to keep your blood sugar level within your target range. It also helps you see if you're eating healthy portion sizes. To use the plate format, you put non-starchy vegetables on half your plate. Add meat or meat substitutes on one-quarter of the plate. Put a grain or starchy vegetable (such as brown rice or a potato) on the final quarter of the plate. You can add a small piece of fruit and some low-fat or fat-free milk or yogurt, depending on your carbohydrate goal for each meal.  Here are some tips for using the plate format:  · Make sure that you are not using an oversized plate. A 9-inch plate is best. Many restaurants use larger plates. · Get used to using the plate format at home. Then you can use it when you eat out. · Write down your questions about using the plate format. Talk to your doctor, a dietitian, or a diabetes educator about your concerns. Carbohydrate counting  With carbohydrate counting, you plan meals based on the amount of carbohydrate in each food. Carbohydrate raises blood sugar higher and more quickly than any other nutrient. It is found in desserts, breads and cereals, and fruit. It's also found in starchy vegetables such as potatoes and corn, grains such as rice and pasta, and milk and yogurt. Spreading carbohydrate throughout the day helps keep your blood sugar levels within your target range. Your daily amount depends on several things, including your weight, how active you are, which diabetes medicines you take, and what your goals are for your blood sugar levels.  A registered dietitian or diabetes educator can help you plan how much carbohydrate to include in each meal and snack. A guideline for your daily amount of carbohydrate is:  · 45 to 60 grams at each meal. That's about the same as 3 to 4 carbohydrate servings. · 15 to 20 grams at each snack. That's about the same as 1 carbohydrate serving. The Nutrition Facts label on packaged foods tells you how much carbohydrate is in a serving of the food. First, look at the serving size on the food label. Is that the amount you eat in a serving? All of the nutrition information on a food label is based on that serving size. So if you eat more or less than that, you'll need to adjust the other numbers. Total carbohydrate is the next thing you need to look for on the label. If you count carbohydrate servings, one serving of carbohydrate is 15 grams. For foods that don't come with labels, such as fresh fruits and vegetables, you'll need a guide that lists carbohydrate in these foods. Ask your doctor, dietitian, or diabetes educator about books or other nutrition guides you can use. If you take insulin, you need to know how many grams of carbohydrate are in a meal. This lets you know how much rapid-acting insulin to take before you eat. If you use an insulin pump, you get a constant rate of insulin during the day. So the pump must be programmed at meals to give you extra insulin to cover the rise in blood sugar after meals. When you know how much carbohydrate you will eat, you can take the right amount of insulin. Or, if you always use the same amount of insulin, you need to make sure that you eat the same amount of carbohydrate at meals. If you need more help to understand carbohydrate counting and food labels, ask your doctor, dietitian, or diabetes educator. How do you get started with meal planning? Here are some tips to get started:  · Plan your meals a week at a time. Don't forget to include snacks too. · Use cookbooks or online recipes to plan several main meals. Plan some quick meals for busy nights. You also can double some recipes that freeze well. Then you can save half for other busy nights when you don't have time to cook. · Make sure you have the ingredients you need for your recipes. If you're running low on basic items, put these items on your shopping list too. · List foods that you use to make breakfasts, lunches, and snacks. List plenty of fruits and vegetables. · Post this list on the refrigerator. Add to it as you think of more things you need. · Take the list to the store to do your weekly shopping. Follow-up care is a key part of your treatment and safety. Be sure to make and go to all appointments, and call your doctor if you are having problems. It's also a good idea to know your test results and keep a list of the medicines you take. Where can you learn more? Go to http://yanetAbigail Stewartsergio.info/. Stephen Ron in the search box to learn more about \"Learning About Meal Planning for Diabetes. \"  Current as of: March 13, 2017  Content Version: 11.3  © 3178-3135 [employer]. Care instructions adapted under license by [employer] (which disclaims liability or warranty for this information). If you have questions about a medical condition or this instruction, always ask your healthcare professional. [place] [address] any warranty or liability for your use of this information. Medicare Wellness Visit, Male    The best way to improve and maintain good health is to have a healthy lifestyle by eating a well-balanced diet, exercising regularly, limiting alcohol and stopping smoking. Regular visits with your physician or non-physician health care provider also support your good health. Preventive screening tests can find health problems before they become diseases or illnesses. Preventive services such as immunizations prevent serious infections.     All people over age 72 should have a Pneumovax and a Prevnar-13 shot to prevent potentially life threatening infections with the pneumococcus bacteria, a common cause of pneumonia. These are once in a lifetime unless you and your provider decide differently. All people over 65 should have a yearly influenza vaccine or \"flu\" shot. This does not prevent infection with cold viruses but has been proven to prevent hospitalization and death from influenza. Although Medicare part B \"regular Medicare\" currently only covers tetanus vaccination in the context of an injury, a tetanus vaccine (Tdap or Td) is recommended every 10 years. A shingles vaccine is recommended once in a lifetime after age 61. The Shingles vaccine is also not covered by Medicare part B. Note, however, that both the Shingles vaccine and Tdap/Td are generally covered by secondary carriers. Please check your coverage and out of pocket expenses. Consider contacting your local health department because it may stock these vaccines for a reasonable charge. We currently have documentation of the following immunization history for you:  Immunization History   Administered Date(s) Administered    Pneumococcal Polysaccharide (PPSV-23) 07/12/2017    TD Vaccine 09/07/2007    Tdap 01/04/2017    Zoster Vaccine, Live 07/30/2013       Screening for infection with Hepatitis C is recommended for anyone born between [birth year] through [address]. The table at the bottom of this document indicates the status of this and other screening services. Screening for diabetes mellitus with a blood sugar test (glucose) should be done at least every 3 years until age 79. You and your health care provider may decide whether to continue screening after age 79. The most recent blood glucose we have on file for you is:   Lab Results   Component Value Date/Time    Glucose 123 10/05/2017 12:00 AM    Glucose (POC) 104 06/30/2017 09:10 AM       Glaucoma is a disease of the eye due to increased ocular pressure that can lead to blindness.  People with risk factors for glaucoma ( race, diabetes, family history) should be screened at least every 2 years by an eye professional. This may be covered annually if indicated as determined by you and your doctor. Cardiovascular screening tests that check for elevated lipids or cholesterol (fatty part of blood) which can lead to heart disease and strokes should be done every 4-6 years through age 79. You and your health care provider may decide whether to continue screening after age 79. The most recent lipid panel we have on file for you is:   Lab Results   Component Value Date/Time    Cholesterol, total 188 10/05/2017 12:00 AM    HDL Cholesterol 38 10/05/2017 12:00 AM    LDL, calculated 132 10/05/2017 12:00 AM    VLDL, calculated 18 10/05/2017 12:00 AM    Triglyceride 89 10/05/2017 12:00 AM    CHOL/HDL Ratio 4.1 07/05/2017 08:30 AM       Colorectal cancer screening that evaluates for blood or polyps in your colon for people with average risk should be done yearly as a stool test, every five years as a flexible sigmoidoscope or every 10 years as a colonoscopy up to age 76. You and your health care provider may decide whether to continue screening after age 76. Men up to age 76 may elect to screen for prostate cancer with a blood test called a PSA at certain intervals, depending on their personal and family history. This decision is between the patient and his provider. The most recent PSA values we have on file for you are:  Lab Results   Component Value Date/Time    Prostate Specific Ag 1.2 07/05/2017 08:30 AM    Prostate Specific Ag 1.0 02/10/2016 09:09 AM    Prostate Specific Ag 0.939 08/04/2014 08:40 AM    Prostate Specific Ag 0.5 07/05/2010 09:15 AM       If you have been a smoker or had family history of abdominal aortic aneurysms, you and your provider may decide to schedule an ultrasound test of your aorta. Our records show this was done on:  To schedule    People who have smoked the equivalent of 1 pack per day for 30 years or more may benefit from screening for lung cancer with a yearly low dose CT scan until they have been non smokers for 15 years or competing health conditions render this unlikely to be beneficial. Our records show: N/A    Your Medicare Wellness Exam is recommended annually.     Here is a list of your current Health Maintenance items with a due date:  Health Maintenance   Topic Date Due    EYE EXAM RETINAL OR DILATED Q1  09/28/2017    HEMOGLOBIN A1C Q6M  04/05/2018    MICROALBUMIN Q1  07/05/2018    Pneumococcal 65+ Low/Medium Risk (1 of 2 - PCV13) 07/12/2018    FOOT EXAM Q1  07/12/2018    GLAUCOMA SCREENING Q2Y  09/28/2018    LIPID PANEL Q1  10/05/2018    MEDICARE YEARLY EXAM  10/13/2018    COLONOSCOPY  03/20/2022    DTaP/Tdap/Td series (2 - Td) 01/04/2027    Hepatitis C Screening  Completed    ZOSTER VACCINE AGE 60>  Completed    INFLUENZA AGE 9 TO ADULT  Addressed

## 2017-10-12 NOTE — PROGRESS NOTES
This is a \"Welcome to [employer]"  Initial Preventive Physical Examination (IPPE) providing Personalized Prevention Plan Services (Performed in the first 12 months of enrollment)    I have reviewed the patient's medical history in detail and updated the computerized patient record. History     Past Medical History:   Diagnosis Date    Colon polyps     Diabetes mellitus ([address]     FH: prostate cancer     GERD (gastroesophageal reflux disease)     History of DVT (deep vein thrombosis) 09/2012    Provoked following airplane ride home from [place]. Venous duplex scan showed acute nonocclusive DVT in left popliteal vein; acute thrombosis left gastocnemius and soleus vein.  Hypercholesterolemia     Hypertension     Thromboembolus ([address]     leg  years ago      Past Surgical History:   Procedure Laterality Date    HX GI      colonoscopy x 3    SD EXCISION TUMOR SOFT TISSUE SHOULDER SUBQ 3+CM Right 06/30/2017    Dr. Aicha Montez     Current Outpatient Prescriptions   Medication Sig Dispense Refill    simvastatin (ZOCOR) 20 mg tablet Take 1 Tab by mouth nightly. 90 Tab 2    metFORMIN ER (GLUCOPHAGE XR) 500 mg tablet Take 1 Tab by mouth daily (with dinner). 30 Tab 1    cholecalciferol (VITAMIN D3) 1,000 unit cap Take 1,000 Units by mouth daily. No Known Allergies  Family History   Problem Relation Age of Onset    Diabetes Mother     Cancer Father     Stroke Father     Hypertension Father     Diabetes Brother      Social History   Substance Use Topics    Smoking status: Former Smoker     Quit date: 1/1/1999    Smokeless tobacco: Never Used    Alcohol use No     Diet, Lifestyle: The patient is prescribed and follows a special diet.      Exercise level: sedentary    Depression Risk Screen     PHQ over the last two weeks 10/12/2017   Little interest or pleasure in doing things Not at all   Feeling down, depressed or hopeless Not at all   Total Score PHQ 2 0     Alcohol Risk Screen   You do not drink alcohol or very rarely. Functional Ability and Level of Safety   Hearing Loss  Hearing is good. Vision Screening  Vision is good. No exam data present      Activities of Daily Living  The home contains: no safety equipment. Patient does total self care    Fall Risk Screen  Fall Risk Assessment, last 12 mths 10/12/2017   Able to walk? Yes   Fall in past 12 months? No       Abuse Screen  Patient is not abused    Screening EKG   EKG order placed: Yes    Patient Care Team   Patient Care Team:  Lilian Perez MD as PCP - General (Internal Medicine)  Oralia Mack NP as Nurse Practitioner (Nurse Practitioner)     End of Life Planning   Advanced care planning directives were discussed with the patient and /or family/caregiver. Assessment/Plan   Education and counseling provided:  Are appropriate based on today's review and evaluation  End-of-Life planning (with patient's consent)  Pneumococcal Vaccine  Influenza Vaccine  Prostate cancer screening tests (PSA, covered annually)  Colorectal cancer screening tests  Cardiovascular screening blood test  Screening for glaucoma  Diabetes screening test  Diabetes outpatient self-management training services    Diagnoses and all orders for this visit:    1. Type 2 diabetes mellitus without complication, without long-term current use of insulin (HCC)  -     REFERRAL TO DIABETIC EDUCATION  -     HEMOGLOBIN A1C WITH EAG; Future    2. Welcome to Medicare preventive visit  -     AMB POC EKG ROUTINE W/ 12 LEADS, INTER & REP  -     US EXAM SCREENING AAA; Future    3. Encounter for screening for cardiovascular disorders   -     US EXAM SCREENING AAA; Future    4. Essential hypertension  -     METABOLIC PANEL, COMPREHENSIVE; Future    5. Deep vein thrombosis (DVT) of popliteal vein of left lower extremity, unspecified chronicity (HCC)    6. Hyperlipidemia, unspecified hyperlipidemia type  -     LIPID PANEL; Future    7. Family history of prostate cancer    8. Gastroesophageal reflux disease, esophagitis presence not specified    9. Advance directive discussed with patient    Other orders  -     simvastatin (ZOCOR) 20 mg tablet; Take 1 Tab by mouth nightly. -     metFORMIN ER (GLUCOPHAGE XR) 500 mg tablet; Take 1 Tab by mouth daily (with dinner). There are no preventive care reminders to display for this patient.

## 2017-10-12 NOTE — PROGRESS NOTES
Chief Complaint   Patient presents with    Diabetes     3 month follow up with lab results. Health Maintenance Due   Topic Date Due    INFLUENZA AGE 9 TO ADULT  08/01/2017    MEDICARE YEARLY EXAM  08/31/2017    EYE EXAM RETINAL OR DILATED Q1  09/28/2017       1. Have you been to the ER, urgent care clinic or hospitalized since your last visit? NO.     2. Have you seen or consulted any other health care providers outside of the [address] [place] [address] since your last visit (Include any pap smears or colon screening)? NO, but patient states he has an eye appointment at [facility] in [facility] in [place]. Do you have an Advanced Directive? NO    Would you like information on Advanced Directives? NO, Patient states he already has information. Patient states he doesn't want to get his influenza vaccine today.

## 2017-10-15 PROBLEM — Z71.89 ADVANCE DIRECTIVE DISCUSSED WITH PATIENT: Status: ACTIVE | Noted: 2017-10-15

## 2017-10-15 NOTE — PROGRESS NOTES
HPI:   Glory Sweeney is a 74y.o. year old male who presents today for evaluation of prehypertension, hyperlipidemia, diabetes mellitus, prior provoked DVT, and GERD. He reports that he is doing well and is currently without complaints. He has a history of prehypertension, not treated with medication. He states that he continues to monitor his blood pressure at home and reports that it is ranging 129-135/ 70-80. He states that he is no longer walking for exercise, but states that he does do yard work. He denies any chest pain, shortness of breath at rest or with exertion, palpitations, lightheadedness, or edema. He also has a history of hyperlipidemia, treated with low intensity dose simvastatin. He has a history of diabetes mellitus, recently diagnosed in 2/2016 with HbA1c of 6.5. Fasting blood glucose have been elevated since 2010 ranging from 107-128. He denies any polyuria, polydipsia, nocturia, or blurry vision, and has no history of retinopathy, neuropathy, or nephropathy. He has regular eye exams with Dr. Graham Gonsalez of [facility] on [address] [facility]. He has a history of a provoked DVT, occurring in 8/2012 following an airplane ride home from [facility]. Venous duplex scan (9/1/2012) at [place] [address] showed an acute nonocclusive DVT in the left popliteal vein, and an acute thrombus in the left gastrocnemius and soleus veins. He was treated with Lovenox and transitioned to coumadin and completed three months of therapy. 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 He reports occasional mild swelling in his left foot, but denies any calf pain or tenderness, shortness of breath, or pleuritic chest pain. He has had multiple long plane rides since that time without recurrence. He had an excisional biopsy of a mass in his right posterior upper arm on 6/30/2017 by Dr. Sascha Paiz.  Pathology showed an epidermal inclusion cyst.     He has a history of GERD, but uses over the counter antacids occasionally. He also has a reported history of a gastric ulcer, but records are unavailable. He had a screening colonoscopy in 2011 by Dr. Omid Castellon showing mild diverticulosis, a 2 mm sessile polyp in the mid sigmoid colon (fulgurized), and a 5 mm polyp in the proximal sigmoid colon (pathology unavailable). He had another screening colonoscopy in 3/2017 by Dr. Omid Castellon showing a 3 mm sessile descending colon polyp (pathology: tubular adenoma). Follow-up recommended for five years. He denies any abdominal pain, nausea, vomiting, melena, hematochezia, or change in bowel movements. Past Medical History:   Diagnosis Date    Colon polyps     Diabetes mellitus ([address]     FH: prostate cancer     GERD (gastroesophageal reflux disease)     History of DVT (deep vein thrombosis) 2012    Provoked following airplane ride home from [place]. Venous duplex scan showed acute nonocclusive DVT in left popliteal vein; acute thrombosis left gastocnemius and soleus vein.  Hypercholesterolemia     Hypertension     Thromboembolus ([address]     leg  years ago     Past Surgical History:   Procedure Laterality Date    HX GI      colonoscopy x 3    AL EXCISION TUMOR SOFT TISSUE SHOULDER SUBQ 3+CM Right 2017    Dr. Corrie Haynes     Current Outpatient Prescriptions   Medication Sig    simvastatin (ZOCOR) 20 mg tablet Take 1 Tab by mouth nightly.  metFORMIN ER (GLUCOPHAGE XR) 500 mg tablet Take 1 Tab by mouth daily (with dinner).  cholecalciferol (VITAMIN D3) 1,000 unit cap Take 1,000 Units by mouth daily. No current facility-administered medications for this visit. Allergies and Intolerances:   No Known Allergies     Family History: His mother is alive and has DM. His father  of a stroke at age 80. He also had prostate cancer. His brother has prostate cancer metastatic to bone.    Family History   Problem Relation Age of Onset    Diabetes Mother     Cancer Father     Stroke Father    [facility] Hypertension Father     Diabetes Brother      Social History:   He  reports that he quit smoking about 18 years ago. He has never used smokeless tobacco. He smoked 0.5 ppd for 20 years, stopping in . He is  and has one living son, and one son who is . 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 History   Alcohol Use No     Immunization History:  Immunization History   Administered Date(s) Administered    Pneumococcal Polysaccharide (PPSV-23) 2017    TD Vaccine 2007    Tdap 2017    Zoster Vaccine, Live 2013       Review of Systems:   As above included in HPI. Otherwise 11 point review of systems negative including constitutional, skin, HENT, eyes, respiratory, cardiovascular, gastrointestinal, genitourinary, musculoskeletal, endocrine, hematologic, allergy, and neurologic. Physical:   Vitals:   BP: 136/88; repeat 138/80 left arm  HR: 67  WT: 209 lb (94.8 kg)  BMI:  26.83 kg/m2    Exam:   Pt appears well; alert and oriented x 3; appropriate affect. HEENT: PERRLA, anicteric, oropharynx clear, no JVD, adenopathy or thyromegaly. No carotid bruits or radiated murmur. Lungs: clear to auscultation, no wheezes, rhonchi, or rales. Heart: regular rate and rhythm. No murmur, rubs, gallops  Abdomen: soft, nontender, nondistended, normal bowel sounds, no hepatosplenomegaly or masses. Extremities: without edema. Pulses 1-2+ bilaterally.     Review of Data:  Labs:  Orders Only on 10/05/2017   Component Date Value Ref Range Status    Glucose 10/05/2017 123* 65 - 99 mg/dL Final    BUN 10/05/2017 15  8 - 27 mg/dL Final    Creatinine 10/05/2017 1.05  0.76 - 1.27 mg/dL Final    GFR est non-AA 10/05/2017 74  >59 mL/min/1.73 Final    GFR est AA 10/05/2017 86  >59 mL/min/1.73 Final    BUN/Creatinine ratio 10/05/2017 14  10 - 24 Final    Sodium 10/05/2017 145* 134 - 144 mmol/L Final    Potassium 10/05/2017 4.9  3.5 - 5.2 mmol/L Final    Chloride 10/05/2017 106  96 - 106 mmol/L Final    CO2 10/05/2017 26  18 - 29 mmol/L Final    Calcium 10/05/2017 9.5  8.6 - 10.2 mg/dL Final    Cholesterol, total 10/05/2017 188  100 - 199 mg/dL Final    Triglyceride 10/05/2017 89  0 - 149 mg/dL Final    HDL Cholesterol 10/05/2017 38* >39 mg/dL Final    VLDL, calculated 10/05/2017 18  5 - 40 mg/dL Final    LDL, calculated 10/05/2017 132* 0 - 99 mg/dL Final    Hemoglobin A1c 10/05/2017 6.9* 4.8 - 5.6 % Final    Estimated average glucose 10/05/2017 151  mg/dL Final    INTERPRETATION 10/05/2017 Note   Final     Labs 12/28/2016:  Creatinine 1.01/ eGFR 91     Glucose 120     TSH 0.78/ free T4 1.2     HbA1c 6.3    Health Maintenance:  Screening:    Colorectal: colonoscopy (3/2017) tubular adenoma. Dr. Mónica Holland. Due 2022. Depression: none   DM (HbA1c/FPG): HbA1c 6.9 (10/2017)   Hepatitis C: negative (2/2016)   Falls: none   DEXA: N/A   PSA/ANU: PSA 1.2/ ANU (7/2017)   Glaucoma: regular eye exams with Dr. Taylor Christie of [facility] (last 10/2016)   Smoking: distant past   Vitamin D: 32.4 (7/2017)   Medicare Wellness: IPPE today    Impression:  Patient Active Problem List   Diagnosis Code    Hyperlipidemia E78.5    Family history of prostate cancer Z80.42    Colon polyps K63.5    Gastric ulcer K25.9    Deep vein thrombosis (DVT); left popliteal, gastrocnemius, soleus veins 9/2012 I82.432    Diabetes mellitus ([facility] E11.9    Essential hypertension I10    GERD (gastroesophageal reflux disease) K21.9    Vitamin D insufficiency E55.9    Advance directive discussed with patient Z71.89       Plan:  1. Diabetes mellitus. HbA1c remains elevated at 6.9. Gained another four pounds since last visit, for a total of seventeen pounds since retiring earlier this year. Will begin metformin  mg daily with dinner. Stressed importance of dietary changes and weight loss. Also encouraged to resume exercising.  Referred to diabetes education for assistance after last visit, but patient states he was not contacted to schedule. No current evidence of microvascular complications. Urine microalbumin/ creatinine ratio without evidence of microalbuminuria, and foot exam normal in 7/2017. On statin, but not on Ace-I or ARB. Will consider starting at next visit as blood pressure tolerates. Continue follow-up for annual eye exams. Emphasized importance of lifestyle modifications, including diet, exercise, and weight loss. Recheck HbA1c in 3 months. 2. Hypertension. Well controlled at home and in office without medication. Renal function normal with creatinine 1.05/ eGFR >60. Consider starting low dose Ace-I/ ARB as blood pressure will tolerate given diabetes mellitus for loan-protective effect. Continue to follow. 3. Hyperlipidemia. On low intensity dose simvastatin with LDL increased to 132 since last visit, indicative of poor control. Did not improve with lifestyle changes. Goal LDL would be < 70 in this patient with diabetes mellitus. Will increase dose of simvastatin to 20 mg daily. Emphasized importance of lifestyle modifications, including diet, exercise, and weight loss. Referral to nutrition counseling to help with lifestyle changes. Recheck lipid panel in 3 months. 4. Right upper arm mass, s/p excisional biopsy. Pathology consistent with epidermal inclusion cyst. No further intervention needed. 5. H/O provoked DVT. No evidence of recurrence since 2012. Continue to follow. 6. GERD. Symptomatic control with over the counter antacids. Follow. 7. Health maintenance. Received pneumovax last visit. Will give Prevnar in 7/2018. Other immunizations up to date. Colonoscopy due 2022. Prostate cancer screening up to date. Continue annual eye exams. Due currently and urged to schedule. Vitamin D level normal. Continue maintenance dose supplement. In addition, an Welcome to Medicare visit was done today.  Will obtain EKG baseline and given positive history of smoking, will also order screening ultrasound for AAA. Patient understands recommendations and agrees with plan. Follow-up in 3 months. Addendum  EKG (10/12/2017) sinus bradycardia at 57 bpm. First degree AV block with MA 0.21. No ischemic changes. (Prolonged MA new from 8/2014; otherwise unchanged).

## 2017-10-23 ENCOUNTER — TELEPHONE (OUTPATIENT)
Dept: INTERNAL MEDICINE CLINIC | Age: 67
End: 2017-10-23

## 2017-10-23 ENCOUNTER — HOSPITAL ENCOUNTER (OUTPATIENT)
Dept: ULTRASOUND IMAGING | Age: 67
Discharge: HOME | End: 2017-10-23
Attending: INTERNAL MEDICINE
Payer: MEDICARE

## 2017-10-23 DIAGNOSIS — Z13.6 ENCOUNTER FOR SCREENING FOR CARDIOVASCULAR DISORDERS: ICD-10-CM

## 2017-10-23 DIAGNOSIS — Z00.00 WELCOME TO MEDICARE PREVENTIVE VISIT: ICD-10-CM

## 2017-10-23 PROCEDURE — 76706 US ABDL AORTA SCREEN AAA: CPT

## 2017-10-24 NOTE — TELEPHONE ENCOUNTER
Please let the patient know that his abdominal ultrasound showed no evidence of an aortic aneurysm. Thanks.

## 2017-12-07 ENCOUNTER — HOSPITAL ENCOUNTER (OUTPATIENT)
Dept: NUTRITION | Age: 67
Discharge: HOME | End: 2017-12-07
Payer: MEDICARE

## 2017-12-07 PROCEDURE — 97802 MEDICAL NUTRITION INDIV IN: CPT

## 2017-12-07 NOTE — PROGRESS NOTES
[address] [place] [address], [address] [facility], [address] [facility] [address]  Phone: [phone]  Fax: [phone]   Nutrition Assessment - Medical Nutrition Therapy   Outpatient Initial Evaluation         Patient Name: Luca Garcia : 1950   Treatment Diagnosis: Diabetes    Referral Source: Gregory Mccurdy MD [place] [facility]): 2017     Gender: male Age: 74 y.o. Ht: 74 in Wt:  206.4 lb  kg   BMI: 26.6 BMR   Male  BMR Female    Anthropometrics Assessment: Moderate abdominal adiposity is evident based on visual observation     Past Medical History includes: Hyperlipidemia. Family history of DM. Pertinent Medications:   Metformin     Biochemical Data:   Lab Results   Component Value Date/Time    Hemoglobin A1c 6.9 10/05/2017 12:00 AM    Hemoglobin A1c, External 6.3 2016     Lab Results   Component Value Date/Time    Cholesterol, total 188 10/05/2017 12:00 AM    HDL Cholesterol 38 10/05/2017 12:00 AM    LDL, calculated 132 10/05/2017 12:00 AM    VLDL, calculated 18 10/05/2017 12:00 AM    Triglyceride 89 10/05/2017 12:00 AM    CHOL/HDL Ratio 4.1 2017 08:30 AM     Lab Results   Component Value Date/Time    ALT (SGPT) 55 2017 08:30 AM    AST (SGOT) 29 2017 08:30 AM    Alk. phosphatase 62 2017 08:30 AM    Bilirubin, total 0.4 2017 08:30 AM     Lab Results   Component Value Date/Time    Creatinine 1.05 10/05/2017 12:00 AM     Lab Results   Component Value Date/Time    BUN 15 10/05/2017 12:00 AM     Lab Results   Component Value Date/Time    Microalbumin/Creat ratio (mg/g creat) 6 2017 08:30 AM    Microalbumin,urine random 0.90 2017 08:30 AM        Subjective/Assessment:   Pt did not know whether he had a DM diagnosis, but he was aware of elevated BS's and wants this to better controlled. He has a family history of DM and has had several family members experience health complications related to DM.  Pt has not had previous DM education and was receptive to learning. Pt is retired, lives at home with his wife. They prepare meals together and have begun making healthier dietary changes, such as using an air-fryer. Pt does not currently exercise but they have a new treadmill and plan to begin walking. He verbalized understanding of information and is motivated for behavior change. Pt will benefit from nutrition follow up in early January to review dietary changes and address questions/concerns. Will follow. Current Eating Patterns: Pt will typically eat breakfast between 10-11 AM (honey nut cheerios with Lactaid or alvarado/cheese with eggs, potatoes, honey wheat toast). Skip lunch, maybe have after noon snack (fruit), dinner at 6-7 PM (meat, starch, vegetable), snack (lightly-salted chips or almonds). Pt uses sugar-substitutes to bonifacio drinks; does not drink sugary beverages except for slightly sweetened tea at dinner. Estimate Needs   Calories:  2000 Protein: 175 Carbs: 175 Fat: 67   Kcal/day  g/day  g/day  g/day        percent: 35  35  30               Education & Recommendations provided: Educated pt on the pathophysiology of Type II Diabetes, insulin resistance and the rationale for dietary modifications and increased activity. Educated pt on carbohydrate food sources, counting carbohydrates, label reading, meal timing, and appropriate serving sizes.     Handouts Provided: [x]  Carbohydrates  [x]  Protein  []  Fiber  []  Serving Sizes  [x]  Meal and Snack Ideas  []  Food Journals [x]  Diabetes  []  Cholesterol  []  Sodium  [x]  Gen Nutr Guidelines  []  SBGM Guidelines  [x]  Others:Plate Method   Information Reviewed with: Pt   Readiness to Change Stage: []  Pre-contemplative    []  Contemplative  [x]  Preparation               []  Action                  []  Maintenance   Potential Barriers to Learning: []  Decline in memory    []  Language barrier   []  Other:  []  Emotional                  []  Limited mobility  []  Lack of motivation     [] Vision, hearing or cognitive impairment   Expected Compliance: Good     Nutritional Goal - To promote lifestyle changes to result in:    []  Weight loss  [x]  Improved diabetic control  []  Decreased cholesterol levels  []  Decreased blood pressure  []  Weight maintenance []  Preventing any interactions associated with food allergies  []  Adequate weight gain toward goal weight  []  Other:        Patient Goals:  SMART goals 1. Follow consistent and appropriate meal timing, eating every 3-5 hours. 2. Always pair protein with carbs. Carb Limits: 30-45 gm/meal, 10-15 gm/snack   3. Use the plate method for portion contol and balance. 4. Exercise for a minimum of 15-20 min 3-4 times per week, walking on treadmill or outside, advance to daily with goal of 30 min.       Dietitian Signature: Erick Carrasquillo RDN Date: 12/7/2017   Follow-up: Wed, [place] 3 at 0830 Time: 9:56 AM

## 2018-01-03 ENCOUNTER — HOSPITAL ENCOUNTER (OUTPATIENT)
Dept: NUTRITION | Age: 68
Discharge: HOME | End: 2018-01-03
Payer: MEDICARE

## 2018-01-03 PROCEDURE — 97803 MED NUTRITION INDIV SUBSEQ: CPT

## 2018-01-03 NOTE — PROGRESS NOTES
NUTRITION - FOLLOW-UP TREATMENT NOTE  Patient Name: Ammy Majano         Date: 1/3/2018  : 1950    YES Patient  Verified  Diagnosis: DM   In time:   830           Out time:   09   Total Treatment Time (min):   30     SUBJECTIVE/ASSESSMENT  Changes in medication or medical history? Any new allergies, surgeries or procedures? NO    If yes, update Summary List   Pt states it was difficult to follow through with his goals during December, with the holidays and traveling. However, he is motivated now to begin making lifestyle changes, knowing now is the time to start for it to become habit. The plan is to eat right and be active. Discussed the practical steps and realistic goals in order to eat right and be physically active, for better BG control and overall health. Pt verbalized understanding and expect compliance with goals set. Will schedule visits quarterly for 2018. Current Wt: 213.2 Previous Wt: 206.4 Wt Change: +6.8     Achievement of Goals: 1. Follow consistent and appropriate meal timing, eating every 3-5 hours. =met, continue  2. Always pair protein with carbs.=continue   Carb Limits: 30-45 gm/meal, 10-15 gm/snack   3. Use the plate method for portion contol and balance.=continue  4. Exercise for a minimum of 15-20 min 3-4 times per week, walking on treadmill or outside, advance to daily with goal of 30 min. =not met, continue     Patient Education:  [x]  Review current plan with patient   []  Other:    Handouts/  Information Provided: []  Carbohydrates  []  Protein  []  Fiber  []  Serving Sizes  []  Fluids  []  General guidelines []  Diabetes  []  Cholesterol  []  Sodium  []  SBGM  []  Food Journals  []  Others:      PLAN  []  Continue on current plan []  Follow-up PRN   []  Discharge due to :    [x]  Next appt:  at [address] [facility]     Dietitian: Ruchi Retana RDN    Date: 1/3/2018 Time: 9:54 AM

## 2018-01-12 ENCOUNTER — HOSPITAL ENCOUNTER (OUTPATIENT)
Dept: LAB | Age: 68
Discharge: HOME | End: 2018-01-12
Payer: MEDICARE

## 2018-01-12 DIAGNOSIS — E78.5 HYPERLIPIDEMIA, UNSPECIFIED HYPERLIPIDEMIA TYPE: ICD-10-CM

## 2018-01-12 DIAGNOSIS — E11.9 TYPE 2 DIABETES MELLITUS WITHOUT COMPLICATION, WITHOUT LONG-TERM CURRENT USE OF INSULIN (HCC): ICD-10-CM

## 2018-01-12 DIAGNOSIS — I10 ESSENTIAL HYPERTENSION: ICD-10-CM

## 2018-01-12 LAB
ALBUMIN SERPL-MCNC: 3.9 G/DL (ref 3.4–5)
ALBUMIN/GLOB SERPL: 1.3 {RATIO} (ref 0.8–1.7)
ALP SERPL-CCNC: 66 U/L (ref 45–117)
ALT SERPL-CCNC: 26 U/L (ref 16–61)
ANION GAP SERPL CALC-SCNC: 9 MMOL/L (ref 3–18)
AST SERPL-CCNC: 14 U/L (ref 15–37)
BILIRUB SERPL-MCNC: 0.7 MG/DL (ref 0.2–1)
BUN SERPL-MCNC: 18 MG/DL (ref 7–18)
BUN/CREAT SERPL: 18 (ref 12–20)
CALCIUM SERPL-MCNC: 9 MG/DL (ref 8.5–10.1)
CHLORIDE SERPL-SCNC: 107 MMOL/L (ref 100–108)
CHOLEST SERPL-MCNC: 188 MG/DL
CO2 SERPL-SCNC: 25 MMOL/L (ref 21–32)
CREAT SERPL-MCNC: 0.98 MG/DL (ref 0.6–1.3)
EST. AVERAGE GLUCOSE BLD GHB EST-MCNC: 154 MG/DL
GLOBULIN SER CALC-MCNC: 3.1 G/DL (ref 2–4)
GLUCOSE SERPL-MCNC: 124 MG/DL (ref 74–99)
HBA1C MFR BLD: 7 % (ref 4.2–5.6)
HDLC SERPL-MCNC: 42 MG/DL (ref 40–60)
HDLC SERPL: 4.5 {RATIO} (ref 0–5)
LDLC SERPL CALC-MCNC: 123.8 MG/DL (ref 0–100)
LIPID PROFILE,FLP: ABNORMAL
POTASSIUM SERPL-SCNC: 4 MMOL/L (ref 3.5–5.5)
PROT SERPL-MCNC: 7 G/DL (ref 6.4–8.2)
SODIUM SERPL-SCNC: 141 MMOL/L (ref 136–145)
TRIGL SERPL-MCNC: 111 MG/DL (ref ?–150)
VLDLC SERPL CALC-MCNC: 22.2 MG/DL

## 2018-01-12 PROCEDURE — 83036 HEMOGLOBIN GLYCOSYLATED A1C: CPT | Performed by: INTERNAL MEDICINE

## 2018-01-12 PROCEDURE — 36415 COLL VENOUS BLD VENIPUNCTURE: CPT | Performed by: INTERNAL MEDICINE

## 2018-01-12 PROCEDURE — 80061 LIPID PANEL: CPT | Performed by: INTERNAL MEDICINE

## 2018-01-12 PROCEDURE — 80053 COMPREHEN METABOLIC PANEL: CPT | Performed by: INTERNAL MEDICINE

## 2018-02-22 ENCOUNTER — TELEPHONE (OUTPATIENT)
Dept: INTERNAL MEDICINE CLINIC | Age: 68
End: 2018-02-22

## 2018-02-22 ENCOUNTER — OFFICE VISIT (OUTPATIENT)
Dept: INTERNAL MEDICINE CLINIC | Age: 68
End: 2018-02-22

## 2018-02-22 VITALS
HEART RATE: 75 BPM | HEIGHT: 74 IN | WEIGHT: 213 LBS | BODY MASS INDEX: 27.34 KG/M2 | RESPIRATION RATE: 18 BRPM | DIASTOLIC BLOOD PRESSURE: 82 MMHG | OXYGEN SATURATION: 97 % | TEMPERATURE: 98.5 F | SYSTOLIC BLOOD PRESSURE: 142 MMHG

## 2018-02-22 DIAGNOSIS — I82.432 DEEP VEIN THROMBOSIS (DVT) OF POPLITEAL VEIN OF LEFT LOWER EXTREMITY, UNSPECIFIED CHRONICITY (HCC): ICD-10-CM

## 2018-02-22 DIAGNOSIS — E55.9 VITAMIN D INSUFFICIENCY: ICD-10-CM

## 2018-02-22 DIAGNOSIS — Z80.42 FAMILY HISTORY OF PROSTATE CANCER: ICD-10-CM

## 2018-02-22 DIAGNOSIS — E78.5 HYPERLIPIDEMIA, UNSPECIFIED HYPERLIPIDEMIA TYPE: ICD-10-CM

## 2018-02-22 DIAGNOSIS — I10 ESSENTIAL HYPERTENSION: ICD-10-CM

## 2018-02-22 DIAGNOSIS — D12.4 ADENOMATOUS POLYP OF DESCENDING COLON: ICD-10-CM

## 2018-02-22 DIAGNOSIS — K21.9 GASTROESOPHAGEAL REFLUX DISEASE, ESOPHAGITIS PRESENCE NOT SPECIFIED: ICD-10-CM

## 2018-02-22 DIAGNOSIS — E11.9 TYPE 2 DIABETES MELLITUS WITHOUT COMPLICATION, WITHOUT LONG-TERM CURRENT USE OF INSULIN (HCC): Primary | ICD-10-CM

## 2018-02-22 RX ORDER — METFORMIN HYDROCHLORIDE 500 MG/1
500 TABLET ORAL 2 TIMES DAILY WITH MEALS
Qty: 60 TAB | Refills: 5 | Status: SHIPPED | OUTPATIENT
Start: 2018-02-22 | End: 2018-03-23 | Stop reason: SDUPTHER

## 2018-02-22 RX ORDER — ATORVASTATIN CALCIUM 10 MG/1
10 TABLET, FILM COATED ORAL DAILY
Qty: 30 TAB | Refills: 5 | Status: SHIPPED | OUTPATIENT
Start: 2018-02-22 | End: 2018-03-23 | Stop reason: SDUPTHER

## 2018-02-22 NOTE — PROGRESS NOTES
Chief Complaint   Patient presents with    Diabetes     3 month follow up with lab review. Hemoglobin A1C 7.0% resulted 1/12/2018. Patient states he hasn't taken his Metformin because it is too big and it's difficult to swallow. Health Maintenance Due   Topic Date Due    EYE EXAM RETINAL OR DILATED Q1  09/28/2017   Patient states he had an eye exam in Jan. 2018. 1. Have you been to the ER, urgent care clinic or hospitalized since your last visit? NO.     2. Have you seen or consulted any other health care providers outside of the [address] [place] [address] since your last visit (Include any pap smears or colon screening)? NO      Do you have an Advanced Directive? NO    Would you like information on Advanced Directives?  NO

## 2018-02-22 NOTE — MR AVS SNAPSHOT
Greta Head 
 
 
 [address], [address] [facility] 
[phone] Patient: Omari Chapin MRN: [id number] Dianna Mensah Visit Information Date & Time Provider Department Dept. Phone Encounter #  
 2/22/2018  1:30 PM Whitley Magaña MD [facility] [phone] [id number] Follow-up Instructions Return in about 3 months (around 5/22/2018), or if symptoms worsen or fail to improve. Your Appointments 5/24/2018  9:10 AM  
LAB with [facility] NURSE VISIT [facility] ([address] [place] [address]) Appt Note: lab  
 [address], [address] [id number] [address] [place] [place]  
  
   
 [address], [address]  
  
    
 5/31/2018 12:00 PM  
Office Visit with Whitley Magaña MD  
[facility] [address]) Appt Note: 3 month follow up  
 [address], [address] [id number] [address] [place]  
  
   
 [address], [address] Upcoming Health Maintenance Date Due  
 EYE EXAM RETINAL OR DILATED Q1 3/28/2018* MICROALBUMIN Q1 7/5/2018 Pneumococcal 65+ Low/Medium Risk (1 of 2 - PCV13) 7/12/2018 FOOT EXAM Q1 7/12/2018 HEMOGLOBIN A1C Q6M 7/12/2018 MEDICARE YEARLY EXAM 10/13/2018 LIPID PANEL Q1 1/12/2019 GLAUCOMA SCREENING Q2Y 1/24/2020 COLONOSCOPY 3/20/2022 DTaP/Tdap/Td series (2 - Td) 1/4/2027 *Topic was postponed. The date shown is not the original due date. Allergies as of 2/22/2018  Review Complete On: 2/22/2018 By: Mark Fagan No Known Allergies Current Immunizations  Reviewed on 7/12/2017 Name Date Pneumococcal Polysaccharide (PPSV-23) 7/12/2017  9:20 AM  
 TD Vaccine 9/7/2007 Tdap 1/4/2017 Zoster Vaccine, Live 7/30/2013 Not reviewed this visit Vitals BP Pulse Temp Resp Height(growth percentile) Weight(growth percentile) 150/80 (BP 1 Location: Left arm, BP Patient Position: Sitting) 75 98.5 °F (36.9 °C) (Oral) 18 6' 2\" (1.88 m) 213 lb (96.6 kg) SpO2 BMI Smoking Status 97% 27.35 kg/m2 Former Smoker Vitals History BMI and BSA Data Body Mass Index Body Surface Area  
 27.35 kg/m 2 2.25 m 2 Preferred Pharmacy Pharmacy Name Phone [address] [phone] Your Updated Medication List  
  
   
This list is accurate as of 2/22/18  2:04 PM.  Always use your most recent med list.  
  
  
  
  
 atorvastatin 10 mg tablet Commonly known as:  LIPITOR Take 1 Tab by mouth daily. cholecalciferol 1,000 unit Cap Commonly known as:  VITAMIN D3 Take 1,000 Units by mouth daily. metFORMIN 500 mg tablet Commonly known as:  GLUCOPHAGE Take 1 Tab by mouth two (2) times daily (with meals). Prescriptions Sent to Pharmacy Refills  
 metFORMIN (GLUCOPHAGE) 500 mg tablet 5 Sig: Take 1 Tab by mouth two (2) times daily (with meals). Class: Normal  
 Pharmacy: [facility] [address] SAMANTHA WHEATLEY [address] [place] [address] Ph #: [phone] Route: Oral  
 atorvastatin (LIPITOR) 10 mg tablet 5 Sig: Take 1 Tab by mouth daily. Class: Normal  
 Pharmacy: [facility] [address] SAM[address] WHEATLEY [address] [place] [address] Ph #: [phone] Route: Oral  
  
Follow-up Instructions Return in about 3 months (around 5/22/2018), or if symptoms worsen or fail to improve. To-Do List   
 03/20/2018 9:30 AM  
  Appointment with Ana M Peña at [facility] Patient Instructions Learning About Diabetes Food Guidelines Your Care Instructions Meal planning is important to manage diabetes. It helps keep your blood sugar at a target level (which you set with your doctor). You don't have to eat special foods.  You can eat what your family eats, including sweets once in a while. But you do have to pay attention to how often you eat and how much you eat of certain foods. You may want to work with a dietitian or a certified diabetes educator (CDE) to help you plan meals and snacks. A dietitian or CDE can also help you lose weight if that is one of your goals. What should you know about eating carbs? Managing the amount of carbohydrate (carbs) you eat is an important part of healthy meals when you have diabetes. Carbohydrate is found in many foods. · Learn which foods have carbs. And learn the amounts of carbs in different foods. ¨ Bread, cereal, pasta, and rice have about 15 grams of carbs in a serving. A serving is 1 slice of bread (1 ounce), ½ cup of cooked cereal, or 1/3 cup of cooked pasta or rice. ¨ Fruits have 15 grams of carbs in a serving. A serving is 1 small fresh fruit, such as an apple or orange; ½ of a banana; ½ cup of cooked or canned fruit; ½ cup of fruit juice; 1 cup of melon or raspberries; or 2 tablespoons of dried fruit. ¨ Milk and no-sugar-added yogurt have 15 grams of carbs in a serving. A serving is 1 cup of milk or 2/3 cup of no-sugar-added yogurt. ¨ Starchy vegetables have 15 grams of carbs in a serving. A serving is ½ cup of mashed potatoes or sweet potato; 1 cup winter squash; ½ of a small baked potato; ½ cup of cooked beans; or ½ cup cooked corn or green peas. · Learn how much carbs to eat each day and at each meal. A dietitian or CDE can teach you how to keep track of the amount of carbs you eat. This is called carbohydrate counting. · If you are not sure how to count carbohydrate grams, use the Plate Method to plan meals. It is a good, quick way to make sure that you have a balanced meal. It also helps you spread carbs throughout the day. ¨ Divide your plate by types of foods.  Put non-starchy vegetables on half the plate, meat or other protein food on one-quarter of the plate, and a grain or starchy vegetable in the final quarter of the plate. To this you can add a small piece of fruit and 1 cup of milk or yogurt, depending on how many carbs you are supposed to eat at a meal. 
· Try to eat about the same amount of carbs at each meal. Do not \"save up\" your daily allowance of carbs to eat at one meal. 
· Proteins have very little or no carbs per serving. Examples of proteins are beef, chicken, turkey, fish, eggs, tofu, cheese, cottage cheese, and peanut butter. A serving size of meat is 3 ounces, which is about the size of a deck of cards. Examples of meat substitute serving sizes (equal to 1 ounce of meat) are 1/4 cup of cottage cheese, 1 egg, 1 tablespoon of peanut butter, and ½ cup of tofu. How can you eat out and still eat healthy? · Learn to estimate the serving sizes of foods that have carbohydrate. If you measure food at home, it will be easier to estimate the amount in a serving of restaurant food. · If the meal you order has too much carbohydrate (such as potatoes, corn, or baked beans), ask to have a low-carbohydrate food instead. Ask for a salad or green vegetables. · If you use insulin, check your blood sugar before and after eating out to help you plan how much to eat in the future. · If you eat more carbohydrate at a meal than you had planned, take a walk or do other exercise. This will help lower your blood sugar. What else should you know? · Limit saturated fat, such as the fat from meat and dairy products. This is a healthy choice because people who have diabetes are at higher risk of heart disease. So choose lean cuts of meat and nonfat or low-fat dairy products. Use olive or canola oil instead of butter or shortening when cooking. · Don't skip meals. Your blood sugar may drop too low if you skip meals and take insulin or certain medicines for diabetes. · Check with your doctor before you drink alcohol.  Alcohol can cause your blood sugar to drop too low. Alcohol can also cause a bad reaction if you take certain diabetes medicines. Follow-up care is a key part of your treatment and safety. Be sure to make and go to all appointments, and call your doctor if you are having problems. It's also a good idea to know your test results and keep a list of the medicines you take. Where can you learn more? Go to http://yanet-sergio.info/. Enter W870 in the search box to learn more about \"Learning About Diabetes Food Guidelines. \" Current as of: March 13, 2017 Content Version: 11.4 © 2367-0144 [employer]ocketOn. Care instructions adapted under license by [employer] (which disclaims liability or warranty for this information). If you have questions about a medical condition or this instruction, always ask your healthcare professional. [address] any warranty or liability for your use of this information. Learning About Healthy Weight What is a healthy weight? A healthy weight is the weight at which you feel good about yourself and have energy for work and play. It's also one that lowers your risk for health problems. What can you do to stay at a healthy weight? It can be hard to stay at a healthy weight, especially when fast food, vending-machine snacks, and processed foods are so easy to find. And with your busy lifestyle, activity may be low on your list of things to do. But staying at a healthy weight may be easier than you think. Here are some dos and don'ts for staying at a healthy weight: 
Do eat healthy foods The kinds of foods you eat have a big impact on both your weight and your health. Reaching and staying at a healthy weight is not about going on a diet. It's about making healthier food choices every day and changing your diet for good.  
Healthy eating means eating a variety of foods so that you get all the nutrients you need. Your body needs protein, carbohydrate, and fats for energy. They keep your heart beating, your brain active, and your muscles working. On most days, try to eat from each food group. This means eating a variety of: · Whole grains, such as whole wheat breads and pastas. · Fruits and vegetables. · Dairy products, such as low-fat milk, yogurt, and cheese. · Lean proteins, such as all types of fish, chicken without the skin, and beans. Don't have too much or too little of one thing. All foods, if eaten in moderation, can be part of healthy eating. Even sweets can be okay. If your favorite foods are high in fat, salt, sugar, or calories, limit how often you eat them. Eat smaller servings, or look for healthy substitutes. Do watch what you eat Many people eat more than their bodies need. Part of staying at a healthy weight means learning how much food you really need from day to day and not eating more than that. Even with healthy foods, eating too much can make you gain weight. Having a well-balanced diet means that you eat enough, but not too much, and that your food gives you the nutrients you need to stay healthy. So listen to your body. Eat when you're hungry. Stop when you feel satisfied. It's a good idea to have healthy snacks ready for when you get hungry. Keep healthy snacks with you at work, in your car, and at home. If you have a healthy snack easily available, you'll be less likely to pick a candy bar or bag of chips from a vending machine instead. Some healthy snacks you might want to keep on hand are fruit, low-fat yogurt, string cheese, low-fat microwave popcorn, raisins and other dried fruit, nuts, whole wheat crackers, pretzels, carrots, celery sticks, and broccoli. Do some physical activity A big part of reaching and staying at a healthy weight is being active. When you're active, you burn calories.  This makes it easier to reach and stay at a healthy weight. When you're active on a regular basis, your body burns more calories, even when you're at rest. Being active helps you lose fat and build lean muscle. Try to be active for at least 1 hour every day. This may sound like a lot, but it's okay to be active in smaller blocks of time that add up to 1 hour a day. Any activity that makes your heart beat faster and keeps it there for a while counts. A brisk walk, run, or swim will get your heart beating faster. So will climbing stairs, shooting baskets, or cycling. Even some household chores like vacuuming and mowing the lawn will get your heart rate up. Pick activities that you enjoy-ones that make your heart beat faster, your muscles stronger, and your muscles and joints more flexible. If you find more than one thing you like doing, do them all. You don't have to do the same thing every day. Don't diet Diets don't work. Diets are temporary. Because you give up so much when you diet, you may be hungry and think about food all the time. And after you stop dieting, you also may overeat to make up for what you missed. Most people who diet end up gaining back the pounds they lost-and more. Remember that healthy bodies come in lots of shapes and sizes. Everyone can get healthier by eating better and being more active. Where can you learn more? Go to http://yanet-sergio.info/. Enter 368 4541 in the search box to learn more about \"Learning About Healthy Weight. \" Current as of: October 13, 2016 Content Version: 11.4 © 5302-3949 [employer]Crowd. Care instructions adapted under license by [employer] (which disclaims liability or warranty for this information). If you have questions about a medical condition or this instruction, always ask your healthcare professional. [address] any warranty or liability for your use of this information. Body Mass Index: Care Instructions Your Care Instructions Body mass index (BMI) can help you see if your weight is raising your risk for health problems. It uses a formula to compare how much you weigh with how tall you are. · A BMI lower than 18.5 is considered underweight. · A BMI between 18.5 and 24.9 is considered healthy. · A BMI between 25 and 29.9 is considered overweight. A BMI of 30 or higher is considered obese. If your BMI is in the normal range, it means that you have a lower risk for weight-related health problems. If your BMI is in the overweight or obese range, you may be at increased risk for weight-related health problems, such as high blood pressure, heart disease, stroke, arthritis or joint pain, and diabetes. If your BMI is in the underweight range, you may be at increased risk for health problems such as fatigue, lower protection (immunity) against illness, muscle loss, bone loss, hair loss, and hormone problems. BMI is just one measure of your risk for weight-related health problems. You may be at higher risk for health problems if you are not active, you eat an unhealthy diet, or you drink too much alcohol or use tobacco products. Follow-up care is a key part of your treatment and safety. Be sure to make and go to all appointments, and call your doctor if you are having problems. It's also a good idea to know your test results and keep a list of the medicines you take. How can you care for yourself at home? · Practice healthy eating habits. This includes eating plenty of fruits, vegetables, whole grains, lean protein, and low-fat dairy. · If your doctor recommends it, get more exercise. Walking is a good choice. Bit by bit, increase the amount you walk every day. Try for at least 30 minutes on most days of the week. · Do not smoke. Smoking can increase your risk for health problems.  If you need help quitting, talk to your doctor about stop-smoking programs and medicines. These can increase your chances of quitting for good. · Limit alcohol to 2 drinks a day for men and 1 drink a day for women. Too much alcohol can cause health problems. If you have a BMI higher than 25 · Your doctor may do other tests to check your risk for weight-related health problems. This may include measuring the distance around your waist. A waist measurement of more than 40 inches in men or 35 inches in women can increase the risk of weight-related health problems. · Talk with your doctor about steps you can take to stay healthy or improve your health. You may need to make lifestyle changes to lose weight and stay healthy, such as changing your diet and getting regular exercise. If you have a BMI lower than 18.5 · Your doctor may do other tests to check your risk for health problems. · Talk with your doctor about steps you can take to stay healthy or improve your health. You may need to make lifestyle changes to gain or maintain weight and stay healthy, such as getting more healthy foods in your diet and doing exercises to build muscle. Where can you learn more? Go to http://yanet-sergio.info/. Enter S176 in the search box to learn more about \"Body Mass Index: Care Instructions. \" Current as of: October 13, 2016 Content Version: 11.4 © 3179-8950 Healthwise, [employer]. Care instructions adapted under license by [employer] (which disclaims liability or warranty for this information). If you have questions about a medical condition or this instruction, always ask your healthcare professional. [address] any warranty or liability for your use of this information. Introducing [facility]! Dear Charmaine Pereira: Thank you for requesting a Kreyonic account. Our records indicate that you already have an active Kreyonic account. You can access your account anytime at [email or web address] Did you know that you can access your hospital and ER discharge instructions at any time in PlayEnable? You can also review all of your test results from your hospital stay or ER visit. Additional Information If you have questions, please visit the Frequently Asked Questions section of the PlayEnable website at [email or web address] Remember, PlayEnable is NOT to be used for urgent needs. For medical emergencies, dial 911. Now available from your iPhone and Android! Please provide this summary of care documentation to your next provider. Your primary care clinician is listed as Urbano Weston. If you have any questions after today's visit, please call [phone].

## 2018-02-22 NOTE — PATIENT INSTRUCTIONS
Learning About Diabetes Food Guidelines  Your Care Instructions    Meal planning is important to manage diabetes. It helps keep your blood sugar at a target level (which you set with your doctor). You don't have to eat special foods. You can eat what your family eats, including sweets once in a while. But you do have to pay attention to how often you eat and how much you eat of certain foods. You may want to work with a dietitian or a certified diabetes educator (CDE) to help you plan meals and snacks. A dietitian or CDE can also help you lose weight if that is one of your goals. What should you know about eating carbs? Managing the amount of carbohydrate (carbs) you eat is an important part of healthy meals when you have diabetes. Carbohydrate is found in many foods. · Learn which foods have carbs. And learn the amounts of carbs in different foods. ¨ Bread, cereal, pasta, and rice have about 15 grams of carbs in a serving. A serving is 1 slice of bread (1 ounce), ½ cup of cooked cereal, or 1/3 cup of cooked pasta or rice. ¨ Fruits have 15 grams of carbs in a serving. A serving is 1 small fresh fruit, such as an apple or orange; ½ of a banana; ½ cup of cooked or canned fruit; ½ cup of fruit juice; 1 cup of melon or raspberries; or 2 tablespoons of dried fruit. ¨ Milk and no-sugar-added yogurt have 15 grams of carbs in a serving. A serving is 1 cup of milk or 2/3 cup of no-sugar-added yogurt. ¨ Starchy vegetables have 15 grams of carbs in a serving. A serving is ½ cup of mashed potatoes or sweet potato; 1 cup winter squash; ½ of a small baked potato; ½ cup of cooked beans; or ½ cup cooked corn or green peas. · Learn how much carbs to eat each day and at each meal. A dietitian or CDE can teach you how to keep track of the amount of carbs you eat. This is called carbohydrate counting. · If you are not sure how to count carbohydrate grams, use the Plate Method to plan meals.  It is a good, quick way to make sure that you have a balanced meal. It also helps you spread carbs throughout the day. ¨ Divide your plate by types of foods. Put non-starchy vegetables on half the plate, meat or other protein food on one-quarter of the plate, and a grain or starchy vegetable in the final quarter of the plate. To this you can add a small piece of fruit and 1 cup of milk or yogurt, depending on how many carbs you are supposed to eat at a meal.  · Try to eat about the same amount of carbs at each meal. Do not \"save up\" your daily allowance of carbs to eat at one meal.  · Proteins have very little or no carbs per serving. Examples of proteins are beef, chicken, turkey, fish, eggs, tofu, cheese, cottage cheese, and peanut butter. A serving size of meat is 3 ounces, which is about the size of a deck of cards. Examples of meat substitute serving sizes (equal to 1 ounce of meat) are 1/4 cup of cottage cheese, 1 egg, 1 tablespoon of peanut butter, and ½ cup of tofu. How can you eat out and still eat healthy? · Learn to estimate the serving sizes of foods that have carbohydrate. If you measure food at home, it will be easier to estimate the amount in a serving of restaurant food. · If the meal you order has too much carbohydrate (such as potatoes, corn, or baked beans), ask to have a low-carbohydrate food instead. Ask for a salad or green vegetables. · If you use insulin, check your blood sugar before and after eating out to help you plan how much to eat in the future. · If you eat more carbohydrate at a meal than you had planned, take a walk or do other exercise. This will help lower your blood sugar. What else should you know? · Limit saturated fat, such as the fat from meat and dairy products. This is a healthy choice because people who have diabetes are at higher risk of heart disease. So choose lean cuts of meat and nonfat or low-fat dairy products.  Use olive or canola oil instead of butter or shortening when cooking. · Don't skip meals. Your blood sugar may drop too low if you skip meals and take insulin or certain medicines for diabetes. · Check with your doctor before you drink alcohol. Alcohol can cause your blood sugar to drop too low. Alcohol can also cause a bad reaction if you take certain diabetes medicines. Follow-up care is a key part of your treatment and safety. Be sure to make and go to all appointments, and call your doctor if you are having problems. It's also a good idea to know your test results and keep a list of the medicines you take. Where can you learn more? Go to http://yanet-sergio.info/. Enter O890 in the search box to learn more about \"Learning About Diabetes Food Guidelines. \"  Current as of: March 13, 2017  Content Version: 11.4  © 4105-0395 [employer]. Care instructions adapted under license by [facility] (which disclaims liability or warranty for this information). If you have questions about a medical condition or this instruction, always ask your healthcare professional. [place] [address] any warranty or liability for your use of this information. Learning About Healthy Weight  What is a healthy weight? A healthy weight is the weight at which you feel good about yourself and have energy for work and play. It's also one that lowers your risk for health problems. What can you do to stay at a healthy weight? It can be hard to stay at a healthy weight, especially when fast food, vending-machine snacks, and processed foods are so easy to find. And with your busy lifestyle, activity may be low on your list of things to do. But staying at a healthy weight may be easier than you think. Here are some dos and don'ts for staying at a healthy weight:  Do eat healthy foods  The kinds of foods you eat have a big impact on both your weight and your health. Reaching and staying at a healthy weight is not about going on a diet. It's about making healthier food choices every day and changing your diet for good. Healthy eating means eating a variety of foods so that you get all the nutrients you need. Your body needs protein, carbohydrate, and fats for energy. They keep your heart beating, your brain active, and your muscles working. On most days, try to eat from each food group. This means eating a variety of:  · Whole grains, such as whole wheat breads and pastas. · Fruits and vegetables. · Dairy products, such as low-fat milk, yogurt, and cheese. · Lean proteins, such as all types of fish, chicken without the skin, and beans. Don't have too much or too little of one thing. All foods, if eaten in moderation, can be part of healthy eating. Even sweets can be okay. If your favorite foods are high in fat, salt, sugar, or calories, limit how often you eat them. Eat smaller servings, or look for healthy substitutes. Do watch what you eat  Many people eat more than their bodies need. Part of staying at a healthy weight means learning how much food you really need from day to day and not eating more than that. Even with healthy foods, eating too much can make you gain weight. Having a well-balanced diet means that you eat enough, but not too much, and that your food gives you the nutrients you need to stay healthy. So listen to your body. Eat when you're hungry. Stop when you feel satisfied. It's a good idea to have healthy snacks ready for when you get hungry. Keep healthy snacks with you at work, in your car, and at home. If you have a healthy snack easily available, you'll be less likely to pick a candy bar or bag of chips from a vending machine instead. Some healthy snacks you might want to keep on hand are fruit, low-fat yogurt, string cheese, low-fat microwave popcorn, raisins and other dried fruit, nuts, whole wheat crackers, pretzels, carrots, celery sticks, and broccoli.   Do some physical activity  A big part of reaching and staying at a healthy weight is being active. When you're active, you burn calories. This makes it easier to reach and stay at a healthy weight. When you're active on a regular basis, your body burns more calories, even when you're at rest. Being active helps you lose fat and build lean muscle. Try to be active for at least 1 hour every day. This may sound like a lot, but it's okay to be active in smaller blocks of time that add up to 1 hour a day. Any activity that makes your heart beat faster and keeps it there for a while counts. A brisk walk, run, or swim will get your heart beating faster. So will climbing stairs, shooting baskets, or cycling. Even some household chores like vacuuming and mowing the lawn will get your heart rate up. Pick activities that you enjoy-ones that make your heart beat faster, your muscles stronger, and your muscles and joints more flexible. If you find more than one thing you like doing, do them all. You don't have to do the same thing every day. Don't diet  Diets don't work. Diets are temporary. Because you give up so much when you diet, you may be hungry and think about food all the time. And after you stop dieting, you also may overeat to make up for what you missed. Most people who diet end up gaining back the pounds they lost-and more. Remember that healthy bodies come in lots of shapes and sizes. Everyone can get healthier by eating better and being more active. Where can you learn more? Go to http://yanet-sergio.info/. Enter 369 6746 in the search box to learn more about \"Learning About Healthy Weight. \"  Current as of: October 13, 2016  Content Version: 11.4  © 3614-2100 [employer]. Care instructions adapted under license by [employer] (which disclaims liability or warranty for this information).  If you have questions about a medical condition or this instruction, always ask your healthcare professional. [employer] disclaims any warranty or liability for your use of this information. Body Mass Index: Care Instructions  Your Care Instructions    Body mass index (BMI) can help you see if your weight is raising your risk for health problems. It uses a formula to compare how much you weigh with how tall you are. · A BMI lower than 18.5 is considered underweight. · A BMI between 18.5 and 24.9 is considered healthy. · A BMI between 25 and 29.9 is considered overweight. A BMI of 30 or higher is considered obese. If your BMI is in the normal range, it means that you have a lower risk for weight-related health problems. If your BMI is in the overweight or obese range, you may be at increased risk for weight-related health problems, such as high blood pressure, heart disease, stroke, arthritis or joint pain, and diabetes. If your BMI is in the underweight range, you may be at increased risk for health problems such as fatigue, lower protection (immunity) against illness, muscle loss, bone loss, hair loss, and hormone problems. BMI is just one measure of your risk for weight-related health problems. You may be at higher risk for health problems if you are not active, you eat an unhealthy diet, or you drink too much alcohol or use tobacco products. Follow-up care is a key part of your treatment and safety. Be sure to make and go to all appointments, and call your doctor if you are having problems. It's also a good idea to know your test results and keep a list of the medicines you take. How can you care for yourself at home? · Practice healthy eating habits. This includes eating plenty of fruits, vegetables, whole grains, lean protein, and low-fat dairy. · If your doctor recommends it, get more exercise. Walking is a good choice. Bit by bit, increase the amount you walk every day. Try for at least 30 minutes on most days of the week. · Do not smoke. Smoking can increase your risk for health problems.  If you need help quitting, talk to your doctor about stop-smoking programs and medicines. These can increase your chances of quitting for good. · Limit alcohol to 2 drinks a day for men and 1 drink a day for women. Too much alcohol can cause health problems. If you have a BMI higher than 25  · Your doctor may do other tests to check your risk for weight-related health problems. This may include measuring the distance around your waist. A waist measurement of more than 40 inches in men or 35 inches in women can increase the risk of weight-related health problems. · Talk with your doctor about steps you can take to stay healthy or improve your health. You may need to make lifestyle changes to lose weight and stay healthy, such as changing your diet and getting regular exercise. If you have a BMI lower than 18.5  · Your doctor may do other tests to check your risk for health problems. · Talk with your doctor about steps you can take to stay healthy or improve your health. You may need to make lifestyle changes to gain or maintain weight and stay healthy, such as getting more healthy foods in your diet and doing exercises to build muscle. Where can you learn more? Go to http://yanet-sergio.info/. Enter S176 in the search box to learn more about \"Body Mass Index: Care Instructions. \"  Current as of: October 13, 2016  Content Version: 11.4  © 7803-6341 [employer]. Care instructions adapted under license by [employer] (which disclaims liability or warranty for this information). If you have questions about a medical condition or this instruction, always ask your healthcare professional. [place] [address] any warranty or liability for your use of this information.

## 2018-02-25 PROBLEM — Z71.89 ADVANCE DIRECTIVE DISCUSSED WITH PATIENT: Status: RESOLVED | Noted: 2017-10-15 | Resolved: 2018-02-25

## 2018-02-25 NOTE — PROGRESS NOTES
HPI:   Rob Donis is a 74y.o. year old male who presents today for evaluation of prehypertension, hyperlipidemia, diabetes mellitus, prior provoked DVT, and GERD. He was last seen on 10/12/2017 and started on metformin  mg daily for an increase in HbA1c to 6.9. He states today that he has not been taking it since he has found it too difficult to swallow given the size of the pill. He reports that he is otherwise doing well and is currently without complaints. He has a history of prehypertension, not treated with medication. He states that he continues to monitor his blood pressure at home and reports that it is ranging 129-135/ 70-80. He states that he is no longer walking for exercise, but states that he does do yard work. He denies any chest pain, shortness of breath at rest or with exertion, palpitations, lightheadedness, or edema. He also has a history of hyperlipidemia, treated with low intensity dose simvastatin. He has a history of diabetes mellitus, recently diagnosed in 2/2016 with HbA1c of 6.5. Fasting blood glucose have been elevated since 2010 ranging from 107-128. He denies any polyuria, polydipsia, nocturia, or blurry vision, and has no history of retinopathy, neuropathy, or nephropathy. He has regular eye exams with . [facility] [address] [facility]. He has a history of a provoked DVT, occurring in 8/2012 following an airplane ride home from [place]. Venous duplex scan (9/1/2012) at [facility] showed an acute nonocclusive DVT in the left popliteal vein, and an acute thrombus in the left gastrocnemius and soleus veins. He was treated with Lovenox and transitioned to coumadin and completed three months of therapy. 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 He reports occasional mild swelling in his left foot, but denies any calf pain or tenderness, shortness of breath, or pleuritic chest pain.  He has had multiple long plane rides since that time without recurrence. He had an excisional biopsy of a mass in his right posterior upper arm on 6/30/2017 by Dr. Stephen Aguilar. Pathology showed an epidermal inclusion cyst.     He has a history of GERD, but uses over the counter antacids occasionally. He also has a reported history of a gastric ulcer, but records are unavailable. He had a screening colonoscopy in 11/2011 by Dr. Khadra Carrasquillo showing mild diverticulosis, a 2 mm sessile polyp in the mid sigmoid colon (fulgurized), and a 5 mm polyp in the proximal sigmoid colon (pathology unavailable). He had another screening colonoscopy in 3/2017 by Dr. Khadra Carrasquillo showing a 3 mm sessile descending colon polyp (pathology: tubular adenoma). Follow-up recommended for five years. He denies any abdominal pain, nausea, vomiting, melena, hematochezia, or change in bowel movements. Past Medical History:   Diagnosis Date    Colon polyps     Diabetes mellitus ([address]     FH: prostate cancer     GERD (gastroesophageal reflux disease)     History of DVT (deep vein thrombosis) 09/2012    Provoked following airplane ride home from [place]. Venous duplex scan showed acute nonocclusive DVT in left popliteal vein; acute thrombosis left gastocnemius and soleus vein.  Hypercholesterolemia     Hypertension     Thromboembolus ([address]     leg  years ago     Past Surgical History:   Procedure Laterality Date    HX GI      colonoscopy x 3    NJ EXCISION TUMOR SOFT TISSUE SHOULDER SUBQ 3+CM Right 06/30/2017    Dr. Stephen Aguilar     Current Outpatient Prescriptions   Medication Sig    metFORMIN (GLUCOPHAGE) 500 mg tablet Take 1 Tab by mouth two (2) times daily (with meals).  atorvastatin (LIPITOR) 10 mg tablet Take 1 Tab by mouth daily.  cholecalciferol (VITAMIN D3) 1,000 unit cap Take 1,000 Units by mouth daily. No current facility-administered medications for this visit.       Allergies and Intolerances:   No Known Allergies     Family History: His mother is alive and has DM. His father  of a stroke at age 80. He also had prostate cancer. His brother has prostate cancer metastatic to bone. Family History   Problem Relation Age of Onset    Diabetes Mother     Cancer Father     Stroke Father     Hypertension Father     Diabetes Brother      Social History:   He  reports that he quit smoking about 19 years ago. He has never used smokeless tobacco. He smoked 0.5 ppd for 20 years, stopping in . He is  and has one living son, and one son who is . 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 History   Alcohol Use No     Immunization History:  Immunization History   Administered Date(s) Administered    Pneumococcal Polysaccharide (PPSV-23) 2017    TD Vaccine 2007    Tdap 2017    Zoster Vaccine, Live 2013       Review of Systems:   As above included in HPI. Otherwise 11 point review of systems negative including constitutional, skin, HENT, eyes, respiratory, cardiovascular, gastrointestinal, genitourinary, musculoskeletal, endocrine, hematologic, allergy, and neurologic. Physical:   Vitals:   BP: 142/82  HR: 75  WT: 213 lb (96.6 kg)  BMI:  27.35 kg/m2    Exam:   Pt appears well; alert and oriented x 3; appropriate affect. HEENT: PERRLA, anicteric, oropharynx clear, no JVD, adenopathy or thyromegaly. No carotid bruits or radiated murmur. Lungs: clear to auscultation, no wheezes, rhonchi, or rales. Heart: regular rate and rhythm. No murmur, rubs, gallops  Abdomen: soft, nontender, nondistended, normal bowel sounds, no hepatosplenomegaly or masses. Extremities: without edema. Pulses 1-2+ bilaterally. Review of Data:  Labs:  No visits with results within 1 Month(s) from this visit.   Latest known visit with results is:    Hospital Outpatient Visit on 2018   Component Date Value Ref Range Status    Hemoglobin A1c 2018 7.0* 4.2 - 5.6 % Final    Est. average glucose 2018 154  mg/dL Final    LIPID PROFILE 01/12/2018        Final    Cholesterol, total 01/12/2018 188  <200 MG/DL Final    Triglyceride 01/12/2018 111  <150 MG/DL Final    HDL Cholesterol 01/12/2018 42  40 - 60 MG/DL Final    LDL, calculated 01/12/2018 123.8* 0 - 100 MG/DL Final    VLDL, calculated 01/12/2018 22.2  MG/DL Final    CHOL/HDL Ratio 01/12/2018 4.5  0 - 5.0   Final    Sodium 01/12/2018 141  136 - 145 mmol/L Final    Potassium 01/12/2018 4.0  3.5 - 5.5 mmol/L Final    Chloride 01/12/2018 107  100 - 108 mmol/L Final    CO2 01/12/2018 25  21 - 32 mmol/L Final    Anion gap 01/12/2018 9  3.0 - 18 mmol/L Final    Glucose 01/12/2018 124* 74 - 99 mg/dL Final    BUN 01/12/2018 18  7.0 - 18 MG/DL Final    Creatinine 01/12/2018 0.98  0.6 - 1.3 MG/DL Final    BUN/Creatinine ratio 01/12/2018 18  12 - 20   Final    GFR est AA 01/12/2018 >60  >60 ml/min/1.73m2 Final    GFR est non-AA 01/12/2018 >60  >60 ml/min/1.73m2 Final    Calcium 01/12/2018 9.0  8.5 - 10.1 MG/DL Final    Bilirubin, total 01/12/2018 0.7  0.2 - 1.0 MG/DL Final    ALT (SGPT) 01/12/2018 26  16 - 61 U/L Final    AST (SGOT) 01/12/2018 14* 15 - 37 U/L Final    Alk. phosphatase 01/12/2018 66  45 - 117 U/L Final    Protein, total 01/12/2018 7.0  6.4 - 8.2 g/dL Final    Albumin 01/12/2018 3.9  3.4 - 5.0 g/dL Final    Globulin 01/12/2018 3.1  2.0 - 4.0 g/dL Final    A-G Ratio 01/12/2018 1.3  0.8 - 1.7   Final     EKG (10/12/2017) sinus bradycardia at 57 bpm. First degree AV block with TX 0.21. No ischemic changes. (Prolonged TX new from 8/2014; otherwise unchanged). Health Maintenance:  Screening:    Colorectal: colonoscopy (3/2017) tubular adenoma. Dr. Amey Krabbe. Due 2022.    Depression: none   DM (HbA1c/FPG): HbA1c 7.0 (1/2018)   Hepatitis C: negative (2/2016)   Falls: none   DEXA: N/A   PSA/ANU: PSA 1.2/ ANU (7/2017)   Glaucoma: regular eye exams with Dr. Dunaway [facility] (last 1/2018)   Smoking: distant past   Vitamin D: 32.4 (7/2017)   Medicare Wellness: 10/12/2017    Impression:  Patient Active Problem List   Diagnosis Code    Hyperlipidemia E78.5    Family history of prostate cancer Z80.42    Colon polyps K63.5    Gastric ulcer K25.9    Deep vein thrombosis (DVT); left popliteal, gastrocnemius, soleus veins 9/2012 I82.432    Diabetes mellitus ([facility] E11.9    Essential hypertension I10    GERD (gastroesophageal reflux disease) K21.9    Vitamin D insufficiency E55.9    Advance directive discussed with patient Z71.81       Plan:  1. Diabetes mellitus. HbA1c remains elevated at 7.0. Gained another four pounds since last visit, for a total of 21 pounds since retiring last year. Metformin  mg daily with dinner started at last visit, but patient reporting that the pill is too large to take so he has not been taking it. Will change to immediate release metformin 500 mg with breakfast and dinner. Stressed importance of dietary changes and weight loss. Also encouraged to resume exercising. Did meet with diabetes educator for assistance with follow-up scheduled for 3/2018. No current evidence of microvascular complications. Urine microalbumin/ creatinine ratio without evidence of microalbuminuria, and foot exam normal in 7/2017. On statin, but not on Ace-I or ARB. Will consider starting at next visit as blood pressure tolerates. Continue follow-up for annual eye exams. Emphasized importance of lifestyle modifications, including diet, exercise, and weight loss. Recheck HbA1c in 3 months. 2. Hypertension. Elevated today in office, although reports well controlled at home without medication. If elevation persists, will consider beginning Ace-I or ARB for renoprotective effect. Renal function remains normal with creatinine 0.98/ eGFR >60. Continue to follow. Abdominal ultrasound negative for aneurysm (10/2017). 3. Hyperlipidemia. On low intensity dose simvastatin with LDL remaining elevated at 123, indicative of poor control.  Improved only slightly with increase in dose of simvastatin to 20 mg daily. Will change to Lipitor 10 mg daily. Emphasized importance of lifestyle modifications, including diet, exercise, and weight loss. Recheck lipid panel in 3 months. 4. Right upper arm mass, s/p excisional biopsy. Pathology consistent with epidermal inclusion cyst. No further intervention needed. 5. H/O provoked DVT. No evidence of recurrence since 2012. Continue to follow. 6. GERD. Symptomatic control with over the counter antacids. Follow. 7. Health maintenance. Patient unwilling to receive influenza vaccine. Already received pneumovax. Will give Prevnar in 7/2018. Other immunizations up to date. Colonoscopy due 2022. Prostate cancer screening up to date. Continue annual eye exams. Will request report. Vitamin D level normal. Continue maintenance dose supplement. Welcome to Medicare visit completed. Patient understands recommendations and agrees with plan. Follow-up in 3 months.

## 2018-03-20 ENCOUNTER — HOSPITAL ENCOUNTER (OUTPATIENT)
Dept: NUTRITION | Age: 68
Discharge: HOME | End: 2018-03-20
Payer: MEDICARE

## 2018-03-20 PROCEDURE — 97803 MED NUTRITION INDIV SUBSEQ: CPT

## 2018-03-20 NOTE — PROGRESS NOTES
NUTRITION - FOLLOW-UP TREATMENT NOTE  Patient Name: Elise Malik         Date: 3/20/2018  : 1950    YES Patient  Verified  Diagnosis: T2DM   In time:   930             Out time:   1000   Total Treatment Time (min):   30     SUBJECTIVE/ASSESSMENT  Changes in medication or medical history? Any new allergies, surgeries or procedures? NO    If yes, update Summary List   Pt reports increased physical activity, daily walking 8-10,000 steps. He has been trying to eat consistently throughout the day, but does not always eat lunch if he has breakfast. For breakfast, he eats mostly carbs (oatmeal, raisin bread, orange juice). Discussed the importance of balancing carbs with protein at every meal to better manage BG. Pt verbalized understanding. Addressed Pt's questions and provided support towards healthier lifestyle changes. Will continue to follow. Current Wt: 212.6 Previous Wt: 213.2 Wt Change: -1     Achievement of Goals: 1. Eat consistently throughout the day, every 3-5 hours, include afternoon snack. Protein at every meal/snack. =not met, continue  2. Exercise: Walk on treadmill 3-4 days/week for 5-10 minutes 2 times/day.=met, continue  New Patient Goals:  1. Eat breakfast, lunch,dinner spaced about 3-5 hours apart. Include protein at each meal.      Patient Education:  [x]  Review current plan with patient   []  Other:    Handouts/  Information Provided: [x]  Carbohydrates  []  Protein  []  Fiber  []  Serving Sizes  []  Fluids  []  General guidelines []  Diabetes  []  Cholesterol  []  Sodium  []  SBGM  []  Food Journals  [x]  Others: Breakfasts     PLAN  []  Continue on current plan []  Follow-up PRN   []  Discharge due to :    [x]  Next appt:  May 21 at 0930     Dietitian: Mauro Alfaro RDN    Date: 3/20/2018 Time: 12:27 PM

## 2018-03-23 RX ORDER — METFORMIN HYDROCHLORIDE 500 MG/1
500 TABLET ORAL 2 TIMES DAILY WITH MEALS
Qty: 180 TAB | Refills: 2 | Status: SHIPPED | OUTPATIENT
Start: 2018-03-23 | End: 2018-12-17 | Stop reason: SDUPTHER

## 2018-03-23 RX ORDER — ATORVASTATIN CALCIUM 10 MG/1
10 TABLET, FILM COATED ORAL DAILY
Qty: 90 TAB | Refills: 2 | Status: SHIPPED | OUTPATIENT
Start: 2018-03-23 | End: 2018-05-31 | Stop reason: SDUPTHER

## 2018-03-23 NOTE — TELEPHONE ENCOUNTER
Yonatan Greenfield wrote him some prescriptions last time and they are working alright he is not having any problems.  Would like to get refills on these 2

## 2018-03-23 NOTE — TELEPHONE ENCOUNTER
The patient requests to have the following medications through mail order. Last Visit: 02/22/2018 with MD Silvina Cummings    Next Appointment: 05/31/2018 with MD Silvina Cummings     Requested Prescriptions     Pending Prescriptions Disp Refills    metFORMIN (GLUCOPHAGE) 500 mg tablet 180 Tab 2     Sig: Take 1 Tab by mouth two (2) times daily (with meals).  atorvastatin (LIPITOR) 10 mg tablet 90 Tab 1     Sig: Take 1 Tab by mouth daily.

## 2018-05-21 ENCOUNTER — HOSPITAL ENCOUNTER (OUTPATIENT)
Dept: NUTRITION | Age: 68
Discharge: HOME | End: 2018-05-21
Payer: MEDICARE

## 2018-05-21 PROCEDURE — 97803 MED NUTRITION INDIV SUBSEQ: CPT

## 2018-05-21 NOTE — PROGRESS NOTES
NUTRITION - FOLLOW-UP TREATMENT NOTE  Patient Name: Patrick Second         Date: 2018  : 1950    YES Patient  Verified  Diagnosis: DM2   In time:   930             Out time:   1000   Total Treatment Time (min):   30     SUBJECTIVE/ASSESSMENT  Changes in medication or medical history? Any new allergies, surgeries or procedures? NO    If yes, update Summary List    Pt doing well with implementing his goals and being more consistent with including protein at each meal. He has been traveling for the past three weeks, noting he \"splurged\" a bit on the foods he was eating. Pt has not been walking as regularly, admitting to laziness and lack of motivation to get up and move. Discussed the role regular exercise/movement plays in BG management and overall health. Pt would like to begin walking again, knowing a little is better than nothing. Pt will benefit from one more nutrition follow up per insurance coverage to review progress, address questions, and provide support for DM management to aid quality of life. Will follow. Current Wt: 209.4 Previous Wt: 212.6 Wt Change: -3.2     Achievement of Goals: 1. Eat breakfast, lunch,dinner spaced about 3-5 hours apart. Include protein at each meal.=continue  New Patient Goals:  1. Include protein at each meal.  2. Walk for 20-30 minutes every other day, either in morning or late evening.      Patient Education:  [x]  Review current plan with patient   []  Other:    Handouts/  Information Provided: []  Carbohydrates  []  Protein  []  Fiber  []  Serving Sizes  []  Fluids  []  General guidelines []  Diabetes  []  Cholesterol  []  Sodium  []  SBGM  []  Food Journals  []  Others:      PLAN  []  Continue on current plan []  Follow-up PRN   []  Discharge due to :    [x]  Next appt: Sept 10 at 0930     Dietitian: Chelo Martin RDN    Date: 2018 Time: 9:59 AM

## 2018-05-22 ENCOUNTER — HOSPITAL ENCOUNTER (OUTPATIENT)
Dept: LAB | Age: 68
Discharge: HOME | End: 2018-05-22
Payer: MEDICARE

## 2018-05-22 ENCOUNTER — APPOINTMENT (OUTPATIENT)
Dept: INTERNAL MEDICINE CLINIC | Age: 68
End: 2018-05-22

## 2018-05-22 DIAGNOSIS — I10 ESSENTIAL HYPERTENSION: ICD-10-CM

## 2018-05-22 DIAGNOSIS — E11.9 TYPE 2 DIABETES MELLITUS WITHOUT COMPLICATION, WITHOUT LONG-TERM CURRENT USE OF INSULIN (HCC): ICD-10-CM

## 2018-05-22 DIAGNOSIS — E78.5 HYPERLIPIDEMIA, UNSPECIFIED HYPERLIPIDEMIA TYPE: ICD-10-CM

## 2018-05-22 LAB
ALBUMIN SERPL-MCNC: 3.9 G/DL (ref 3.4–5)
ALBUMIN/GLOB SERPL: 1.4 {RATIO} (ref 0.8–1.7)
ALP SERPL-CCNC: 75 U/L (ref 45–117)
ALT SERPL-CCNC: 37 U/L (ref 16–61)
ANION GAP SERPL CALC-SCNC: 7 MMOL/L (ref 3–18)
AST SERPL-CCNC: 23 U/L (ref 15–37)
BILIRUB SERPL-MCNC: 0.7 MG/DL (ref 0.2–1)
BUN SERPL-MCNC: 16 MG/DL (ref 7–18)
BUN/CREAT SERPL: 15 (ref 12–20)
CALCIUM SERPL-MCNC: 9.3 MG/DL (ref 8.5–10.1)
CHLORIDE SERPL-SCNC: 109 MMOL/L (ref 100–108)
CHOLEST SERPL-MCNC: 167 MG/DL
CO2 SERPL-SCNC: 27 MMOL/L (ref 21–32)
CREAT SERPL-MCNC: 1.08 MG/DL (ref 0.6–1.3)
EST. AVERAGE GLUCOSE BLD GHB EST-MCNC: 148 MG/DL
GLOBULIN SER CALC-MCNC: 2.8 G/DL (ref 2–4)
GLUCOSE SERPL-MCNC: 123 MG/DL (ref 74–99)
HBA1C MFR BLD: 6.8 % (ref 4.2–5.6)
HDLC SERPL-MCNC: 44 MG/DL (ref 40–60)
HDLC SERPL: 3.8 {RATIO} (ref 0–5)
LDLC SERPL CALC-MCNC: 105 MG/DL (ref 0–100)
LIPID PROFILE,FLP: ABNORMAL
POTASSIUM SERPL-SCNC: 4.5 MMOL/L (ref 3.5–5.5)
PROT SERPL-MCNC: 6.7 G/DL (ref 6.4–8.2)
SODIUM SERPL-SCNC: 143 MMOL/L (ref 136–145)
TRIGL SERPL-MCNC: 90 MG/DL (ref ?–150)
VLDLC SERPL CALC-MCNC: 18 MG/DL

## 2018-05-22 PROCEDURE — 83036 HEMOGLOBIN GLYCOSYLATED A1C: CPT | Performed by: INTERNAL MEDICINE

## 2018-05-22 PROCEDURE — 80061 LIPID PANEL: CPT | Performed by: INTERNAL MEDICINE

## 2018-05-22 PROCEDURE — 36415 COLL VENOUS BLD VENIPUNCTURE: CPT | Performed by: INTERNAL MEDICINE

## 2018-05-22 PROCEDURE — 80053 COMPREHEN METABOLIC PANEL: CPT | Performed by: INTERNAL MEDICINE

## 2018-05-31 ENCOUNTER — HOSPITAL ENCOUNTER (OUTPATIENT)
Dept: LAB | Age: 68
Discharge: HOME | End: 2018-05-31
Payer: MEDICARE

## 2018-05-31 ENCOUNTER — OFFICE VISIT (OUTPATIENT)
Dept: INTERNAL MEDICINE CLINIC | Age: 68
End: 2018-05-31

## 2018-05-31 VITALS
DIASTOLIC BLOOD PRESSURE: 88 MMHG | OXYGEN SATURATION: 98 % | SYSTOLIC BLOOD PRESSURE: 116 MMHG | TEMPERATURE: 98.2 F | HEIGHT: 74 IN | HEART RATE: 67 BPM | RESPIRATION RATE: 16 BRPM | BODY MASS INDEX: 26.44 KG/M2 | WEIGHT: 206 LBS

## 2018-05-31 DIAGNOSIS — K62.5 RECTAL BLEEDING: ICD-10-CM

## 2018-05-31 DIAGNOSIS — I82.432 DEEP VEIN THROMBOSIS (DVT) OF POPLITEAL VEIN OF LEFT LOWER EXTREMITY, UNSPECIFIED CHRONICITY (HCC): ICD-10-CM

## 2018-05-31 DIAGNOSIS — K62.5 RECTAL BLEEDING: Primary | ICD-10-CM

## 2018-05-31 DIAGNOSIS — E78.5 HYPERLIPIDEMIA, UNSPECIFIED HYPERLIPIDEMIA TYPE: ICD-10-CM

## 2018-05-31 DIAGNOSIS — E55.9 VITAMIN D INSUFFICIENCY: ICD-10-CM

## 2018-05-31 DIAGNOSIS — E11.9 TYPE 2 DIABETES MELLITUS WITHOUT COMPLICATION, WITHOUT LONG-TERM CURRENT USE OF INSULIN (HCC): ICD-10-CM

## 2018-05-31 DIAGNOSIS — Z80.42 FAMILY HISTORY OF PROSTATE CANCER: ICD-10-CM

## 2018-05-31 DIAGNOSIS — Z12.5 PROSTATE CANCER SCREENING: ICD-10-CM

## 2018-05-31 DIAGNOSIS — K21.9 GASTROESOPHAGEAL REFLUX DISEASE, ESOPHAGITIS PRESENCE NOT SPECIFIED: ICD-10-CM

## 2018-05-31 DIAGNOSIS — I10 ESSENTIAL HYPERTENSION: ICD-10-CM

## 2018-05-31 LAB
BASOPHILS # BLD: 0 K/UL (ref 0–0.06)
BASOPHILS NFR BLD: 0 % (ref 0–2)
DIFFERENTIAL METHOD BLD: ABNORMAL
EOSINOPHIL # BLD: 0 K/UL (ref 0–0.4)
EOSINOPHIL NFR BLD: 1 % (ref 0–5)
ERYTHROCYTE [DISTWIDTH] IN BLOOD BY AUTOMATED COUNT: 13.4 % (ref 11.6–14.5)
FERRITIN SERPL-MCNC: 144 NG/ML (ref 8–388)
HCT VFR BLD AUTO: 41.4 % (ref 36–48)
HGB BLD-MCNC: 13.3 G/DL (ref 13–16)
IRON SATN MFR SERPL: 20 %
IRON SERPL-MCNC: 62 UG/DL (ref 50–175)
LYMPHOCYTES # BLD: 1.5 K/UL (ref 0.9–3.6)
LYMPHOCYTES NFR BLD: 42 % (ref 21–52)
MCH RBC QN AUTO: 26.7 PG (ref 24–34)
MCHC RBC AUTO-ENTMCNC: 32.1 G/DL (ref 31–37)
MCV RBC AUTO: 83 FL (ref 74–97)
MONOCYTES # BLD: 0.3 K/UL (ref 0.05–1.2)
MONOCYTES NFR BLD: 8 % (ref 3–10)
NEUTS SEG # BLD: 1.7 K/UL (ref 1.8–8)
NEUTS SEG NFR BLD: 49 % (ref 40–73)
PLATELET # BLD AUTO: 203 K/UL (ref 135–420)
PMV BLD AUTO: 10.2 FL (ref 9.2–11.8)
RBC # BLD AUTO: 4.99 M/UL (ref 4.7–5.5)
TIBC SERPL-MCNC: 310 UG/DL (ref 250–450)
WBC # BLD AUTO: 3.5 K/UL (ref 4.6–13.2)

## 2018-05-31 PROCEDURE — 82728 ASSAY OF FERRITIN: CPT | Performed by: INTERNAL MEDICINE

## 2018-05-31 PROCEDURE — 83540 ASSAY OF IRON: CPT | Performed by: INTERNAL MEDICINE

## 2018-05-31 PROCEDURE — 85025 COMPLETE CBC W/AUTO DIFF WBC: CPT | Performed by: INTERNAL MEDICINE

## 2018-05-31 RX ORDER — ATORVASTATIN CALCIUM 20 MG/1
20 TABLET, FILM COATED ORAL DAILY
Qty: 90 TAB | Refills: 2 | Status: SHIPPED | OUTPATIENT
Start: 2018-05-31 | End: 2019-03-17 | Stop reason: SDUPTHER

## 2018-05-31 NOTE — PROGRESS NOTES
Chief Complaint   Patient presents with    Diabetes     3 month follow up with labs     1. Have you been to the ER, urgent care clinic or hospitalized since your last visit? NO.     2. Have you seen or consulted any other health care providers outside of the [facility] since your last visit (Include any pap smears or colon screening)?  NO

## 2018-05-31 NOTE — PATIENT INSTRUCTIONS
Rectal Bleeding: Care Instructions  Your Care Instructions    Rectal bleeding in small amounts is common. You may see red spotting on toilet paper or drops of blood in the toilet. Rectal bleeding has many possible causes, from something as minor as hemorrhoids to something as serious as colon cancer. You may need more tests to find the cause of your bleeding. Follow-up care is a key part of your treatment and safety. Be sure to make and go to all appointments, and call your doctor if you are having problems. It's also a good idea to know your test results and keep a list of the medicines you take. How can you care for yourself at home? · Avoid aspirin and other nonsteroidal anti-inflammatory drugs (NSAIDs), such as ibuprofen (Advil, Motrin) and naproxen (Aleve). They can cause you to bleed more. Ask your doctor if you can take acetaminophen (Tylenol). Read and follow all instructions on the label. · Use a stool softener that contains bran or psyllium. You can save money by buying bran or psyllium (available in bulk at most health food stores) and sprinkling it on foods or stirring it into fruit juice. You can also use a product such as Metamucil or Citrucel. · Take your medicines exactly as directed. Call your doctor if you think you are having a problem with your medicine. When should you call for help? Call 911 anytime you think you may need emergency care. For example, call if:  ? · You passed out (lost consciousness). ?Call your doctor now or seek immediate medical care if:  ? · You have new or worse pain. ? · You have new or worse bleeding from the rectum. ? · You are dizzy or light-headed, or you feel like you may faint. ? Watch closely for changes in your health, and be sure to contact your doctor if:  ? · You cannot pass stools or gas. ? · You do not get better as expected. Where can you learn more? Go to http://yanet-sergio.info/.   Enter C481 in the search box to learn more about \"Rectal Bleeding: Care Instructions. \"  Current as of: May 12, 2017  Content Version: 11.4  © 1211-3802 [employer]. Care instructions adapted under license by [employer] (which disclaims liability or warranty for this information). If you have questions about a medical condition or this instruction, always ask your healthcare professional. [address] any warranty or liability for your use of this information. Learning About Diabetes Food Guidelines  Your Care Instructions    Meal planning is important to manage diabetes. It helps keep your blood sugar at a target level (which you set with your doctor). You don't have to eat special foods. You can eat what your family eats, including sweets once in a while. But you do have to pay attention to how often you eat and how much you eat of certain foods. You may want to work with a dietitian or a certified diabetes educator (CDE) to help you plan meals and snacks. A dietitian or CDE can also help you lose weight if that is one of your goals. What should you know about eating carbs? Managing the amount of carbohydrate (carbs) you eat is an important part of healthy meals when you have diabetes. Carbohydrate is found in many foods. · Learn which foods have carbs. And learn the amounts of carbs in different foods. ¨ Bread, cereal, pasta, and rice have about 15 grams of carbs in a serving. A serving is 1 slice of bread (1 ounce), ½ cup of cooked cereal, or 1/3 cup of cooked pasta or rice. ¨ Fruits have 15 grams of carbs in a serving. A serving is 1 small fresh fruit, such as an apple or orange; ½ of a banana; ½ cup of cooked or canned fruit; ½ cup of fruit juice; 1 cup of melon or raspberries; or 2 tablespoons of dried fruit. ¨ Milk and no-sugar-added yogurt have 15 grams of carbs in a serving. A serving is 1 cup of milk or 2/3 cup of no-sugar-added yogurt.   ¨ Starchy vegetables have 15 grams of carbs in a serving. A serving is ½ cup of mashed potatoes or sweet potato; 1 cup winter squash; ½ of a small baked potato; ½ cup of cooked beans; or ½ cup cooked corn or green peas. · Learn how much carbs to eat each day and at each meal. A dietitian or CDE can teach you how to keep track of the amount of carbs you eat. This is called carbohydrate counting. · If you are not sure how to count carbohydrate grams, use the Plate Method to plan meals. It is a good, quick way to make sure that you have a balanced meal. It also helps you spread carbs throughout the day. ¨ Divide your plate by types of foods. Put non-starchy vegetables on half the plate, meat or other protein food on one-quarter of the plate, and a grain or starchy vegetable in the final quarter of the plate. To this you can add a small piece of fruit and 1 cup of milk or yogurt, depending on how many carbs you are supposed to eat at a meal.  · Try to eat about the same amount of carbs at each meal. Do not \"save up\" your daily allowance of carbs to eat at one meal.  · Proteins have very little or no carbs per serving. Examples of proteins are beef, chicken, turkey, fish, eggs, tofu, cheese, cottage cheese, and peanut butter. A serving size of meat is 3 ounces, which is about the size of a deck of cards. Examples of meat substitute serving sizes (equal to 1 ounce of meat) are 1/4 cup of cottage cheese, 1 egg, 1 tablespoon of peanut butter, and ½ cup of tofu. How can you eat out and still eat healthy? · Learn to estimate the serving sizes of foods that have carbohydrate. If you measure food at home, it will be easier to estimate the amount in a serving of restaurant food. · If the meal you order has too much carbohydrate (such as potatoes, corn, or baked beans), ask to have a low-carbohydrate food instead. Ask for a salad or green vegetables.   · If you use insulin, check your blood sugar before and after eating out to help you plan how much to eat in the future. · If you eat more carbohydrate at a meal than you had planned, take a walk or do other exercise. This will help lower your blood sugar. What else should you know? · Limit saturated fat, such as the fat from meat and dairy products. This is a healthy choice because people who have diabetes are at higher risk of heart disease. So choose lean cuts of meat and nonfat or low-fat dairy products. Use olive or canola oil instead of butter or shortening when cooking. · Don't skip meals. Your blood sugar may drop too low if you skip meals and take insulin or certain medicines for diabetes. · Check with your doctor before you drink alcohol. Alcohol can cause your blood sugar to drop too low. Alcohol can also cause a bad reaction if you take certain diabetes medicines. Follow-up care is a key part of your treatment and safety. Be sure to make and go to all appointments, and call your doctor if you are having problems. It's also a good idea to know your test results and keep a list of the medicines you take. Where can you learn more? Go to http://yanet-sergio.info/. Enter Y205 in the search box to learn more about \"Learning About Diabetes Food Guidelines. \"  Current as of: March 13, 2017  Content Version: 11.4  © 2225-6778 [employer]. Care instructions adapted under license by [employer] (which disclaims liability or warranty for this information). If you have questions about a medical condition or this instruction, always ask your healthcare professional. [place] [address] any warranty or liability for your use of this information.

## 2018-05-31 NOTE — MR AVS SNAPSHOT
[address] [facility] 
 
 
 [address], [address] [facility] 
[phone] Patient: Barbara Doshi MRN: [id number] Luz Ser Visit Information Date & Time Provider Department Dept. Phone Encounter #  
 5/31/2018 12:00 PM Lindy Ambriz MD [facility] [address] [place] [address] [id number] Follow-up Instructions Return in about 3 months (around 8/31/2018), or if symptoms worsen or fail to improve. Your Appointments 8/15/2018  9:00 AM  
LAB with [facility] NURSE VISIT [facility] [address] [place] [address] ([facility]) Appt Note: lab  
 [address], [address] Hallie Mortimer [address] [place]  
  
   
 [address], [address]  
  
    
 8/22/2018 10:00 AM  
Office Visit with Lindy Ambriz MD  
[facility] Appt Note: 3 month follow up  
 [address], [address] Hallie Mortimer [address] [place]  
  
   
 [address], [address] Upcoming Health Maintenance Date Due MICROALBUMIN Q1 7/5/2018 Pneumococcal 65+ Low/Medium Risk (1 of 2 - PCV13) 7/12/2018 FOOT EXAM Q1 7/12/2018 Influenza Age 5 to Adult 8/1/2018 MEDICARE YEARLY EXAM 10/13/2018 HEMOGLOBIN A1C Q6M 11/22/2018 EYE EXAM RETINAL OR DILATED Q1 1/24/2019 LIPID PANEL Q1 5/22/2019 GLAUCOMA SCREENING Q2Y 1/24/2020 COLONOSCOPY 3/20/2022 DTaP/Tdap/Td series (2 - Td) 1/4/2027 Allergies as of 5/31/2018  Review Complete On: 5/31/2018 By: Mitra Braxton No Known Allergies Current Immunizations  Reviewed on 7/12/2017 Name Date Pneumococcal Polysaccharide (PPSV-23) 7/12/2017  9:20 AM  
 TD Vaccine 9/7/2007 Tdap 1/4/2017 Zoster Vaccine, Live 7/30/2013 Not reviewed this visit You Were Diagnosed With   
  
 Codes Comments Rectal bleeding    -  Primary ICD-10-CM: K62.5 ICD-9-CM: 569.3 Vitals BP Pulse Temp Resp Height(growth percentile) Weight(growth percentile) 116/88 (BP 1 Location: Left arm, BP Patient Position: Sitting) 67 98.2 °F (36.8 °C) (Oral) 16 6' 2\" (1.88 m) 206 lb (93.4 kg) SpO2 BMI Smoking Status 98% 26.45 kg/m2 Former Smoker Vitals History BMI and BSA Data Body Mass Index Body Surface Area  
 26.45 kg/m 2 2.21 m 2 Preferred Pharmacy Pharmacy Name Phone [facility], [address] [facility] [address] [phone] Your Updated Medication List  
  
   
This list is accurate as of 5/31/18 12:53 PM.  Always use your most recent med list.  
  
  
  
  
 atorvastatin 20 mg tablet Commonly known as:  LIPITOR Take 1 Tab by mouth daily. cholecalciferol 1,000 unit Cap Commonly known as:  VITAMIN D3 Take 1,000 Units by mouth daily. metFORMIN 500 mg tablet Commonly known as:  GLUCOPHAGE Take 1 Tab by mouth two (2) times daily (with meals). Prescriptions Sent to Pharmacy Refills  
 atorvastatin (LIPITOR) 20 mg tablet 2 Sig: Take 1 Tab by mouth daily. Class: Normal  
 Pharmacy: [address], [address] [facility] Ph #: [phone] Route: Oral  
  
We Performed the Following REFERRAL TO GASTROENTEROLOGY [HXO13 Custom] Follow-up Instructions Return in about 3 months (around 8/31/2018), or if symptoms worsen or fail to improve. To-Do List   
 05/31/2018 Lab:  CBC WITH AUTOMATED DIFF   
  
 05/31/2018 Lab:  FERRITIN   
  
 05/31/2018 Lab:  IRON PROFILE   
  
 09/10/2018 9:30 AM  
  Appointment with Jay Jay Cain at [facility] Referral Information Referral ID Referred By Referred To  
  
 [id number] You Garces MD   
   [facility] [address] [place], [address] Phone: [phone] Fax: [phone] Visits Status Start Date End Date 1 New Request 5/31/18 5/31/19 If your referral has a status of pending review or denied, additional information will be sent to support the outcome of this decision. Patient Instructions Rectal Bleeding: Care Instructions Your Care Instructions Rectal bleeding in small amounts is common. You may see red spotting on toilet paper or drops of blood in the toilet. Rectal bleeding has many possible causes, from something as minor as hemorrhoids to something as serious as colon cancer. You may need more tests to find the cause of your bleeding. Follow-up care is a key part of your treatment and safety. Be sure to make and go to all appointments, and call your doctor if you are having problems. It's also a good idea to know your test results and keep a list of the medicines you take. How can you care for yourself at home? · Avoid aspirin and other nonsteroidal anti-inflammatory drugs (NSAIDs), such as ibuprofen (Advil, Motrin) and naproxen (Aleve). They can cause you to bleed more. Ask your doctor if you can take acetaminophen (Tylenol). Read and follow all instructions on the label. · Use a stool softener that contains bran or psyllium. You can save money by buying bran or psyllium (available in bulk at most health food stores) and sprinkling it on foods or stirring it into fruit juice. You can also use a product such as Metamucil or Citrucel. · Take your medicines exactly as directed. Call your doctor if you think you are having a problem with your medicine. When should you call for help? Call 911 anytime you think you may need emergency care. For example, call if: 
? · You passed out (lost consciousness). ?Call your doctor now or seek immediate medical care if: 
? · You have new or worse pain. ? · You have new or worse bleeding from the rectum. ? · You are dizzy or light-headed, or you feel like you may faint. ? Watch closely for changes in your health, and be sure to contact your doctor if: 
? · You cannot pass stools or gas. ? · You do not get better as expected. Where can you learn more? Go to http://yanet-sergio.info/. Enter L786 in the search box to learn more about \"Rectal Bleeding: Care Instructions. \" Current as of: May 12, 2017 Content Version: 11.4 © 4499-3807 [employer]. Care instructions adapted under license by [employer] (which disclaims liability or warranty for this information). If you have questions about a medical condition or this instruction, always ask your healthcare professional. [place] [address] any warranty or liability for your use of this information. Learning About Diabetes Food Guidelines Your Care Instructions Meal planning is important to manage diabetes. It helps keep your blood sugar at a target level (which you set with your doctor). You don't have to eat special foods. You can eat what your family eats, including sweets once in a while. But you do have to pay attention to how often you eat and how much you eat of certain foods. You may want to work with a dietitian or a certified diabetes educator (CDE) to help you plan meals and snacks. A dietitian or CDE can also help you lose weight if that is one of your goals. What should you know about eating carbs? Managing the amount of carbohydrate (carbs) you eat is an important part of healthy meals when you have diabetes. Carbohydrate is found in many foods. · Learn which foods have carbs. And learn the amounts of carbs in different foods. ¨ Bread, cereal, pasta, and rice have about 15 grams of carbs in a serving. A serving is 1 slice of bread (1 ounce), ½ cup of cooked cereal, or 1/3 cup of cooked pasta or rice. ¨ Fruits have 15 grams of carbs in a serving.  A serving is 1 small fresh fruit, such as an apple or orange; ½ of a banana; ½ cup of cooked or canned fruit; ½ cup of fruit juice; 1 cup of melon or raspberries; or 2 tablespoons of dried fruit. ¨ Milk and no-sugar-added yogurt have 15 grams of carbs in a serving. A serving is 1 cup of milk or 2/3 cup of no-sugar-added yogurt. ¨ Starchy vegetables have 15 grams of carbs in a serving. A serving is ½ cup of mashed potatoes or sweet potato; 1 cup winter squash; ½ of a small baked potato; ½ cup of cooked beans; or ½ cup cooked corn or green peas. · Learn how much carbs to eat each day and at each meal. A dietitian or CDE can teach you how to keep track of the amount of carbs you eat. This is called carbohydrate counting. · If you are not sure how to count carbohydrate grams, use the Plate Method to plan meals. It is a good, quick way to make sure that you have a balanced meal. It also helps you spread carbs throughout the day. ¨ Divide your plate by types of foods. Put non-starchy vegetables on half the plate, meat or other protein food on one-quarter of the plate, and a grain or starchy vegetable in the final quarter of the plate. To this you can add a small piece of fruit and 1 cup of milk or yogurt, depending on how many carbs you are supposed to eat at a meal. 
· Try to eat about the same amount of carbs at each meal. Do not \"save up\" your daily allowance of carbs to eat at one meal. 
· Proteins have very little or no carbs per serving. Examples of proteins are beef, chicken, turkey, fish, eggs, tofu, cheese, cottage cheese, and peanut butter. A serving size of meat is 3 ounces, which is about the size of a deck of cards. Examples of meat substitute serving sizes (equal to 1 ounce of meat) are 1/4 cup of cottage cheese, 1 egg, 1 tablespoon of peanut butter, and ½ cup of tofu. How can you eat out and still eat healthy? · Learn to estimate the serving sizes of foods that have carbohydrate. If you measure food at home, it will be easier to estimate the amount in a serving of restaurant food.  
· If the meal you order has too much carbohydrate (such as potatoes, corn, or baked beans), ask to have a low-carbohydrate food instead. Ask for a salad or green vegetables. · If you use insulin, check your blood sugar before and after eating out to help you plan how much to eat in the future. · If you eat more carbohydrate at a meal than you had planned, take a walk or do other exercise. This will help lower your blood sugar. What else should you know? · Limit saturated fat, such as the fat from meat and dairy products. This is a healthy choice because people who have diabetes are at higher risk of heart disease. So choose lean cuts of meat and nonfat or low-fat dairy products. Use olive or canola oil instead of butter or shortening when cooking. · Don't skip meals. Your blood sugar may drop too low if you skip meals and take insulin or certain medicines for diabetes. · Check with your doctor before you drink alcohol. Alcohol can cause your blood sugar to drop too low. Alcohol can also cause a bad reaction if you take certain diabetes medicines. Follow-up care is a key part of your treatment and safety. Be sure to make and go to all appointments, and call your doctor if you are having problems. It's also a good idea to know your test results and keep a list of the medicines you take. Where can you learn more? Go to http://yanet-sergio.info/. Enter P184 in the search box to learn more about \"Learning About Diabetes Food Guidelines. \" Current as of: March 13, 2017 Content Version: 11.4 © 7654-8650 Healthwise, [employer]. Care instructions adapted under license by [employer] (which disclaims liability or warranty for this information). If you have questions about a medical condition or this instruction, always ask your healthcare professional. [place] [address] any warranty or liability for your use of this information. Introducing [facility]! Dear Daisy Arvizu: Thank you for requesting a LiveLeaf account. Our records indicate that you already have an active LiveLeaf account. You can access your account anytime at [email or web address] - Addiction Treatment Reviews & Information. [email or web address] - Addiction Treatment Reviews & Information Did you know that you can access your hospital and ER discharge instructions at any time in LiveLeaf? You can also review all of your test results from your hospital stay or ER visit. Additional Information If you have questions, please visit the Frequently Asked Questions section of the LiveLeaf website at [email or web address] - Addiction Treatment Reviews & Information. [email or web address] - Addiction Treatment Reviews & Information/. Remember, LiveLeaf is NOT to be used for urgent needs. For medical emergencies, dial 911. Now available from your iPhone and Android! Please provide this summary of care documentation to your next provider. Your primary care clinician is listed as Tra Oliveira. If you have any questions after today's visit, please call [phone].

## 2018-06-01 ENCOUNTER — TELEPHONE (OUTPATIENT)
Dept: INTERNAL MEDICINE CLINIC | Age: 68
End: 2018-06-01

## 2018-06-01 NOTE — TELEPHONE ENCOUNTER
Please let the patient know that his blood count was normal, without evidence of anemia, and his iron levels are also normal. Please emphasize that would still recommend follow-up with Dr. Felipe Palm to evaluate his rectal bleeding. Thanks.

## 2018-06-04 NOTE — PROGRESS NOTES
HPI:   Theresa Samaniego is a 74y.o. year old male who presents today for evaluation of prehypertension, hyperlipidemia, diabetes mellitus, prior provoked DVT, and GERD. He reports that he is doing relatively well. He states that he has been able to tolerate the IR form of metformin and has been compliant with taking it twice per day. He does report that he experienced two episodes of rectal bleeding with bowel movements. He states that in 4/2018, he noticed blood on the outside of his stool and in the toilet water during a bowel movement. He states that he was straining during the bowel movement, although reports that the stool was not especially hard. He denies any abdominal pain, melena, or change in caliber or character of his stool. His weight has decreased six pounds since his last visit in 2/2018, but he states that he has been trying to lose weight to help with his diabetes mellitus. He states that a second episode occurred in early 5/2018. He denies any fatigue, pallor, pica, or restless leg symptoms. His last colonoscopy was in 3/2017 by Dr. Rizwana Dunn. He reports that he is otherwise without complaints. He has a history of prehypertension, not treated with medication. He states that he continues to monitor his blood pressure at home and reports that it is ranging 129-135/ 70-80. He states that he is no longer walking for exercise, but states that he does do yard work. He denies any chest pain, shortness of breath at rest or with exertion, palpitations, lightheadedness, or edema. He also has a history of hyperlipidemia, treated with moderate intensity dose atorvastatin. He has a history of diabetes mellitus, recently diagnosed in 2/2016 with HbA1c of 6.5. Review of chart showed that fasting blood glucose has been elevated since 2010 ranging from 107-128. He was started on metformin in 11/2017, but had difficulty with the ER formulation due to the size of the pill.  He was changed to metformin IR in 2/2018 without further difficulties. He denies any polyuria, polydipsia, nocturia, or blurry vision, and has no history of retinopathy, neuropathy, or nephropathy. He has regular eye exams with Dr. Lenin Villatoro of [facility] on [address]. He has a history of a provoked DVT, occurring in 8/2012 following an airplane ride home from [place]. Venous duplex scan (9/1/2012) at [facility] showed an acute nonocclusive DVT in the left popliteal vein, and an acute thrombus in the left gastrocnemius and soleus veins. He was treated with Lovenox and transitioned to coumadin and completed three months of therapy. 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 He reports occasional mild swelling in his left foot, but denies any calf pain or tenderness, shortness of breath, or pleuritic chest pain. He has had multiple long plane rides since that time without recurrence. He had an excisional biopsy of a mass in his right posterior upper arm on 6/30/2017 by Dr. Michael Nagy. Pathology showed an epidermal inclusion cyst.     He has a history of GERD, but uses over the counter antacids occasionally. He also has a reported history of a gastric ulcer, but records are unavailable. He had a screening colonoscopy in 11/2011 by Dr. Robert Ambrocio showing mild diverticulosis, a 2 mm sessile polyp in the mid sigmoid colon (fulgurized), and a 5 mm polyp in the proximal sigmoid colon (pathology unavailable). He had another screening colonoscopy in 3/2017 by Dr. Robert Ambrocio showing a 3 mm sessile descending colon polyp (pathology: tubular adenoma). Follow-up recommended for five years. Past Medical History:   Diagnosis Date    Colon polyps     Diabetes mellitus ([address]     FH: prostate cancer     GERD (gastroesophageal reflux disease)     History of DVT (deep vein thrombosis) 09/2012    Provoked following airplane ride home from [place].  Venous duplex scan showed acute nonocclusive DVT in left popliteal vein; acute thrombosis left gastocnemius and soleus vein.  Hypercholesterolemia     Hypertension     Thromboembolus ([address]     leg  years ago     Past Surgical History:   Procedure Laterality Date    HX GI      colonoscopy x 3    ID EXCISION TUMOR SOFT TISSUE SHOULDER SUBQ 3+CM Right 2017    Dr. Cortez [place]     Current Outpatient Prescriptions   Medication Sig    atorvastatin (LIPITOR) 20 mg tablet Take 1 Tab by mouth daily.  metFORMIN (GLUCOPHAGE) 500 mg tablet Take 1 Tab by mouth two (2) times daily (with meals).  cholecalciferol (VITAMIN D3) 1,000 unit cap Take 1,000 Units by mouth daily. No current facility-administered medications for this visit. Allergies and Intolerances:   No Known Allergies     Family History: His mother is alive and has DM. His father  of a stroke at age 80. He also had prostate cancer. His brother has prostate cancer metastatic to bone. Family History   Problem Relation Age of Onset    Diabetes Mother     Cancer Father     Stroke Father     Hypertension Father     Diabetes Brother      Social History:   He  reports that he quit smoking about 19 years ago. He has never used smokeless tobacco. He smoked 0.5 ppd for 20 years, stopping in . He is  and has one living son, and one son who is . He recently retired from installing [employer] History   Alcohol Use No     Immunization History:  Immunization History   Administered Date(s) Administered    Pneumococcal Polysaccharide (PPSV-23) 2017    TD Vaccine 2007    Tdap 2017    Zoster Vaccine, Live 2013       Review of Systems:   As above included in HPI. Otherwise 11 point review of systems negative including constitutional, skin, HENT, eyes, respiratory, cardiovascular, gastrointestinal, genitourinary, musculoskeletal, endocrine, hematologic, allergy, and neurologic.     Physical:   Vitals:   BP: 116/88  HR: 67  WT: 206 lb (93.4 kg)  BMI:  26.45 kg/m2    Exam:   Pt appears well; alert and oriented x 3; appropriate affect. HEENT: PERRLA, anicteric, oropharynx clear, no JVD, adenopathy or thyromegaly. No carotid bruits or radiated murmur. Lungs: clear to auscultation, no wheezes, rhonchi, or rales. Heart: regular rate and rhythm. No murmur, rubs, gallops  Abdomen: soft, nontender, nondistended, normal bowel sounds, no hepatosplenomegaly or masses. Extremities: without edema. Pulses 1-2+ bilaterally. Review of Data:  Labs:  Hospital Outpatient Visit on 05/22/2018   Component Date Value Ref Range Status    Hemoglobin A1c 05/22/2018 6.8* 4.2 - 5.6 % Final    Est. average glucose 05/22/2018 148  mg/dL Final    LIPID PROFILE 05/22/2018        Final    Cholesterol, total 05/22/2018 167  <200 MG/DL Final    Triglyceride 05/22/2018 90  <150 MG/DL Final    HDL Cholesterol 05/22/2018 44  40 - 60 MG/DL Final    LDL, calculated 05/22/2018 105* 0 - 100 MG/DL Final    VLDL, calculated 05/22/2018 18  MG/DL Final    CHOL/HDL Ratio 05/22/2018 3.8  0 - 5.0   Final    Sodium 05/22/2018 143  136 - 145 mmol/L Final    Potassium 05/22/2018 4.5  3.5 - 5.5 mmol/L Final    Chloride 05/22/2018 109* 100 - 108 mmol/L Final    CO2 05/22/2018 27  21 - 32 mmol/L Final    Anion gap 05/22/2018 7  3.0 - 18 mmol/L Final    Glucose 05/22/2018 123* 74 - 99 mg/dL Final    BUN 05/22/2018 16  7.0 - 18 MG/DL Final    Creatinine 05/22/2018 1.08  0.6 - 1.3 MG/DL Final    BUN/Creatinine ratio 05/22/2018 15  12 - 20   Final    GFR est AA 05/22/2018 >60  >60 ml/min/1.73m2 Final    GFR est non-AA 05/22/2018 >60  >60 ml/min/1.73m2 Final    Calcium 05/22/2018 9.3  8.5 - 10.1 MG/DL Final    Bilirubin, total 05/22/2018 0.7  0.2 - 1.0 MG/DL Final    ALT (SGPT) 05/22/2018 37  16 - 61 U/L Final    AST (SGOT) 05/22/2018 23  15 - 37 U/L Final    Alk.  phosphatase 05/22/2018 75  45 - 117 U/L Final    Protein, total 05/22/2018 6.7  6.4 - 8.2 g/dL Final    Albumin 05/22/2018 3.9 3.4 - 5.0 g/dL Final    Globulin 05/22/2018 2.8  2.0 - 4.0 g/dL Final    A-G Ratio 05/22/2018 1.4  0.8 - 1.7   Final     EKG (10/12/2017) sinus bradycardia at 57 bpm. First degree AV block with PA 0.21. No ischemic changes. (Prolonged PA new from 8/2014; otherwise unchanged). Health Maintenance:  Screening:    Colorectal: colonoscopy (3/2017) tubular adenoma. Dr. Claudia Mckeon. Due 2022. Depression: none   DM (HbA1c/FPG): HbA1c 6.8 (5/2018)   Hepatitis C: negative (2/2016)   Falls: none   DEXA: N/A   PSA/ANU: PSA 1.2/ ANU (7/2017)   Glaucoma: regular eye exams with Dr. Smitha Bradley [facility] (last 1/2018)   Smoking: distant past   Vitamin D: 32.4 (7/2017)   Medicare Wellness: 10/12/2017    Impression:  Patient Active Problem List   Diagnosis Code    Hyperlipidemia E78.5    Family history of prostate cancer Z80.42    Colon polyps K63.5    Gastric ulcer K25.9    Deep vein thrombosis (DVT); left popliteal, gastrocnemius, soleus veins 9/2012 I82.432    Diabetes mellitus ([facility] [address] E11.9    Essential hypertension I10    GERD (gastroesophageal reflux disease) K21.9    Vitamin D insufficiency E55.9       Plan:  1. Diabetes mellitus. HbA1c improved to 6.8 with initiation of metformin  mg with breakfast and dinner. He has lost 7 pounds since his last visit (he had gained a total of 21 pounds since retiring last year). Has been meeting regularly with diabetes educator. No current evidence of microvascular complications. Urine microalbumin/ creatinine ratio without evidence of microalbuminuria, and foot exam normal in 7/2017. On statin, but not on Ace-I or ARB given now normotensive. Continue follow-up for annual eye exams. Emphasized importance of continued lifestyle modifications, including diet, exercise, and weight loss. Recheck HbA1c in 3 months. 2. Rectal bleeding. Two episodes in last month. Described as on outside of stool, so may be hemorrhoidal in source.  Recent colonoscopy in 3/2017 with only a single tubular adenoma removed. Will check CBC and iron studies today. Will also refer patient back to Dr. Denver Kay for evaluation of source. 3. Hypertension. Well controlled in office today. Renal function remains normal with creatinine 1.08/ eGFR >60. Continue to follow. Abdominal ultrasound negative for aneurysm (10/2017). 4. Hyperlipidemia. Previously on low intensity dose simvastatin with  at last visit. Changed to  Lipitor 10 mg daily with improvement to . Will increased dose to 20 mg daily. Emphasized importance of lifestyle modifications, including diet, exercise, and weight loss. Recheck lipid panel in 3 months. 5. Right upper arm mass, s/p excisional biopsy. Pathology consistent with epidermal inclusion cyst. No further intervention needed. 6. H/O provoked DVT. No evidence of recurrence since 2012. Continue to follow. 7. GERD. Symptomatic control with over the counter antacids. Follow. 8. Health maintenance. Discussed Shingrix vaccine and will think about it. Already received pneumovax. Will give Prevnar in 7/2018. Other immunizations up to date. Colonoscopy due 2022. Prostate cancer screening up to date. Continue annual eye exams. Vitamin D level normal. Continue maintenance dose supplement. Welcome to Medicare visit completed. Patient understands recommendations and agrees with plan.   Follow-up in 3 months for physical.

## 2018-08-15 ENCOUNTER — HOSPITAL ENCOUNTER (OUTPATIENT)
Dept: LAB | Age: 68
Discharge: HOME | End: 2018-08-15
Payer: MEDICARE

## 2018-08-15 ENCOUNTER — APPOINTMENT (OUTPATIENT)
Dept: INTERNAL MEDICINE CLINIC | Age: 68
End: 2018-08-15

## 2018-08-15 DIAGNOSIS — E11.9 TYPE 2 DIABETES MELLITUS WITHOUT COMPLICATION, WITHOUT LONG-TERM CURRENT USE OF INSULIN (HCC): ICD-10-CM

## 2018-08-15 DIAGNOSIS — Z12.5 PROSTATE CANCER SCREENING: ICD-10-CM

## 2018-08-15 DIAGNOSIS — I10 ESSENTIAL HYPERTENSION: ICD-10-CM

## 2018-08-15 DIAGNOSIS — E55.9 VITAMIN D INSUFFICIENCY: ICD-10-CM

## 2018-08-15 DIAGNOSIS — E78.5 HYPERLIPIDEMIA, UNSPECIFIED HYPERLIPIDEMIA TYPE: ICD-10-CM

## 2018-08-15 LAB
ALBUMIN SERPL-MCNC: 3.8 G/DL (ref 3.4–5)
ALBUMIN/GLOB SERPL: 1.3 {RATIO} (ref 0.8–1.7)
ALP SERPL-CCNC: 70 U/L (ref 45–117)
ALT SERPL-CCNC: 22 U/L (ref 16–61)
ANION GAP SERPL CALC-SCNC: 5 MMOL/L (ref 3–18)
APPEARANCE UR: CLEAR
AST SERPL-CCNC: 15 U/L (ref 15–37)
BACTERIA URNS QL MICRO: NEGATIVE /HPF
BASOPHILS # BLD: 0 K/UL (ref 0–0.1)
BASOPHILS NFR BLD: 0 % (ref 0–2)
BILIRUB SERPL-MCNC: 0.5 MG/DL (ref 0.2–1)
BILIRUB UR QL: NEGATIVE
BUN SERPL-MCNC: 21 MG/DL (ref 7–18)
BUN/CREAT SERPL: 19 (ref 12–20)
CALCIUM SERPL-MCNC: 9.3 MG/DL (ref 8.5–10.1)
CHLORIDE SERPL-SCNC: 108 MMOL/L (ref 100–108)
CHOLEST SERPL-MCNC: 152 MG/DL
CO2 SERPL-SCNC: 30 MMOL/L (ref 21–32)
COLOR UR: YELLOW
CREAT SERPL-MCNC: 1.13 MG/DL (ref 0.6–1.3)
DIFFERENTIAL METHOD BLD: NORMAL
EOSINOPHIL # BLD: 0 K/UL (ref 0–0.4)
EOSINOPHIL NFR BLD: 1 % (ref 0–5)
EPITH CASTS URNS QL MICRO: ABNORMAL /LPF (ref 0–5)
ERYTHROCYTE [DISTWIDTH] IN BLOOD BY AUTOMATED COUNT: 13.6 % (ref 11.6–14.5)
EST. AVERAGE GLUCOSE BLD GHB EST-MCNC: 148 MG/DL
GLOBULIN SER CALC-MCNC: 3 G/DL (ref 2–4)
GLUCOSE SERPL-MCNC: 114 MG/DL (ref 74–99)
GLUCOSE UR STRIP.AUTO-MCNC: NEGATIVE MG/DL
HBA1C MFR BLD: 6.8 % (ref 4.2–5.6)
HCT VFR BLD AUTO: 41.5 % (ref 36–48)
HDLC SERPL-MCNC: 46 MG/DL (ref 40–60)
HDLC SERPL: 3.3 {RATIO} (ref 0–5)
HGB BLD-MCNC: 13.1 G/DL (ref 13–16)
HGB UR QL STRIP: NEGATIVE
KETONES UR QL STRIP.AUTO: ABNORMAL MG/DL
LDLC SERPL CALC-MCNC: 86.2 MG/DL (ref 0–100)
LEUKOCYTE ESTERASE UR QL STRIP.AUTO: NEGATIVE
LIPID PROFILE,FLP: NORMAL
LYMPHOCYTES # BLD: 2.2 K/UL (ref 0.9–3.6)
LYMPHOCYTES NFR BLD: 48 % (ref 21–52)
MCH RBC QN AUTO: 27 PG (ref 24–34)
MCHC RBC AUTO-ENTMCNC: 31.6 G/DL (ref 31–37)
MCV RBC AUTO: 85.6 FL (ref 74–97)
MONOCYTES # BLD: 0.3 K/UL (ref 0.05–1.2)
MONOCYTES NFR BLD: 8 % (ref 3–10)
NEUTS SEG # BLD: 2 K/UL (ref 1.8–8)
NEUTS SEG NFR BLD: 43 % (ref 40–73)
NITRITE UR QL STRIP.AUTO: NEGATIVE
PH UR STRIP: 5 [PH] (ref 5–8)
PLATELET # BLD AUTO: 216 K/UL (ref 135–420)
PMV BLD AUTO: 10.9 FL (ref 9.2–11.8)
POTASSIUM SERPL-SCNC: 4.8 MMOL/L (ref 3.5–5.5)
PROT SERPL-MCNC: 6.8 G/DL (ref 6.4–8.2)
PROT UR STRIP-MCNC: NEGATIVE MG/DL
PSA SERPL-MCNC: 2.5 NG/ML (ref 0–4)
RBC # BLD AUTO: 4.85 M/UL (ref 4.7–5.5)
RBC #/AREA URNS HPF: 0 /HPF (ref 0–5)
SODIUM SERPL-SCNC: 143 MMOL/L (ref 136–145)
SP GR UR REFRACTOMETRY: 1.03 (ref 1–1.03)
TRIGL SERPL-MCNC: 99 MG/DL (ref ?–150)
TSH SERPL DL<=0.05 MIU/L-ACNC: 0.98 UIU/ML (ref 0.36–3.74)
UROBILINOGEN UR QL STRIP.AUTO: 0.2 EU/DL (ref 0.2–1)
VLDLC SERPL CALC-MCNC: 19.8 MG/DL
WBC # BLD AUTO: 4.6 K/UL (ref 4.6–13.2)
WBC URNS QL MICRO: 0 /HPF (ref 0–4)

## 2018-08-15 PROCEDURE — 82306 VITAMIN D 25 HYDROXY: CPT | Performed by: INTERNAL MEDICINE

## 2018-08-15 PROCEDURE — 82043 UR ALBUMIN QUANTITATIVE: CPT | Performed by: INTERNAL MEDICINE

## 2018-08-15 PROCEDURE — 80061 LIPID PANEL: CPT | Performed by: INTERNAL MEDICINE

## 2018-08-15 PROCEDURE — 81001 URINALYSIS AUTO W/SCOPE: CPT | Performed by: INTERNAL MEDICINE

## 2018-08-15 PROCEDURE — 84153 ASSAY OF PSA TOTAL: CPT | Performed by: INTERNAL MEDICINE

## 2018-08-15 PROCEDURE — 80053 COMPREHEN METABOLIC PANEL: CPT | Performed by: INTERNAL MEDICINE

## 2018-08-15 PROCEDURE — 36415 COLL VENOUS BLD VENIPUNCTURE: CPT | Performed by: INTERNAL MEDICINE

## 2018-08-15 PROCEDURE — 84443 ASSAY THYROID STIM HORMONE: CPT | Performed by: INTERNAL MEDICINE

## 2018-08-15 PROCEDURE — 85025 COMPLETE CBC W/AUTO DIFF WBC: CPT | Performed by: INTERNAL MEDICINE

## 2018-08-15 PROCEDURE — 83036 HEMOGLOBIN GLYCOSYLATED A1C: CPT | Performed by: INTERNAL MEDICINE

## 2018-08-16 LAB
25(OH)D3 SERPL-MCNC: 29.4 NG/ML (ref 30–100)
CREAT UR-MCNC: 215.71 MG/DL (ref 30–125)
MICROALBUMIN UR-MCNC: 0.9 MG/DL (ref 0–3)
MICROALBUMIN/CREAT UR-RTO: 4 MG/G (ref 0–30)

## 2018-08-22 ENCOUNTER — OFFICE VISIT (OUTPATIENT)
Dept: INTERNAL MEDICINE CLINIC | Age: 68
End: 2018-08-22

## 2018-08-22 VITALS
WEIGHT: 203 LBS | HEART RATE: 76 BPM | BODY MASS INDEX: 26.9 KG/M2 | HEIGHT: 73 IN | OXYGEN SATURATION: 96 % | RESPIRATION RATE: 14 BRPM | SYSTOLIC BLOOD PRESSURE: 124 MMHG | DIASTOLIC BLOOD PRESSURE: 82 MMHG | TEMPERATURE: 98.1 F

## 2018-08-22 DIAGNOSIS — E78.5 HYPERLIPIDEMIA, UNSPECIFIED HYPERLIPIDEMIA TYPE: ICD-10-CM

## 2018-08-22 DIAGNOSIS — E11.9 TYPE 2 DIABETES MELLITUS WITHOUT COMPLICATION, WITHOUT LONG-TERM CURRENT USE OF INSULIN (HCC): Primary | ICD-10-CM

## 2018-08-22 DIAGNOSIS — Z80.42 FAMILY HISTORY OF PROSTATE CANCER: ICD-10-CM

## 2018-08-22 DIAGNOSIS — D12.4 ADENOMATOUS POLYP OF DESCENDING COLON: ICD-10-CM

## 2018-08-22 DIAGNOSIS — R97.20 RISING PSA LEVEL: ICD-10-CM

## 2018-08-22 DIAGNOSIS — I10 ESSENTIAL HYPERTENSION: ICD-10-CM

## 2018-08-22 DIAGNOSIS — Z12.5 SCREENING PSA (PROSTATE SPECIFIC ANTIGEN): ICD-10-CM

## 2018-08-22 DIAGNOSIS — K21.9 GASTROESOPHAGEAL REFLUX DISEASE, ESOPHAGITIS PRESENCE NOT SPECIFIED: ICD-10-CM

## 2018-08-22 DIAGNOSIS — Z86.718 H/O DEEP VENOUS THROMBOSIS: ICD-10-CM

## 2018-08-22 RX ORDER — LANOLIN ALCOHOL/MO/W.PET/CERES
500 CREAM (GRAM) TOPICAL DAILY
COMMUNITY

## 2018-08-22 NOTE — PROGRESS NOTES
Chief Complaint   Patient presents with    Diabetes     Yearly physical with lab results. Health Maintenance Due   Topic Date Due    Pneumococcal 65+ Low/Medium Risk (1 of 2 - PCV13) 07/12/2018    FOOT EXAM Q1  07/12/2018    Influenza Age 5 to Adult  08/01/2018     1. Have you been to the ER, urgent care clinic or hospitalized since your last visit? NO.     2. Have you seen or consulted any other health care providers outside of the [address] [place] [address] since your last visit (Include any pap smears or colon screening)? NO      Do you have an Advanced Directive? NO    Would you like information on Advanced Directives? NO, patient states he is in the process of getting one.

## 2018-08-22 NOTE — MR AVS SNAPSHOT
[facility] 
 
 
 [address], [address] [facility] 
[phone] Patient: Katty Terry MRN: [id number] Arabella Hatch Visit Information Date & Time Provider Department Dept. Phone Encounter #  
 8/22/2018 10:00 AM Jes Patel MD [facility] [address] [place] [address] [phone] [id number] Follow-up Instructions Return in about 6 months (around 2/22/2019), or if symptoms worsen or fail to improve. Your Appointments 2/14/2019  8:45 AM  
LAB with [facility] NURSE VISIT [facility] [address] [place] [address] ([facility] Appt Note: labs [address], [id number] [place] [address] [address]  
  
   
 [address], [address]  
  
    
 2/21/2019  9:30 AM  
Office Visit with Jes Patel MD  
[facility]: ov 6mos. sarris [address], [address] [address] [address]  
  
   
 [address], [address] Upcoming Health Maintenance Date Due Pneumococcal 65+ Low/Medium Risk (1 of 2 - PCV13) 7/12/2018 Influenza Age 5 to Adult 8/1/2018 MEDICARE YEARLY EXAM 10/13/2018 EYE EXAM RETINAL OR DILATED Q1 1/24/2019 HEMOGLOBIN A1C Q6M 2/15/2019 MICROALBUMIN Q1 8/15/2019 LIPID PANEL Q1 8/15/2019 FOOT EXAM Q1 8/22/2019 GLAUCOMA SCREENING Q2Y 1/24/2020 COLONOSCOPY 3/20/2022 DTaP/Tdap/Td series (2 - Td) 1/4/2027 Allergies as of 8/22/2018  Review Complete On: 8/22/2018 By: Elmer Lazaro No Known Allergies Current Immunizations  Reviewed on 7/12/2017 Name Date Pneumococcal Polysaccharide (PPSV-23) 7/12/2017  9:20 AM  
 TD Vaccine 9/7/2007 Tdap 1/4/2017 Zoster Vaccine, Live 7/30/2013 Not reviewed this visit Vitals BP Pulse Temp Resp Height(growth percentile) Weight(growth percentile) 124/82 (BP 1 Location: Left arm, BP Patient Position: Sitting) 76 98.1 °F (36.7 °C) (Oral) 14 6' 1\" (1.854 m) 203 lb (92.1 kg) SpO2 BMI Smoking Status 96% 26.78 kg/m2 Former Smoker Vitals History BMI and BSA Data Body Mass Index Body Surface Area  
 26.78 kg/m 2 2.18 m 2 Preferred Pharmacy Pharmacy Name Phone [facility] [facility] [address] [phone] Your Updated Medication List  
  
   
This list is accurate as of 18 10:49 AM.  Always use your most recent med list.  
  
  
  
  
 atorvastatin 20 mg tablet Commonly known as:  LIPITOR Take 1 Tab by mouth daily. cholecalciferol 1,000 unit Cap Commonly known as:  VITAMIN D3 Take 1,000 Units by mouth daily. metFORMIN 500 mg tablet Commonly known as:  GLUCOPHAGE Take 1 Tab by mouth two (2) times daily (with meals). varicella-zoster recombinant (PF) 50 mcg/0.5 mL Susr injection Commonly known as:  SHINGRIX  
0.5 mL by IntraMUSCular route once for 1 dose. Repeat x 1 dose in 2-6 months VITAMIN B-12 1,000 mcg tablet Generic drug:  cyanocobalamin Take 1,000 mcg by mouth daily. Prescriptions Printed Refills  
 varicella-zoster recombinant, PF, (SHINGRIX) 50 mcg/0.5 mL susr injection 1 Si.5 mL by IntraMUSCular route once for 1 dose. Repeat x 1 dose in 2-6 months Class: Print Route: IntraMUSCular Follow-up Instructions Return in about 6 months (around 2019), or if symptoms worsen or fail to improve. To-Do List   
 2018 9:30 AM  
  Appointment with Angel Alexander at [address] [place] [address] Patient Instructions Learning About Diabetes Food Guidelines Your Care Instructions Meal planning is important to manage diabetes. It helps keep your blood sugar at a target level (which you set with your doctor).  You don't have to eat special foods. You can eat what your family eats, including sweets once in a while. But you do have to pay attention to how often you eat and how much you eat of certain foods. You may want to work with a dietitian or a certified diabetes educator (CDE) to help you plan meals and snacks. A dietitian or CDE can also help you lose weight if that is one of your goals. What should you know about eating carbs? Managing the amount of carbohydrate (carbs) you eat is an important part of healthy meals when you have diabetes. Carbohydrate is found in many foods. · Learn which foods have carbs. And learn the amounts of carbs in different foods. ¨ Bread, cereal, pasta, and rice have about 15 grams of carbs in a serving. A serving is 1 slice of bread (1 ounce), ½ cup of cooked cereal, or 1/3 cup of cooked pasta or rice. ¨ Fruits have 15 grams of carbs in a serving. A serving is 1 small fresh fruit, such as an apple or orange; ½ of a banana; ½ cup of cooked or canned fruit; ½ cup of fruit juice; 1 cup of melon or raspberries; or 2 tablespoons of dried fruit. ¨ Milk and no-sugar-added yogurt have 15 grams of carbs in a serving. A serving is 1 cup of milk or 2/3 cup of no-sugar-added yogurt. ¨ Starchy vegetables have 15 grams of carbs in a serving. A serving is ½ cup of mashed potatoes or sweet potato; 1 cup winter squash; ½ of a small baked potato; ½ cup of cooked beans; or ½ cup cooked corn or green peas. · Learn how much carbs to eat each day and at each meal. A dietitian or CDE can teach you how to keep track of the amount of carbs you eat. This is called carbohydrate counting. · If you are not sure how to count carbohydrate grams, use the Plate Method to plan meals. It is a good, quick way to make sure that you have a balanced meal. It also helps you spread carbs throughout the day. ¨ Divide your plate by types of foods.  Put non-starchy vegetables on half the plate, meat or other protein food on one-quarter of the plate, and a grain or starchy vegetable in the final quarter of the plate. To this you can add a small piece of fruit and 1 cup of milk or yogurt, depending on how many carbs you are supposed to eat at a meal. 
· Try to eat about the same amount of carbs at each meal. Do not \"save up\" your daily allowance of carbs to eat at one meal. 
· Proteins have very little or no carbs per serving. Examples of proteins are beef, chicken, turkey, fish, eggs, tofu, cheese, cottage cheese, and peanut butter. A serving size of meat is 3 ounces, which is about the size of a deck of cards. Examples of meat substitute serving sizes (equal to 1 ounce of meat) are 1/4 cup of cottage cheese, 1 egg, 1 tablespoon of peanut butter, and ½ cup of tofu. How can you eat out and still eat healthy? · Learn to estimate the serving sizes of foods that have carbohydrate. If you measure food at home, it will be easier to estimate the amount in a serving of restaurant food. · If the meal you order has too much carbohydrate (such as potatoes, corn, or baked beans), ask to have a low-carbohydrate food instead. Ask for a salad or green vegetables. · If you use insulin, check your blood sugar before and after eating out to help you plan how much to eat in the future. · If you eat more carbohydrate at a meal than you had planned, take a walk or do other exercise. This will help lower your blood sugar. What else should you know? · Limit saturated fat, such as the fat from meat and dairy products. This is a healthy choice because people who have diabetes are at higher risk of heart disease. So choose lean cuts of meat and nonfat or low-fat dairy products. Use olive or canola oil instead of butter or shortening when cooking. · Don't skip meals. Your blood sugar may drop too low if you skip meals and take insulin or certain medicines for diabetes. · Check with your doctor before you drink alcohol. Alcohol can cause your blood sugar to drop too low. Alcohol can also cause a bad reaction if you take certain diabetes medicines. Follow-up care is a key part of your treatment and safety. Be sure to make and go to all appointments, and call your doctor if you are having problems. It's also a good idea to know your test results and keep a list of the medicines you take. Where can you learn more? Go to http://yanet-sergio.info/. Enter S162 in the search box to learn more about \"Learning About Diabetes Food Guidelines. \" Current as of: December 7, 2017 Content Version: 11.7 © 6836-7352 [facility]. Care instructions adapted under license by [employer] (which disclaims liability or warranty for this information). If you have questions about a medical condition or this instruction, always ask your healthcare professional. [place] [address] any warranty or liability for your use of this information. Learning About Meal Planning for Diabetes Why plan your meals? Meal planning can be a key part of managing diabetes. Planning meals and snacks with the right balance of carbohydrate, protein, and fat can help you keep your blood sugar at the target level you set with your doctor. You don't have to eat special foods. You can eat what your family eats, including sweets once in a while. But you do have to pay attention to how often you eat and how much you eat of certain foods. You may want to work with a dietitian or a certified diabetes educator. He or she can give you tips and meal ideas and can answer your questions about meal planning. This health professional can also help you reach a healthy weight if that is one of your goals. What plan is right for you? Your dietitian or diabetes educator may suggest that you start with the plate format or carbohydrate counting. The plate format The plate format is a simple way to help you manage how you eat. You plan meals by learning how much space each food should take on a plate. Using the plate format helps you spread carbohydrate throughout the day. It can make it easier to keep your blood sugar level within your target range. It also helps you see if you're eating healthy portion sizes. To use the plate format, you put non-starchy vegetables on half your plate. Add meat or meat substitutes on one-quarter of the plate. Put a grain or starchy vegetable (such as brown rice or a potato) on the final quarter of the plate. You can add a small piece of fruit and some low-fat or fat-free milk or yogurt, depending on your carbohydrate goal for each meal. 
Here are some tips for using the plate format: · Make sure that you are not using an oversized plate. A 9-inch plate is best. Many restaurants use larger plates. · Get used to using the plate format at home. Then you can use it when you eat out. · Write down your questions about using the plate format. Talk to your doctor, a dietitian, or a diabetes educator about your concerns. Carbohydrate counting With carbohydrate counting, you plan meals based on the amount of carbohydrate in each food. Carbohydrate raises blood sugar higher and more quickly than any other nutrient. It is found in desserts, breads and cereals, and fruit. It's also found in starchy vegetables such as potatoes and corn, grains such as rice and pasta, and milk and yogurt. Spreading carbohydrate throughout the day helps keep your blood sugar levels within your target range. Your daily amount depends on several things, including your weight, how active you are, which diabetes medicines you take, and what your goals are for your blood sugar levels. A registered dietitian or diabetes educator can help you plan how much carbohydrate to include in each meal and snack. A guideline for your daily amount of carbohydrate is: · 45 to 60 grams at each meal. That's about the same as 3 to 4 carbohydrate servings. · 15 to 20 grams at each snack. That's about the same as 1 carbohydrate serving. The Nutrition Facts label on packaged foods tells you how much carbohydrate is in a serving of the food. First, look at the serving size on the food label. Is that the amount you eat in a serving? All of the nutrition information on a food label is based on that serving size. So if you eat more or less than that, you'll need to adjust the other numbers. Total carbohydrate is the next thing you need to look for on the label. If you count carbohydrate servings, one serving of carbohydrate is 15 grams. For foods that don't come with labels, such as fresh fruits and vegetables, you'll need a guide that lists carbohydrate in these foods. Ask your doctor, dietitian, or diabetes educator about books or other nutrition guides you can use. If you take insulin, you need to know how many grams of carbohydrate are in a meal. This lets you know how much rapid-acting insulin to take before you eat. If you use an insulin pump, you get a constant rate of insulin during the day. So the pump must be programmed at meals to give you extra insulin to cover the rise in blood sugar after meals. When you know how much carbohydrate you will eat, you can take the right amount of insulin. Or, if you always use the same amount of insulin, you need to make sure that you eat the same amount of carbohydrate at meals. If you need more help to understand carbohydrate counting and food labels, ask your doctor, dietitian, or diabetes educator. How do you get started with meal planning? Here are some tips to get started: 
· Plan your meals a week at a time. Don't forget to include snacks too. · Use cookbooks or online recipes to plan several main meals. Plan some quick meals for busy nights.  You also can double some recipes that freeze well. Then you can save half for other busy nights when you don't have time to cook. · Make sure you have the ingredients you need for your recipes. If you're running low on basic items, put these items on your shopping list too. · List foods that you use to make breakfasts, lunches, and snacks. List plenty of fruits and vegetables. · Post this list on the refrigerator. Add to it as you think of more things you need. · Take the list to the store to do your weekly shopping. Follow-up care is a key part of your treatment and safety. Be sure to make and go to all appointments, and call your doctor if you are having problems. It's also a good idea to know your test results and keep a list of the medicines you take. Where can you learn more? Go to http://yanet-sergio.info/. Fidelia Hill in the search box to learn more about \"Learning About Meal Planning for Diabetes. \" Current as of: December 7, 2017 Content Version: 11.7 © 5287-6872 [employer]. Care instructions adapted under license by [employer] (which disclaims liability or warranty for this information). If you have questions about a medical condition or this instruction, always ask your healthcare professional. [address] any warranty or liability for your use of this information. Introducing [facility]! Dear Juan Ardon: Thank you for requesting a [employer] account. Our records indicate that you already have an active [employer] account. You can access your account anytime at [email or web address] Did you know that you can access your hospital and ER discharge instructions at any time in [employer]? You can also review all of your test results from your hospital stay or ER visit. Additional Information If you have questions, please visit the Frequently Asked Questions section of the [employer] website at [email or web address] Remember, MyChart is NOT to be used for urgent needs. For medical emergencies, dial 911. Now available from your iPhone and Android! Please provide this summary of care documentation to your next provider. Your primary care clinician is listed as Maria De Jesus Walton. If you have any questions after today's visit, please call [phone].

## 2018-08-22 NOTE — PATIENT INSTRUCTIONS
Learning About Diabetes Food Guidelines  Your Care Instructions    Meal planning is important to manage diabetes. It helps keep your blood sugar at a target level (which you set with your doctor). You don't have to eat special foods. You can eat what your family eats, including sweets once in a while. But you do have to pay attention to how often you eat and how much you eat of certain foods. You may want to work with a dietitian or a certified diabetes educator (CDE) to help you plan meals and snacks. A dietitian or CDE can also help you lose weight if that is one of your goals. What should you know about eating carbs? Managing the amount of carbohydrate (carbs) you eat is an important part of healthy meals when you have diabetes. Carbohydrate is found in many foods. · Learn which foods have carbs. And learn the amounts of carbs in different foods. ¨ Bread, cereal, pasta, and rice have about 15 grams of carbs in a serving. A serving is 1 slice of bread (1 ounce), ½ cup of cooked cereal, or 1/3 cup of cooked pasta or rice. ¨ Fruits have 15 grams of carbs in a serving. A serving is 1 small fresh fruit, such as an apple or orange; ½ of a banana; ½ cup of cooked or canned fruit; ½ cup of fruit juice; 1 cup of melon or raspberries; or 2 tablespoons of dried fruit. ¨ Milk and no-sugar-added yogurt have 15 grams of carbs in a serving. A serving is 1 cup of milk or 2/3 cup of no-sugar-added yogurt. ¨ Starchy vegetables have 15 grams of carbs in a serving. A serving is ½ cup of mashed potatoes or sweet potato; 1 cup winter squash; ½ of a small baked potato; ½ cup of cooked beans; or ½ cup cooked corn or green peas. · Learn how much carbs to eat each day and at each meal. A dietitian or CDE can teach you how to keep track of the amount of carbs you eat. This is called carbohydrate counting. · If you are not sure how to count carbohydrate grams, use the Plate Method to plan meals.  It is a good, quick way to make sure that you have a balanced meal. It also helps you spread carbs throughout the day. ¨ Divide your plate by types of foods. Put non-starchy vegetables on half the plate, meat or other protein food on one-quarter of the plate, and a grain or starchy vegetable in the final quarter of the plate. To this you can add a small piece of fruit and 1 cup of milk or yogurt, depending on how many carbs you are supposed to eat at a meal.  · Try to eat about the same amount of carbs at each meal. Do not \"save up\" your daily allowance of carbs to eat at one meal.  · Proteins have very little or no carbs per serving. Examples of proteins are beef, chicken, turkey, fish, eggs, tofu, cheese, cottage cheese, and peanut butter. A serving size of meat is 3 ounces, which is about the size of a deck of cards. Examples of meat substitute serving sizes (equal to 1 ounce of meat) are 1/4 cup of cottage cheese, 1 egg, 1 tablespoon of peanut butter, and ½ cup of tofu. How can you eat out and still eat healthy? · Learn to estimate the serving sizes of foods that have carbohydrate. If you measure food at home, it will be easier to estimate the amount in a serving of restaurant food. · If the meal you order has too much carbohydrate (such as potatoes, corn, or baked beans), ask to have a low-carbohydrate food instead. Ask for a salad or green vegetables. · If you use insulin, check your blood sugar before and after eating out to help you plan how much to eat in the future. · If you eat more carbohydrate at a meal than you had planned, take a walk or do other exercise. This will help lower your blood sugar. What else should you know? · Limit saturated fat, such as the fat from meat and dairy products. This is a healthy choice because people who have diabetes are at higher risk of heart disease. So choose lean cuts of meat and nonfat or low-fat dairy products.  Use olive or canola oil instead of butter or shortening when cooking. · Don't skip meals. Your blood sugar may drop too low if you skip meals and take insulin or certain medicines for diabetes. · Check with your doctor before you drink alcohol. Alcohol can cause your blood sugar to drop too low. Alcohol can also cause a bad reaction if you take certain diabetes medicines. Follow-up care is a key part of your treatment and safety. Be sure to make and go to all appointments, and call your doctor if you are having problems. It's also a good idea to know your test results and keep a list of the medicines you take. Where can you learn more? Go to http://yanet-sergio.info/. Enter B541 in the search box to learn more about \"Learning About Diabetes Food Guidelines. \"  Current as of: December 7, 2017  Content Version: 11.7  © 2281-3215 [employer]. Care instructions adapted under license by [employer] (which disclaims liability or warranty for this information). If you have questions about a medical condition or this instruction, always ask your healthcare professional. [place] [id number] any warranty or liability for your use of this information. Learning About Meal Planning for Diabetes  Why plan your meals? Meal planning can be a key part of managing diabetes. Planning meals and snacks with the right balance of carbohydrate, protein, and fat can help you keep your blood sugar at the target level you set with your doctor. You don't have to eat special foods. You can eat what your family eats, including sweets once in a while. But you do have to pay attention to how often you eat and how much you eat of certain foods. You may want to work with a dietitian or a certified diabetes educator. He or she can give you tips and meal ideas and can answer your questions about meal planning. This health professional can also help you reach a healthy weight if that is one of your goals. What plan is right for you?   Your dietitian or diabetes educator may suggest that you start with the plate format or carbohydrate counting. The plate format  The plate format is a simple way to help you manage how you eat. You plan meals by learning how much space each food should take on a plate. Using the plate format helps you spread carbohydrate throughout the day. It can make it easier to keep your blood sugar level within your target range. It also helps you see if you're eating healthy portion sizes. To use the plate format, you put non-starchy vegetables on half your plate. Add meat or meat substitutes on one-quarter of the plate. Put a grain or starchy vegetable (such as brown rice or a potato) on the final quarter of the plate. You can add a small piece of fruit and some low-fat or fat-free milk or yogurt, depending on your carbohydrate goal for each meal.  Here are some tips for using the plate format:  · Make sure that you are not using an oversized plate. A 9-inch plate is best. Many restaurants use larger plates. · Get used to using the plate format at home. Then you can use it when you eat out. · Write down your questions about using the plate format. Talk to your doctor, a dietitian, or a diabetes educator about your concerns. Carbohydrate counting  With carbohydrate counting, you plan meals based on the amount of carbohydrate in each food. Carbohydrate raises blood sugar higher and more quickly than any other nutrient. It is found in desserts, breads and cereals, and fruit. It's also found in starchy vegetables such as potatoes and corn, grains such as rice and pasta, and milk and yogurt. Spreading carbohydrate throughout the day helps keep your blood sugar levels within your target range. Your daily amount depends on several things, including your weight, how active you are, which diabetes medicines you take, and what your goals are for your blood sugar levels.  A registered dietitian or diabetes educator can help you plan how much carbohydrate to include in each meal and snack. A guideline for your daily amount of carbohydrate is:  · 45 to 60 grams at each meal. That's about the same as 3 to 4 carbohydrate servings. · 15 to 20 grams at each snack. That's about the same as 1 carbohydrate serving. The Nutrition Facts label on packaged foods tells you how much carbohydrate is in a serving of the food. First, look at the serving size on the food label. Is that the amount you eat in a serving? All of the nutrition information on a food label is based on that serving size. So if you eat more or less than that, you'll need to adjust the other numbers. Total carbohydrate is the next thing you need to look for on the label. If you count carbohydrate servings, one serving of carbohydrate is 15 grams. For foods that don't come with labels, such as fresh fruits and vegetables, you'll need a guide that lists carbohydrate in these foods. Ask your doctor, dietitian, or diabetes educator about books or other nutrition guides you can use. If you take insulin, you need to know how many grams of carbohydrate are in a meal. This lets you know how much rapid-acting insulin to take before you eat. If you use an insulin pump, you get a constant rate of insulin during the day. So the pump must be programmed at meals to give you extra insulin to cover the rise in blood sugar after meals. When you know how much carbohydrate you will eat, you can take the right amount of insulin. Or, if you always use the same amount of insulin, you need to make sure that you eat the same amount of carbohydrate at meals. If you need more help to understand carbohydrate counting and food labels, ask your doctor, dietitian, or diabetes educator. How do you get started with meal planning? Here are some tips to get started:  · Plan your meals a week at a time. Don't forget to include snacks too. · Use cookbooks or online recipes to plan several main meals.  Plan some quick meals for busy nights. You also can double some recipes that freeze well. Then you can save half for other busy nights when you don't have time to cook. · Make sure you have the ingredients you need for your recipes. If you're running low on basic items, put these items on your shopping list too. · List foods that you use to make breakfasts, lunches, and snacks. List plenty of fruits and vegetables. · Post this list on the refrigerator. Add to it as you think of more things you need. · Take the list to the store to do your weekly shopping. Follow-up care is a key part of your treatment and safety. Be sure to make and go to all appointments, and call your doctor if you are having problems. It's also a good idea to know your test results and keep a list of the medicines you take. Where can you learn more? Go to http://yanet-sergio.info/. Hemant Turpin in the search box to learn more about \"Learning About Meal Planning for Diabetes. \"  Current as of: December 7, 2017  Content Version: 11.7  © 2448-1344 [employer]. Care instructions adapted under license by [email or web address] (which disclaims liability or warranty for this information). If you have questions about a medical condition or this instruction, always ask your healthcare professional. [address] any warranty or liability for your use of this information.

## 2018-08-27 PROBLEM — R97.20 RISING PSA LEVEL: Status: ACTIVE | Noted: 2018-08-27

## 2018-08-27 NOTE — PROGRESS NOTES
HPI:   Joselin Verdugo is a 74y.o. year old male who presents today for evaluation of prehypertension, hyperlipidemia, diabetes mellitus, prior provoked DVT, and GERD. He reports that he is doing relatively well. At his last visit in 5/2018, he reported two episodes of rectal bleeding occurring with bowel movements. First episode was in 4/2018, when he noticed blood on the outside of his stool and in the toilet water during a bowel movement. He denies any abdominal pain, melena, or change in caliber or character of his stool. Second episode occurred in early 5/2018. He denies any fatigue, pallor, pica, or restless leg symptoms. His weight had decreased six pounds since 2/2018, but he reported trying to lose weight to help with his diabetes mellitus. His last colonoscopy was in 3/2017 by Dr. Mónica Holland. He was referred to GI and evaluated by NP [facility], who recommended observation with plans to proceed with flexible sigmoidoscopy if recurs. He reports today that he has had not further episodes. He is otherwise without complaints and feeling well. He has a history of prehypertension, not treated with medication. He states that he continues to monitor his blood pressure at home and reports that it is ranging 120-130/ 70-80. He states that he has resumed walking for exercise, and does yard work. He denies any chest pain, shortness of breath at rest or with exertion, palpitations, lightheadedness, or edema. He also has a history of hyperlipidemia, treated with moderate intensity dose atorvastatin. He has a history of diabetes mellitus, recently diagnosed in 2/2016 with HbA1c of 6.5. Review of chart showed that fasting blood glucose has been elevated since 2010 ranging from 107-128. He was started on metformin in 11/2017, but had difficulty with the ER formulation due to the size of the pill. He was changed to metformin IR in 2/2018 without further difficulties.  He denies any polyuria, polydipsia, nocturia, or blurry vision, and has no history of retinopathy, neuropathy, or nephropathy. He has regular eye exams with Dr. Mcfarland [facility] [place] [facility] He has a history of a provoked DVT, occurring in 8/2012 following an airplane ride home from [place]. Venous duplex scan (9/1/2012) at [facility] showed an acute nonocclusive DVT in the left popliteal vein, and an acute thrombus in the left gastrocnemius and soleus veins. He was treated with Lovenox and transitioned to coumadin and completed three months of therapy. 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 He reports occasional mild swelling in his left foot, but denies any calf pain or tenderness, shortness of breath, or pleuritic chest pain. He has had multiple long plane rides since that time without recurrence. He had an excisional biopsy of a mass in his right posterior upper arm on 6/30/2017 by Dr. Toyin Leger. Pathology showed an epidermal inclusion cyst.     He has a history of GERD, but uses over the counter antacids occasionally. He also has a reported history of a gastric ulcer, but records are unavailable. He had a screening colonoscopy in 11/2011 by Dr. Cathi Prado showing mild diverticulosis, a 2 mm sessile polyp in the mid sigmoid colon (fulgurized), and a 5 mm polyp in the proximal sigmoid colon (pathology unavailable). He had another screening colonoscopy in 3/2017 by Dr. Cathi Prado showing a 3 mm sessile descending colon polyp (pathology: tubular adenoma). Follow-up recommended for five years. Past Medical History:   Diagnosis Date    Colon polyps     Diabetes mellitus ([address]     FH: prostate cancer     GERD (gastroesophageal reflux disease)     History of DVT (deep vein thrombosis) 09/2012    Provoked following airplane ride home from [place]. Venous duplex scan showed acute nonocclusive DVT in left popliteal vein; acute thrombosis left gastocnemius and soleus vein.     Hypercholesterolemia     Hypertension  Thromboembolus ([address]     leg  years ago     Past Surgical History:   Procedure Laterality Date    HX GI      colonoscopy x 3    NM EXCISION TUMOR SOFT TISSUE SHOULDER SUBQ 3+CM Right 2017    Dr. Karen Barrera     Current Outpatient Prescriptions   Medication Sig    cyanocobalamin (VITAMIN B-12) 1,000 mcg tablet Take 1,000 mcg by mouth daily.  atorvastatin (LIPITOR) 20 mg tablet Take 1 Tab by mouth daily.  metFORMIN (GLUCOPHAGE) 500 mg tablet Take 1 Tab by mouth two (2) times daily (with meals).  cholecalciferol (VITAMIN D3) 1,000 unit cap Take 1,000 Units by mouth daily. No current facility-administered medications for this visit. Allergies and Intolerances:   No Known Allergies     Family History: His mother is alive and has DM. His father  of a stroke at age 80. He also had prostate cancer. His brother has prostate cancer metastatic to bone. Family History   Problem Relation Age of Onset    Diabetes Mother     Cancer Father     Stroke Father     Hypertension Father     Diabetes Brother      Social History:   He  reports that he quit smoking about 19 years ago. He has never used smokeless tobacco. He smoked 0.5 ppd for 20 years, stopping in . He is  and has one living son, and one son who is . 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 History   Alcohol Use No     Immunization History:  Immunization History   Administered Date(s) Administered    Pneumococcal Polysaccharide (PPSV-23) 2017    TD Vaccine 2007    Tdap 2017    Zoster Vaccine, Live 2013       Review of Systems:   As above included in HPI. Otherwise 11 point review of systems negative including constitutional, skin, HENT, eyes, respiratory, cardiovascular, gastrointestinal, genitourinary, musculoskeletal, endocrine, hematologic, allergy, and neurologic.     Physical:   Vitals:   BP: 124/82; repeat 108/72 left arm  HR: 76  WT: 203 lb (92.1 kg)  BMI:  26.78 kg/m2    Exam:   Patient appears in no apparent distress. Affect is appropriate. HEENT --Anicteric sclerae, tympanic membranes normal,  ear canals normal.  PERRL, EOMI, conjunctiva and lids normal.   Sinuses were nontender, turbinates normal, hearing normal.  Oropharynx without  erythema, normal tongue, oral mucosa and tonsils. No cervical lymphadenopathy. No thyromegaly, JVD, or bruits. Carotid pulses 2+ with normal upstroke. Lungs --Clear to auscultation. No wheezing or rales. Heart --Regular rate and rhythm, no murmurs, rubs, gallops, or clicks. Chest wall --Nontender to palpation. PMI normal.  Abdomen -- Soft and nontender, no hepatosplenomegaly or masses. Prostate  -- no asymmetry, nodularity, tenderness or enlargement  Rectal  -- normal tone, guaiac negative brown stool  Extremities -- Without cyanosis, clubbing, edema. 2+ pulses equally and bilaterally.   Normal looking digits, ROM intact  Neuro -- CN 2-12 intact, strength 5/5 with intact soft touch in all extremities  Derm - no obvious abnormalities noted, no rash    Diabetic foot exam:     Left Foot:   Visual Exam: normal    Pulse DP: 2+ (normal)   Filament test: normal sensation    Vibratory sensation: normal      Right Foot:   Visual Exam: normal    Pulse DP: 2+ (normal)   Filament test: normal sensation    Vibratory sensation: normal        Review of Data:  Labs:  Hospital Outpatient Visit on 08/15/2018   Component Date Value Ref Range Status    WBC 08/15/2018 4.6  4.6 - 13.2 K/uL Final    RBC 08/15/2018 4.85  4.70 - 5.50 M/uL Final    HGB 08/15/2018 13.1  13.0 - 16.0 g/dL Final    HCT 08/15/2018 41.5  36.0 - 48.0 % Final    MCV 08/15/2018 85.6  74.0 - 97.0 FL Final    MCH 08/15/2018 27.0  24.0 - 34.0 PG Final    MCHC 08/15/2018 31.6  31.0 - 37.0 g/dL Final    RDW 08/15/2018 13.6  11.6 - 14.5 % Final    PLATELET 92/63/4811 986  135 - 420 K/uL Final    MPV 08/15/2018 10.9  9.2 - 11.8 FL Final    NEUTROPHILS 08/15/2018 43 40 - 73 % Final    LYMPHOCYTES 08/15/2018 48  21 - 52 % Final    MONOCYTES 08/15/2018 8  3 - 10 % Final    EOSINOPHILS 08/15/2018 1  0 - 5 % Final    BASOPHILS 08/15/2018 0  0 - 2 % Final    ABS. NEUTROPHILS 08/15/2018 2.0  1.8 - 8.0 K/UL Final    ABS. LYMPHOCYTES 08/15/2018 2.2  0.9 - 3.6 K/UL Final    ABS. MONOCYTES 08/15/2018 0.3  0.05 - 1.2 K/UL Final    ABS. EOSINOPHILS 08/15/2018 0.0  0.0 - 0.4 K/UL Final    ABS. BASOPHILS 08/15/2018 0.0  0.0 - 0.1 K/UL Final    DF 08/15/2018 AUTOMATED    Final    Hemoglobin A1c 08/15/2018 6.8* 4.2 - 5.6 % Final    Est. average glucose 08/15/2018 148  mg/dL Final    LIPID PROFILE 08/15/2018        Final    Cholesterol, total 08/15/2018 152  <200 MG/DL Final    Triglyceride 08/15/2018 99  <150 MG/DL Final    HDL Cholesterol 08/15/2018 46  40 - 60 MG/DL Final    LDL, calculated 08/15/2018 86.2  0 - 100 MG/DL Final    VLDL, calculated 08/15/2018 19.8  MG/DL Final    CHOL/HDL Ratio 08/15/2018 3.3  0 - 5.0   Final    Sodium 08/15/2018 143  136 - 145 mmol/L Final    Potassium 08/15/2018 4.8  3.5 - 5.5 mmol/L Final    Chloride 08/15/2018 108  100 - 108 mmol/L Final    CO2 08/15/2018 30  21 - 32 mmol/L Final    Anion gap 08/15/2018 5  3.0 - 18 mmol/L Final    Glucose 08/15/2018 114* 74 - 99 mg/dL Final    BUN 08/15/2018 21* 7.0 - 18 MG/DL Final    Creatinine 08/15/2018 1.13  0.6 - 1.3 MG/DL Final    BUN/Creatinine ratio 08/15/2018 19  12 - 20   Final    GFR est AA 08/15/2018 >60  >60 ml/min/1.73m2 Final    GFR est non-AA 08/15/2018 >60  >60 ml/min/1.73m2 Final    Calcium 08/15/2018 9.3  8.5 - 10.1 MG/DL Final    Bilirubin, total 08/15/2018 0.5  0.2 - 1.0 MG/DL Final    ALT (SGPT) 08/15/2018 22  16 - 61 U/L Final    AST (SGOT) 08/15/2018 15  15 - 37 U/L Final    Alk.  phosphatase 08/15/2018 70  45 - 117 U/L Final    Protein, total 08/15/2018 6.8  6.4 - 8.2 g/dL Final    Albumin 08/15/2018 3.8  3.4 - 5.0 g/dL Final    Globulin 08/15/2018 3.0  2.0 - 4.0 g/dL Final    A-G Ratio 08/15/2018 1.3  0.8 - 1.7   Final    Color 08/15/2018 YELLOW    Final    Appearance 08/15/2018 CLEAR    Final    Specific gravity 08/15/2018 1.026  1.005 - 1.030   Final    pH (UA) 08/15/2018 5.0  5.0 - 8.0   Final    Protein 08/15/2018 NEGATIVE   NEG mg/dL Final    Glucose 08/15/2018 NEGATIVE   NEG mg/dL Final    Ketone 08/15/2018 TRACE* NEG mg/dL Final    Bilirubin 08/15/2018 NEGATIVE   NEG   Final    Blood 08/15/2018 NEGATIVE   NEG   Final    Urobilinogen 08/15/2018 0.2  0.2 - 1.0 EU/dL Final    Nitrites 08/15/2018 NEGATIVE   NEG   Final    Leukocyte Esterase 08/15/2018 NEGATIVE   NEG   Final    WBC 08/15/2018 0  0 - 4 /hpf Final    RBC 08/15/2018 0  0 - 5 /hpf Final    Epithelial cells 08/15/2018 FEW  0 - 5 /lpf Final    Bacteria 08/15/2018 NEGATIVE   NEG /hpf Final    TSH 08/15/2018 0.98  0.36 - 3.74 uIU/mL Final    Prostate Specific Ag 08/15/2018 2.5  0.0 - 4.0 ng/mL Final    Microalbumin,urine random 08/15/2018 0.90  0 - 3.0 MG/DL Final    Creatinine, urine 08/15/2018 215.71* 30 - 125 mg/dL Final    Microalbumin/Creat ratio (mg/g cre* 08/15/2018 4  0 - 30 mg/g Final    Vitamin D 25-Hydroxy 08/15/2018 29.4* 30 - 100 ng/mL Final     Health Maintenance:  Screening:    Colorectal: colonoscopy (3/2017) tubular adenoma. Dr. Ravinder Portillo. Due 2022.    Depression: none   DM (HbA1c/FPG): HbA1c 6.8 (8/2018)   Hepatitis C: negative (2/2016)   Falls: none   DEXA: N/A   PSA/ANU: PSA 2.5/ ANU (8/2018)   Glaucoma: regular eye exams with [facility] Amisha Simmons of [facility] (last 1/2018)   Smoking: distant past   Vitamin D: 29.4 (8/2018)   Medicare Wellness: 10/12/2017    Impression:  Patient Active Problem List   Diagnosis Code    Hyperlipidemia E78.5    Family history of prostate cancer Z80.42    Colon polyps K63.5    Gastric ulcer K25.9    Deep vein thrombosis (DVT); left popliteal, gastrocnemius, soleus veins 9/2012 I82.432    Diabetes mellitus ([facility] E11.9    Essential hypertension I10  GERD (gastroesophageal reflux disease) K21.9    Vitamin D insufficiency E55.9       Plan:  1. Diabetes mellitus. HbA1c stable at 6.8 on metformin  mg with breakfast and dinner. He has lost a total of 10 pounds since 2/2018, although had gained a total of 21 pounds since retiring last year. He had been meeting regularly with diabetes educator. No current evidence of microvascular complications. Urine microalbumin/ creatinine ratio without evidence of microalbuminuria, and foot exam normal today. On statin, but not on Ace-I or ARB given now normotensive. Continue follow-up for annual eye exams. Emphasized importance of continued lifestyle modifications, including diet, exercise, and weight loss. Continue to follow. 2. Rectal bleeding. Two episodes over two month span. Description most consistent with hemorrhoidal source. Recent colonoscopy in 3/2017 with only a single tubular adenoma removed. CBC and iron studies normal. Underwent reevaluation by GI, and did not feel further testing warranted unless recurs as felt most likely anorectal source. Patient reports no recurrence. Follow. 3. Hypertension. Well controlled in office today. Renal function remains normal with creatinine 1.13/ eGFR >60. Continue to follow. Abdominal ultrasound negative for aneurysm (10/2017). 4. Hyperlipidemia. Previously on low intensity dose simvastatin with . Changed to moderate intensity dose atorvastatin 20 mg daily with improvement to LDL to 86 today, indicative of reasonable control. Emphasized importance of lifestyle modifications, including diet, exercise, and weight loss. Recheck lipid panel at next visit. 5. Rising PSA. PSA increased to 2.5 today, which remains in the normal range although doubled from last year value of 1.2. Strong family history of prostate cancer. Will recheck PSA in 6 months. ANU negative today. Follow closely. 6. H/O provoked DVT. No evidence of recurrence since 2012.  Continue to follow. 7. GERD. Symptomatic control with over the counter antacids. Follow. 8. Health maintenance. Discussed Shingrix vaccine and given script today. Already received pneumovax. Due for Prevnar. Will give next visit. Other immunizations up to date. Colonoscopy due 2022. Prostate cancer screening up to date as above. Continue annual eye exams. Vitamin D level normal. Continue maintenance dose supplement. Welcome to Medicare visit completed. Patient understands recommendations and agrees with plan. Follow-up in 6 months.

## 2018-09-10 ENCOUNTER — APPOINTMENT (OUTPATIENT)
Dept: NUTRITION | Age: 68
End: 2018-09-10
Payer: MEDICARE

## 2018-09-12 ENCOUNTER — HOSPITAL ENCOUNTER (OUTPATIENT)
Dept: NUTRITION | Age: 68
Discharge: HOME | End: 2018-09-12
Payer: MEDICARE

## 2018-09-12 PROCEDURE — 97803 MED NUTRITION INDIV SUBSEQ: CPT

## 2018-09-12 NOTE — PROGRESS NOTES
NUTRITION - FOLLOW-UP TREATMENT NOTE  Patient Name: Elana Tatumr         Date: 2018  : 1950    YES Patient  Verified  Diagnosis: DM2   In time:   930             Out time:   1000   Total Treatment Time (min):   30     SUBJECTIVE/ASSESSMENT  Changes in medication or medical history? Any new allergies, surgeries or procedures? NO    If yes, update Summary List   Met with Pt for last follow up per insurance coverage. He reports doing wel pairing protein with carbohydrates at each meal, although it is challenging at times. Pt has remained active throughout the summer, stating it's easier when the weather is warm. He is motivated to continue with a healthful lifestyle, understanding this will help to manage his DM and prevent medical complications. Expect compliance. Weight not take at follow up. Current Wt:  Previous Wt: 209.4 Wt Change:      Achievement of Goals: 1. Include protein at each meal.=met, continue  2. Walk for 20-30 minutes every other day, either in morning or late evening.=continue  New Patient Goals:  No new goals made. Patient Education:  [x]  Review current plan with patient   []  Other:    Handouts/  Information Provided: []  Carbohydrates  []  Protein  []  Fiber  []  Serving Sizes  []  Fluids  []  General guidelines []  Diabetes  []  Cholesterol  []  Sodium  []  SBGM  []  Food Journals  []  Others:      PLAN  []  Continue on current plan []  Follow-up PRN   [x]  Discharge due to : Met insurance coverage.     []  Next appt:      Dietitian: Sameer Mckeon RDN    Date: 2018 Time: 9:40 AM

## 2018-12-17 RX ORDER — METFORMIN HYDROCHLORIDE 500 MG/1
TABLET ORAL
Qty: 180 TAB | Refills: 2 | Status: SHIPPED | OUTPATIENT
Start: 2018-12-17 | End: 2019-09-18 | Stop reason: SDUPTHER

## 2019-03-18 RX ORDER — ATORVASTATIN CALCIUM 20 MG/1
TABLET, FILM COATED ORAL
Qty: 90 TAB | Refills: 2 | Status: SHIPPED | OUTPATIENT
Start: 2019-03-18 | End: 2019-12-09 | Stop reason: SDUPTHER

## 2019-05-30 ENCOUNTER — HOSPITAL ENCOUNTER (OUTPATIENT)
Dept: LAB | Age: 69
Discharge: HOME | End: 2019-05-30
Payer: MEDICARE

## 2019-05-30 ENCOUNTER — APPOINTMENT (OUTPATIENT)
Dept: INTERNAL MEDICINE CLINIC | Age: 69
End: 2019-05-30

## 2019-05-30 DIAGNOSIS — D12.4 ADENOMATOUS POLYP OF DESCENDING COLON: ICD-10-CM

## 2019-05-30 DIAGNOSIS — E78.5 HYPERLIPIDEMIA, UNSPECIFIED HYPERLIPIDEMIA TYPE: ICD-10-CM

## 2019-05-30 DIAGNOSIS — R97.20 RISING PSA LEVEL: ICD-10-CM

## 2019-05-30 DIAGNOSIS — Z86.718 H/O DEEP VENOUS THROMBOSIS: ICD-10-CM

## 2019-05-30 DIAGNOSIS — Z80.42 FAMILY HISTORY OF PROSTATE CANCER: ICD-10-CM

## 2019-05-30 DIAGNOSIS — Z12.5 SCREENING PSA (PROSTATE SPECIFIC ANTIGEN): ICD-10-CM

## 2019-05-30 DIAGNOSIS — K21.9 GASTROESOPHAGEAL REFLUX DISEASE, ESOPHAGITIS PRESENCE NOT SPECIFIED: ICD-10-CM

## 2019-05-30 DIAGNOSIS — E11.9 TYPE 2 DIABETES MELLITUS WITHOUT COMPLICATION, WITHOUT LONG-TERM CURRENT USE OF INSULIN (HCC): ICD-10-CM

## 2019-05-30 DIAGNOSIS — I10 ESSENTIAL HYPERTENSION: ICD-10-CM

## 2019-05-30 LAB
ANION GAP SERPL CALC-SCNC: 7 MMOL/L (ref 3–18)
BASOPHILS # BLD: 0 K/UL (ref 0–0.1)
BASOPHILS NFR BLD: 0 % (ref 0–2)
BUN SERPL-MCNC: 16 MG/DL (ref 7–18)
BUN/CREAT SERPL: 16 (ref 12–20)
CALCIUM SERPL-MCNC: 8.9 MG/DL (ref 8.5–10.1)
CHLORIDE SERPL-SCNC: 107 MMOL/L (ref 100–108)
CHOLEST SERPL-MCNC: 148 MG/DL
CO2 SERPL-SCNC: 28 MMOL/L (ref 21–32)
CREAT SERPL-MCNC: 1.03 MG/DL (ref 0.6–1.3)
DIFFERENTIAL METHOD BLD: ABNORMAL
EOSINOPHIL # BLD: 0.1 K/UL (ref 0–0.4)
EOSINOPHIL NFR BLD: 1 % (ref 0–5)
ERYTHROCYTE [DISTWIDTH] IN BLOOD BY AUTOMATED COUNT: 13.3 % (ref 11.6–14.5)
EST. AVERAGE GLUCOSE BLD GHB EST-MCNC: 146 MG/DL
GLUCOSE SERPL-MCNC: 129 MG/DL (ref 74–99)
HBA1C MFR BLD: 6.7 % (ref 4.2–5.6)
HCT VFR BLD AUTO: 44.6 % (ref 36–48)
HDLC SERPL-MCNC: 46 MG/DL (ref 40–60)
HDLC SERPL: 3.2 {RATIO} (ref 0–5)
HGB BLD-MCNC: 13.7 G/DL (ref 13–16)
LDLC SERPL CALC-MCNC: 81.2 MG/DL (ref 0–100)
LIPID PROFILE,FLP: NORMAL
LYMPHOCYTES # BLD: 2.1 K/UL (ref 0.9–3.6)
LYMPHOCYTES NFR BLD: 45 % (ref 21–52)
MCH RBC QN AUTO: 26.4 PG (ref 24–34)
MCHC RBC AUTO-ENTMCNC: 30.7 G/DL (ref 31–37)
MCV RBC AUTO: 85.9 FL (ref 74–97)
MONOCYTES # BLD: 0.3 K/UL (ref 0.05–1.2)
MONOCYTES NFR BLD: 7 % (ref 3–10)
NEUTS SEG # BLD: 2.1 K/UL (ref 1.8–8)
NEUTS SEG NFR BLD: 47 % (ref 40–73)
PLATELET # BLD AUTO: 207 K/UL (ref 135–420)
PMV BLD AUTO: 10.1 FL (ref 9.2–11.8)
POTASSIUM SERPL-SCNC: 4.7 MMOL/L (ref 3.5–5.5)
RBC # BLD AUTO: 5.19 M/UL (ref 4.7–5.5)
SODIUM SERPL-SCNC: 142 MMOL/L (ref 136–145)
TRIGL SERPL-MCNC: 104 MG/DL (ref ?–150)
VLDLC SERPL CALC-MCNC: 20.8 MG/DL
WBC # BLD AUTO: 4.5 K/UL (ref 4.6–13.2)

## 2019-05-30 PROCEDURE — 85025 COMPLETE CBC W/AUTO DIFF WBC: CPT

## 2019-05-30 PROCEDURE — 80048 BASIC METABOLIC PNL TOTAL CA: CPT

## 2019-05-30 PROCEDURE — 80061 LIPID PANEL: CPT

## 2019-05-30 PROCEDURE — 36415 COLL VENOUS BLD VENIPUNCTURE: CPT

## 2019-05-30 PROCEDURE — 83036 HEMOGLOBIN GLYCOSYLATED A1C: CPT

## 2019-05-30 PROCEDURE — 84153 ASSAY OF PSA TOTAL: CPT

## 2019-05-31 LAB — PSA SERPL-MCNC: 0.9 NG/ML (ref 0–4)

## 2019-06-04 ENCOUNTER — OFFICE VISIT (OUTPATIENT)
Dept: INTERNAL MEDICINE CLINIC | Age: 69
End: 2019-06-04

## 2019-06-04 VITALS
SYSTOLIC BLOOD PRESSURE: 118 MMHG | BODY MASS INDEX: 25.49 KG/M2 | HEIGHT: 74 IN | HEART RATE: 75 BPM | DIASTOLIC BLOOD PRESSURE: 78 MMHG | RESPIRATION RATE: 16 BRPM | OXYGEN SATURATION: 98 % | TEMPERATURE: 97.9 F | WEIGHT: 198.6 LBS

## 2019-06-04 DIAGNOSIS — K21.9 GASTROESOPHAGEAL REFLUX DISEASE, ESOPHAGITIS PRESENCE NOT SPECIFIED: ICD-10-CM

## 2019-06-04 DIAGNOSIS — Z12.5 ENCOUNTER FOR SCREENING FOR MALIGNANT NEOPLASM OF PROSTATE: ICD-10-CM

## 2019-06-04 DIAGNOSIS — Z80.42 FAMILY HISTORY OF PROSTATE CANCER: ICD-10-CM

## 2019-06-04 DIAGNOSIS — D12.4 ADENOMATOUS POLYP OF DESCENDING COLON: ICD-10-CM

## 2019-06-04 DIAGNOSIS — Z00.00 MEDICARE ANNUAL WELLNESS VISIT, INITIAL: ICD-10-CM

## 2019-06-04 DIAGNOSIS — Z71.89 ACP (ADVANCE CARE PLANNING): ICD-10-CM

## 2019-06-04 DIAGNOSIS — E11.9 TYPE 2 DIABETES MELLITUS WITHOUT COMPLICATION, WITHOUT LONG-TERM CURRENT USE OF INSULIN (HCC): Primary | ICD-10-CM

## 2019-06-04 DIAGNOSIS — I10 ESSENTIAL HYPERTENSION: ICD-10-CM

## 2019-06-04 DIAGNOSIS — Z23 ENCOUNTER FOR IMMUNIZATION: ICD-10-CM

## 2019-06-04 DIAGNOSIS — Z86.718 H/O DEEP VENOUS THROMBOSIS: ICD-10-CM

## 2019-06-04 DIAGNOSIS — E55.9 VITAMIN D INSUFFICIENCY: ICD-10-CM

## 2019-06-04 DIAGNOSIS — E78.5 HYPERLIPIDEMIA, UNSPECIFIED HYPERLIPIDEMIA TYPE: ICD-10-CM

## 2019-06-04 NOTE — PROGRESS NOTES
This is an Initial Medicare Annual Wellness Exam (AWV) (Performed 12 months after IPPE or effective date of Medicare Part B enrollment, Once in a lifetime)    I have reviewed the patient's medical history in detail and updated the computerized patient record. History     Past Medical History:   Diagnosis Date    Colon polyps     Diabetes mellitus ([address]     FH: prostate cancer     GERD (gastroesophageal reflux disease)     History of DVT (deep vein thrombosis) 2012    Provoked following airplane ride home from [place]. Venous duplex scan showed acute nonocclusive DVT in left popliteal vein; acute thrombosis left gastocnemius and soleus vein.  Hypercholesterolemia     Hypertension     Thromboembolus ([address]     leg  years ago      Past Surgical History:   Procedure Laterality Date    HX GI      colonoscopy x 3    CT EXCISION TUMOR SOFT TISSUE SHOULDER SUBQ 3+CM Right 2017    Dr. Miriam Duvall     Current Outpatient Medications   Medication Sig Dispense Refill    pediatric multivitamin no.76 (FLINTSTONES COMPLETE PO) Take  by mouth.  atorvastatin (LIPITOR) 20 mg tablet TAKE 1 TABLET DAILY 90 Tab 2    metFORMIN (GLUCOPHAGE) 500 mg tablet TAKE 1 TABLET TWICE A DAY  WITH MEALS 180 Tab 2    cyanocobalamin (VITAMIN B-12) 1,000 mcg tablet Take 500 mcg by mouth daily.  cholecalciferol (VITAMIN D3) 1,000 unit cap Take 1,000 Units by mouth daily.        No Known Allergies  Family History   Problem Relation Age of Onset    Diabetes Mother     Cancer Father     Stroke Father     Hypertension Father     Diabetes Brother      Social History     Tobacco Use    Smoking status: Former Smoker     Last attempt to quit: 1999     Years since quittin.4    Smokeless tobacco: Never Used   Substance Use Topics    Alcohol use: No     Patient Active Problem List   Diagnosis Code    Hyperlipidemia E78.5    Family history of prostate cancer Z80.42    Colon polyps K63.5    Gastric ulcer K25.9    H/O deep venous thrombosis Z86.718    Diabetes mellitus ([facility] E11.9    Essential hypertension I10    GERD (gastroesophageal reflux disease) K21.9    Vitamin D insufficiency E55.9       Depression Risk Factor Screening:     3 most recent PHQ Screens 6/4/2019   Little interest or pleasure in doing things Not at all   Feeling down, depressed, irritable, or hopeless Not at all   Total Score PHQ 2 0     Alcohol Risk Factor Screening: You do not drink alcohol or very rarely. Functional Ability and Level of Safety:     Hearing Loss  Hearing is good. Activities of Daily Living  The home contains: no safety equipment. Patient does total self care    Fall Risk  Fall Risk Assessment, last 12 mths 6/4/2019   Able to walk? Yes   Fall in past 12 months? No       Abuse Screen  Patient is not abused    Cognitive Screening   Evaluation of Cognitive Function:  Has your family/caregiver stated any concerns about your memory: no  Normal    Patient Care Team   Patient Care Team:  Evans Lopez MD as PCP - General (Internal Medicine)  Daily Cameron NP as Nurse Practitioner (Nurse Practitioner)  Abel Jackson MD (Gastroenterology)    Assessment/Plan   Education and counseling provided:  Are appropriate based on today's review and evaluation  End-of-Life planning (with patient's consent)  Pneumococcal Vaccine  Prostate cancer screening tests (PSA, covered annually)  Colorectal cancer screening tests  Cardiovascular screening blood test  Screening for glaucoma  Medical nutrition therapy for individuals with diabetes or renal disease  Shingrix vaccine    Diagnoses and all orders for this visit:    1. Type 2 diabetes mellitus without complication, without long-term current use of insulin (HCC)  -     CBC WITH AUTOMATED DIFF; Future  -     HEMOGLOBIN A1C WITH EAG; Future  -     LIPID PANEL; Future  -     METABOLIC PANEL, COMPREHENSIVE; Future  -     PSA SCREENING (SCREENING);  Future  -     TSH 3RD GENERATION; Future  -     URINALYSIS W/MICROSCOPIC; Future  -     VITAMIN D, 25 HYDROXY; Future    2. Hyperlipidemia, unspecified hyperlipidemia type  -     CBC WITH AUTOMATED DIFF; Future  -     HEMOGLOBIN A1C WITH EAG; Future  -     LIPID PANEL; Future  -     METABOLIC PANEL, COMPREHENSIVE; Future  -     PSA SCREENING (SCREENING); Future  -     TSH 3RD GENERATION; Future  -     URINALYSIS W/MICROSCOPIC; Future  -     VITAMIN D, 25 HYDROXY; Future    3. Essential hypertension  -     CBC WITH AUTOMATED DIFF; Future  -     HEMOGLOBIN A1C WITH EAG; Future  -     LIPID PANEL; Future  -     METABOLIC PANEL, COMPREHENSIVE; Future  -     PSA SCREENING (SCREENING); Future  -     TSH 3RD GENERATION; Future  -     URINALYSIS W/MICROSCOPIC; Future  -     VITAMIN D, 25 HYDROXY; Future    4. Gastroesophageal reflux disease, esophagitis presence not specified  -     CBC WITH AUTOMATED DIFF; Future  -     HEMOGLOBIN A1C WITH EAG; Future  -     LIPID PANEL; Future  -     METABOLIC PANEL, COMPREHENSIVE; Future  -     PSA SCREENING (SCREENING); Future  -     TSH 3RD GENERATION; Future  -     URINALYSIS W/MICROSCOPIC; Future  -     VITAMIN D, 25 HYDROXY; Future    5. Family history of prostate cancer  -     CBC WITH AUTOMATED DIFF; Future  -     HEMOGLOBIN A1C WITH EAG; Future  -     LIPID PANEL; Future  -     METABOLIC PANEL, COMPREHENSIVE; Future  -     PSA SCREENING (SCREENING); Future  -     TSH 3RD GENERATION; Future  -     URINALYSIS W/MICROSCOPIC; Future  -     VITAMIN D, 25 HYDROXY; Future    6. H/O deep venous thrombosis  -     CBC WITH AUTOMATED DIFF; Future  -     HEMOGLOBIN A1C WITH EAG; Future  -     LIPID PANEL; Future  -     METABOLIC PANEL, COMPREHENSIVE; Future  -     PSA SCREENING (SCREENING); Future  -     TSH 3RD GENERATION; Future  -     URINALYSIS W/MICROSCOPIC; Future  -     VITAMIN D, 25 HYDROXY; Future    7. Encounter for immunization  -     PNEUMOCOCCAL CONJ VACCINE 13 VALENT IM  -     CBC WITH AUTOMATED DIFF;  Future  - HEMOGLOBIN A1C WITH EAG; Future  -     LIPID PANEL; Future  -     METABOLIC PANEL, COMPREHENSIVE; Future  -     PSA SCREENING (SCREENING); Future  -     TSH 3RD GENERATION; Future  -     URINALYSIS W/MICROSCOPIC; Future  -     VITAMIN D, 25 HYDROXY; Future    8. Vitamin D insufficiency  -     CBC WITH AUTOMATED DIFF; Future  -     HEMOGLOBIN A1C WITH EAG; Future  -     LIPID PANEL; Future  -     METABOLIC PANEL, COMPREHENSIVE; Future  -     PSA SCREENING (SCREENING); Future  -     TSH 3RD GENERATION; Future  -     URINALYSIS W/MICROSCOPIC; Future  -     VITAMIN D, 25 HYDROXY; Future    9. Adenomatous polyp of descending colon  -     CBC WITH AUTOMATED DIFF; Future  -     HEMOGLOBIN A1C WITH EAG; Future  -     LIPID PANEL; Future  -     METABOLIC PANEL, COMPREHENSIVE; Future  -     PSA SCREENING (SCREENING); Future  -     TSH 3RD GENERATION; Future  -     URINALYSIS W/MICROSCOPIC; Future  -     VITAMIN D, 25 HYDROXY; Future    10. Encounter for screening for malignant neoplasm of prostate   -     PSA SCREENING (SCREENING); Future    11. Medicare annual wellness visit, initial    15.  ACP (advance care planning)         Health Maintenance Due   Topic Date Due    Shingrix Vaccine Age 49> (1 of 2) 08/31/2002

## 2019-06-04 NOTE — PATIENT INSTRUCTIONS
Medicare Wellness Visit, Male    The best way to improve and maintain good health is to have a healthy lifestyle by eating a well-balanced diet, exercising regularly, limiting alcohol and stopping smoking. Regular visits with your physician or non-physician health care provider also support your good health. Preventive screening tests can find health problems before they become diseases or illnesses. Preventive services such as immunizations prevent serious infections. All people over age 72 should have a Pneumovax and a Prevnar-13 shot to prevent potentially life threatening infections with the pneumococcus bacteria, a common cause of pneumonia. These are once in a lifetime unless you and your provider decide differently. All people over 65 should have a yearly influenza vaccine or \"flu\" shot. This does not prevent infection with cold viruses but has been proven to prevent hospitalization and death from influenza. Although Medicare part B \"regular Medicare\" currently only covers tetanus vaccination in the context of an injury, a tetanus vaccine (Tdap or Td) is recommended every 10 years. A shingles vaccine is recommended once in a lifetime after age 61. The Shingles vaccine is also not covered by Medicare part B. Note, however, that both the Shingles vaccine and Tdap/Td are generally covered by secondary carriers. Please check your coverage and out of pocket expenses. Consider contacting your local health department because it may stock these vaccines for a reasonable charge. We currently have documentation of the following immunization history for you:  Immunization History   Administered Date(s) Administered    Pneumococcal Polysaccharide (PPSV-23) 07/12/2017    TD Vaccine 09/07/2007    Tdap 01/04/2017    Zoster Vaccine, Live 07/30/2013       Screening for infection with Hepatitis C is recommended for anyone born between [birth year] through [address]  The table at the bottom of this document indicates the status of this and other screening services. Screening for diabetes mellitus with a blood sugar test (glucose) should be done at least every 3 years until age 79. You and your health care provider may decide whether to continue screening after age 79. The most recent blood glucose we have on file for you is:   Lab Results   Component Value Date/Time    Glucose 129 (H) 05/30/2019 09:20 AM    Glucose (POC) 104 06/30/2017 09:10 AM       Glaucoma is a disease of the eye due to increased ocular pressure that can lead to blindness. People with risk factors for glaucoma ( race, diabetes, family history) should be screened at least every 2 years by an eye professional. This may be covered annually if indicated as determined by you and your doctor. Cardiovascular screening tests that check for elevated lipids or cholesterol (fatty part of blood) which can lead to heart disease and strokes should be done every 4-6 years through age 79. You and your health care provider may decide whether to continue screening after age 79. The most recent lipid panel we have on file for you is:   Lab Results   Component Value Date/Time    Cholesterol, total 148 05/30/2019 09:20 AM    HDL Cholesterol 46 05/30/2019 09:20 AM    LDL, calculated 81.2 05/30/2019 09:20 AM    VLDL, calculated 20.8 05/30/2019 09:20 AM    Triglyceride 104 05/30/2019 09:20 AM    CHOL/HDL Ratio 3.2 05/30/2019 09:20 AM       Colorectal cancer screening that evaluates for blood or polyps in your colon for people with average risk should be done yearly as a stool test, every five years as a flexible sigmoidoscope or every 10 years as a colonoscopy up to age 76. You and your health care provider may decide whether to continue screening after age 76. Men up to age 76 may elect to screen for prostate cancer with a blood test called a PSA at certain intervals, depending on their personal and family history.  This decision is between the patient and his provider. The most recent PSA values we have on file for you are:  Lab Results   Component Value Date/Time    Prostate Specific Ag 0.9 05/30/2019 09:20 AM    Prostate Specific Ag 2.5 08/15/2018 09:30 AM    Prostate Specific Ag 1.2 07/05/2017 08:30 AM    Prostate Specific Ag 0.939 08/04/2014 08:40 AM       If you have been a smoker or had family history of abdominal aortic aneurysms, you and your provider may decide to schedule an ultrasound test of your aorta. Our records show this was done on:  n/a    People who have smoked the equivalent of 1 pack per day for 30 years or more may benefit from screening for lung cancer with a yearly low dose CT scan until they have been non smokers for 15 years or competing health conditions render this unlikely to be beneficial. Our records show: n/a    Your Medicare Wellness Exam is recommended annually.     Here is a list of your current Health Maintenance items with a due date:  Health Maintenance   Topic Date Due    Shingrix Vaccine Age 49> (1 of 2) 08/31/2002    Pneumococcal 65+ years (1 of 2 - PCV13) 07/12/2018    Influenza Age 5 to Adult  08/01/2019    MICROALBUMIN Q1  08/15/2019    FOOT EXAM Q1  08/22/2019    HEMOGLOBIN A1C Q6M  11/30/2019    LIPID PANEL Q1  05/30/2020    MEDICARE YEARLY EXAM  06/04/2020    GLAUCOMA SCREENING Q2Y  01/18/2021    EYE EXAM RETINAL OR DILATED  01/18/2021    COLONOSCOPY  03/20/2022    DTaP/Tdap/Td series (2 - Td) 01/04/2027    Hepatitis C Screening  Completed

## 2019-06-04 NOTE — ACP (ADVANCE CARE PLANNING)
Do you have an Advanced Directive? YES, patient informed to bring in when he has the chance and he verbalized understanding.

## 2019-06-04 NOTE — ACP (ADVANCE CARE PLANNING)
Advance Care Planning (ACP) Provider Note - Comprehensive     Date of ACP Conversation: 06/04/19  Persons included in Conversation:  patient  Length of ACP Conversation in minutes:  16 minutes    Authorized Decision Maker (if patient is incapable of making informed decisions): wife and son  This person is:  Healthcare Agent/Medical Power of  under Advance Directive          General ACP for ALL Patients with Decision Making Capacity:   Importance of advance care planning, including choosing a healthcare agent to communicate patient's healthcare decisions if patient lost the ability to make decisions, such as after a sudden illness or accident  Understanding of the healthcare agent role was assessed and information provided  Exploration of values, goals, and preferences if recovery is not expected, even with continued medical treatment in the event of: Imminent death  Severe, permanent brain injury  \"In these circumstances, what matters most to you? \"  Care focused more on comfort or quality of life. Review of Existing Advance Directive:  Patient has not completed an advance directive previously. He states that he would designate his wife and son as his healthcare agents. He expresses that he does not wish life prolonging procedures for end of life care. For Serious or Chronic Illness:  Understanding of medical condition      Interventions Provided:  Recommended completion of Advance Directive form after review of ACP materials and conversation with prospective healthcare agent   Recommended communicating the plan and making copies for the healthcare agent, personal physician, and others as appropriate (e.g., health system)  Recommended review of completed ACP document annually or upon change in health status   Recommended to complete advance directive and return completed form to office to be copied and scanned into chart. Paperwork provided and reviewed.

## 2019-06-04 NOTE — PROGRESS NOTES
Chief Complaint   Patient presents with    Diabetes     6 month follow up with lab results.  Annual Wellness Visit     Yearly Medicate wellness exam.     Health Maintenance Due   Topic Date Due    Shingrix Vaccine Age 49> (1 of 2) 08/31/2002    Pneumococcal 65+ years (1 of 2 - PCV13) 07/12/2018    MEDICARE YEARLY EXAM  10/13/2018     Patient given pnuemococcal vaccine, Prevnar 13, in right deltoid, per verbal order from Dr. Radha Kelly with read back. Instructed patient to sit and wait 10-20 minutes before leaving the premises so that we can watch for any complications or adverse reactions. Patient given vaccine information statement handout before vaccine was given. Patient tolerated well without adverse reactions or complications. 1. Have you been to the ER, urgent care clinic or hospitalized since your last visit? NO.     2. Have you seen or consulted any other health care providers outside of the [address] [place] [address] since your last visit (Include any pap smears or colon screening)?  NO              Learning Assessment 6/4/2019   PRIMARY LEARNER Patient   HIGHEST LEVEL OF EDUCATION - PRIMARY LEARNER  4 YEARS OF COLLEGE   BARRIERS PRIMARY LEARNER NONE   CO-LEARNER CAREGIVER No   PRIMARY LANGUAGE ENGLISH    NEED No   LEARNER PREFERENCE PRIMARY DEMONSTRATION     LISTENING     READING     PICTURES     VIDEOS   ANSWERED BY patient   RELATIONSHIP SELF

## 2019-06-08 ENCOUNTER — TELEPHONE (OUTPATIENT)
Dept: INTERNAL MEDICINE CLINIC | Age: 69
End: 2019-06-08

## 2019-06-08 PROBLEM — R97.20 RISING PSA LEVEL: Status: RESOLVED | Noted: 2018-08-27 | Resolved: 2019-06-08

## 2019-06-08 NOTE — PROGRESS NOTES
HPI:   Oscar Lucio is a 79y.o. year old male who presents today for a routine visit and for evaluation of hypertension, hyperlipidemia, diabetes mellitus, s/p provoked DVT, and GERD. He reports that he is doing very well and is without complaints. He has a history of hypertension, not requiring treatment with medication. He states that he continues to monitor his blood pressure at home and reports that it is ranging 120-130/ 70-80. He states that he has resumed walking for exercise and is trying to perform 10,000 steps per day. He is also active doing yard work. He denies any chest pain, shortness of breath at rest or with exertion, palpitations, lightheadedness, or edema. He also has a history of hyperlipidemia, treated with moderate intensity dose atorvastatin. He has a history of diabetes mellitus, recently diagnosed in 2/2016 with HbA1c of 6.5. Review of chart showed that fasting blood glucose has been elevated since 2010 ranging from 107-128. He was started on metformin in 11/2017, but had difficulty with the ER formulation due to the size of the pill. He was changed to metformin IR in 2/2018 without further difficulties. He denies any polyuria, polydipsia, nocturia, or blurry vision, and has no history of retinopathy, neuropathy, or nephropathy. He has regular eye exams with Dr. Tata Ramírez of [facility] [address] [facility]. He has a history of a provoked DVT, occurring in 8/2012 following an airplane ride home from [place]. Venous duplex scan (9/1/2012) at [facility] showed an acute nonocclusive DVT in the left popliteal vein, and an acute thrombus in the left gastrocnemius and soleus veins. He was treated with Lovenox and transitioned to coumadin and completed three months of therapy. 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  He reports occasional mild swelling in his left foot, but denies any calf pain or tenderness, shortness of breath, or pleuritic chest pain. He has had multiple long plane rides since that time without recurrence. He had an excisional biopsy of a mass in his right posterior upper arm on 6/30/2017 by Dr. Nadeem Hager. Pathology showed an epidermal inclusion cyst.     He has a history of GERD, but uses over the counter antacids occasionally. He also has a reported history of a gastric ulcer, but records are unavailable. He had a screening colonoscopy in 11/2011 by Dr. Cardona Records showing mild diverticulosis, a 2 mm sessile polyp in the mid sigmoid colon (fulgurized), and a 5 mm polyp in the proximal sigmoid colon (pathology unavailable). He had another screening colonoscopy in 3/2017 by Dr. Cardona Records showing a 3 mm sessile descending colon polyp (pathology: tubular adenoma). Follow-up recommended for five years. In 5/2018, he reported two episodes of rectal bleeding occurring with bowel movements. He denied any abdominal pain, melena, change in caliber or character of his stool, fatigue, pallor, pica, or restless leg symptoms. His weight had decreased six pounds, but he had reported trying to lose weight to help with his diabetes mellitus. He was referred to GI and evaluated by [facility], who recommended observation with plans to proceed with flexible sigmoidoscopy if recurs. He reports no recurrence. Past Medical History:   Diagnosis Date    Colon polyps     Diabetes mellitus ([address]     FH: prostate cancer     GERD (gastroesophageal reflux disease)     History of DVT (deep vein thrombosis) 09/2012    Provoked following airplane ride home from [place]. Venous duplex scan showed acute nonocclusive DVT in left popliteal vein; acute thrombosis left gastocnemius and soleus vein.     Hypercholesterolemia     Hypertension     Thromboembolus ([address]     leg  years ago     Past Surgical History:   Procedure Laterality Date    HX GI      colonoscopy x 3    [place] EXCISION TUMOR SOFT TISSUE SHOULDER SUBQ 3+CM Right 06/30/2017    Dr. Elie Mcgregor Medications   Medication Sig    pediatric multivitamin no.76 (FLINTSTONES COMPLETE PO) Take  by mouth.  atorvastatin (LIPITOR) 20 mg tablet TAKE 1 TABLET DAILY    metFORMIN (GLUCOPHAGE) 500 mg tablet TAKE 1 TABLET TWICE A DAY  WITH MEALS    cyanocobalamin (VITAMIN B-12) 1,000 mcg tablet Take 500 mcg by mouth daily.  cholecalciferol (VITAMIN D3) 1,000 unit cap Take 1,000 Units by mouth daily. No current facility-administered medications for this visit. Allergies and Intolerances:   No Known Allergies     Family History: His mother is alive and has DM. His father  of a stroke at age 80. He also had prostate cancer. His brother has prostate cancer metastatic to bone. Family History   Problem Relation Age of Onset    Diabetes Mother     Cancer Father     Stroke Father     Hypertension Father     Diabetes Brother      Social History:   He  reports that he quit smoking about 20 years ago. He has never used smokeless tobacco. He smoked 0.5 ppd for 20 years, stopping in . He is  and has one living son, and one son who is . 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 Social History     Substance and Sexual Activity   Alcohol Use No     Immunization History:  Immunization History   Administered Date(s) Administered    Pneumococcal Conjugate (PCV-13) 2019    Pneumococcal Polysaccharide (PPSV-23) 2017    TD Vaccine 2007    Tdap 2017    Zoster Vaccine, Live 2013       Review of Systems:   As above included in HPI. Otherwise 11 point review of systems negative including constitutional, skin, HENT, eyes, respiratory, cardiovascular, gastrointestinal, genitourinary, musculoskeletal, endocrine, hematologic, allergy, and neurologic. Physical:   Vitals:   BP: 118/78   HR: 75  WT: 198 lb 9.6 oz (90.1 kg)  BMI:  25.76 kg/m2    Exam:   Patient appears in no apparent distress. Affect is appropriate.     HEENT: PERRLA, anicteric, oropharynx clear, no JVD, adenopathy or thyromegaly. No carotid bruits or radiated murmur. Lungs: clear to auscultation, no wheezes, rhonchi, or rales. Heart: regular rate and rhythm. No murmur, rubs, gallops  Abdomen: soft, nontender, nondistended, normal bowel sounds, no hepatosplenomegaly or masses. Extremities: without edema. Pulses 1-2+ bilaterally. Review of Data:  Labs:  Hospital Outpatient Visit on 05/30/2019   Component Date Value Ref Range Status    Hemoglobin A1c 05/30/2019 6.7* 4.2 - 5.6 % Final    Est. average glucose 05/30/2019 146  mg/dL Final    LIPID PROFILE 05/30/2019        Final    Cholesterol, total 05/30/2019 148  <200 MG/DL Final    Triglyceride 05/30/2019 104  <150 MG/DL Final    HDL Cholesterol 05/30/2019 46  40 - 60 MG/DL Final    LDL, calculated 05/30/2019 81.2  0 - 100 MG/DL Final    VLDL, calculated 05/30/2019 20.8  MG/DL Final    CHOL/HDL Ratio 05/30/2019 3.2  0 - 5.0   Final    WBC 05/30/2019 4.5* 4.6 - 13.2 K/uL Final    RBC 05/30/2019 5.19  4.70 - 5.50 M/uL Final    HGB 05/30/2019 13.7  13.0 - 16.0 g/dL Final    HCT 05/30/2019 44.6  36.0 - 48.0 % Final    MCV 05/30/2019 85.9  74.0 - 97.0 FL Final    MCH 05/30/2019 26.4  24.0 - 34.0 PG Final    MCHC 05/30/2019 30.7* 31.0 - 37.0 g/dL Final    RDW 05/30/2019 13.3  11.6 - 14.5 % Final    PLATELET 95/56/3769 178  135 - 420 K/uL Final    MPV 05/30/2019 10.1  9.2 - 11.8 FL Final    NEUTROPHILS 05/30/2019 47  40 - 73 % Final    LYMPHOCYTES 05/30/2019 45  21 - 52 % Final    MONOCYTES 05/30/2019 7  3 - 10 % Final    EOSINOPHILS 05/30/2019 1  0 - 5 % Final    BASOPHILS 05/30/2019 0  0 - 2 % Final    ABS. NEUTROPHILS 05/30/2019 2.1  1.8 - 8.0 K/UL Final    ABS. LYMPHOCYTES 05/30/2019 2.1  0.9 - 3.6 K/UL Final    ABS. MONOCYTES 05/30/2019 0.3  0.05 - 1.2 K/UL Final    ABS. EOSINOPHILS 05/30/2019 0.1  0.0 - 0.4 K/UL Final    ABS.  BASOPHILS 05/30/2019 0.0  0.0 - 0.1 K/UL Final    DF 05/30/2019 AUTOMATED Final    Sodium 05/30/2019 142  136 - 145 mmol/L Final    Potassium 05/30/2019 4.7  3.5 - 5.5 mmol/L Final    Chloride 05/30/2019 107  100 - 108 mmol/L Final    CO2 05/30/2019 28  21 - 32 mmol/L Final    Anion gap 05/30/2019 7  3.0 - 18 mmol/L Final    Glucose 05/30/2019 129* 74 - 99 mg/dL Final    BUN 05/30/2019 16  7.0 - 18 MG/DL Final    Creatinine 05/30/2019 1.03  0.6 - 1.3 MG/DL Final    BUN/Creatinine ratio 05/30/2019 16  12 - 20   Final    GFR est AA 05/30/2019 >60  >60 ml/min/1.73m2 Final    GFR est non-AA 05/30/2019 >60  >60 ml/min/1.73m2 Final    Calcium 05/30/2019 8.9  8.5 - 10.1 MG/DL Final    Prostate Specific Ag 05/30/2019 0.9  0.0 - 4.0 ng/mL Final     Health Maintenance:  Screening:    Colorectal: colonoscopy (3/2017) tubular adenoma. Dr. Tom Dunn. Due 2022. Depression: none   DM (HbA1c/FPG): HbA1c 6.7 (5/2019)   Hepatitis C: negative (2/2016)   Falls: none   DEXA: N/A   PSA/ANU: PSA 0.9 (5/2019)/ ANU (8/2018)   Glaucoma: regular eye exams with Dr. Singh Maurer of [facility] (last 1/2019)   Smoking: distant past   Vitamin D: 29.4 (8/2018)   Medicare Wellness: today    Impression:  Patient Active Problem List   Diagnosis Code    Hyperlipidemia E78.5    Family history of prostate cancer Z80.42    Colon polyps K63.5    Gastric ulcer K25.9    H/O deep venous thrombosis Z86.718    Diabetes mellitus ([facility] E11.9    Essential hypertension I10    GERD (gastroesophageal reflux disease) K21.9    Vitamin D insufficiency E55.9    Rising PSA level R97.20       Plan:  1. Diabetes mellitus. HbA1c stable at 6.7 on metformin  mg with breakfast and dinner. He has now lost a total of 15 pounds since 2/2018, although had gained a total of 21 pounds since retiring. He has no current evidence of microvascular complications. Urine microalbumin/ creatinine ratio without evidence of microalbuminuria, and foot exam normal (8/2018). On statin, but not on Ace-I or ARB given now normotensive.  Continue follow-up for annual eye exams. Emphasized importance of continued lifestyle modifications, including diet, exercise, and weight loss. Continue to follow. 2. Rectal bleeding. Two episodes over two month span. Description most consistent with hemorrhoidal source. Recent colonoscopy in 3/2017 with only a single tubular adenoma removed. CBC and iron studies normal. Underwent reevaluation by GI, and did not feel further testing warranted unless recurs as felt most likely anorectal source. Patient reports no recurrence. Follow. 3. Hypertension. Well controlled in office today. Renal function remains normal with creatinine 1.03/ eGFR >60. Continue to follow. Abdominal ultrasound negative for aneurysm (10/2017). 4. Hyperlipidemia. Previously on low intensity dose simvastatin with . Changed to moderate intensity dose atorvastatin 20 mg daily with improvement to LDL to 81 and HDL 46, indicative of reasonable control. Emphasized importance of lifestyle modifications, including diet, exercise, and weight loss. 5. Rising PSA. PSA increased to 2.5 in 8/2018, but now returned to normal at 0.9 today. Strong family history of prostate cancer. Will recheck PSA in 6 months at upcoming physical. ANU negative last visit. Follow closely. 6. H/O provoked DVT. No evidence of recurrence since 2012. Continue to follow. 7. GERD. Symptomatic control with over the counter antacids. Follow. 8. Health maintenance. Given script for Shingrix vaccine previously, but not yet obtained. Already received pneumovax. Will give Prevnar 13 today. Other immunizations up to date. Colonoscopy due 2022. Prostate cancer screening up to date as above. Continue annual eye exams. Vitamin D level normal. Continue maintenance dose supplement. In addition, an initiall Medicare wellness visit was done today. Patient understands recommendations and agrees with plan.   Follow-up in 6 months for physical.

## 2019-09-18 RX ORDER — METFORMIN HYDROCHLORIDE 500 MG/1
TABLET ORAL
Qty: 180 TAB | Refills: 2 | Status: SHIPPED | OUTPATIENT
Start: 2019-09-18 | End: 2020-06-11

## 2019-12-05 ENCOUNTER — OFFICE VISIT (OUTPATIENT)
Dept: INTERNAL MEDICINE CLINIC | Age: 69
End: 2019-12-05

## 2019-12-05 ENCOUNTER — HOSPITAL ENCOUNTER (OUTPATIENT)
Dept: LAB | Age: 69
Discharge: HOME | End: 2019-12-05
Payer: MEDICARE

## 2019-12-05 VITALS
SYSTOLIC BLOOD PRESSURE: 132 MMHG | RESPIRATION RATE: 16 BRPM | HEART RATE: 67 BPM | OXYGEN SATURATION: 100 % | BODY MASS INDEX: 26.64 KG/M2 | WEIGHT: 201 LBS | TEMPERATURE: 98 F | DIASTOLIC BLOOD PRESSURE: 80 MMHG | HEIGHT: 73 IN

## 2019-12-05 DIAGNOSIS — Z23 ENCOUNTER FOR IMMUNIZATION: ICD-10-CM

## 2019-12-05 DIAGNOSIS — E55.9 VITAMIN D INSUFFICIENCY: ICD-10-CM

## 2019-12-05 DIAGNOSIS — D12.4 ADENOMATOUS POLYP OF DESCENDING COLON: ICD-10-CM

## 2019-12-05 DIAGNOSIS — E11.9 TYPE 2 DIABETES MELLITUS WITHOUT COMPLICATION, WITHOUT LONG-TERM CURRENT USE OF INSULIN (HCC): Primary | ICD-10-CM

## 2019-12-05 DIAGNOSIS — E11.9 TYPE 2 DIABETES MELLITUS WITHOUT COMPLICATION, WITHOUT LONG-TERM CURRENT USE OF INSULIN (HCC): ICD-10-CM

## 2019-12-05 DIAGNOSIS — Z86.718 H/O DEEP VENOUS THROMBOSIS: ICD-10-CM

## 2019-12-05 DIAGNOSIS — E78.5 HYPERLIPIDEMIA, UNSPECIFIED HYPERLIPIDEMIA TYPE: ICD-10-CM

## 2019-12-05 DIAGNOSIS — K21.9 GASTROESOPHAGEAL REFLUX DISEASE, ESOPHAGITIS PRESENCE NOT SPECIFIED: ICD-10-CM

## 2019-12-05 DIAGNOSIS — Z12.5 ENCOUNTER FOR SCREENING FOR MALIGNANT NEOPLASM OF PROSTATE: ICD-10-CM

## 2019-12-05 DIAGNOSIS — Z80.42 FAMILY HISTORY OF PROSTATE CANCER: ICD-10-CM

## 2019-12-05 DIAGNOSIS — I10 ESSENTIAL HYPERTENSION: ICD-10-CM

## 2019-12-05 LAB
ALBUMIN SERPL-MCNC: 3.9 G/DL (ref 3.4–5)
ALBUMIN/GLOB SERPL: 1.3 {RATIO} (ref 0.8–1.7)
ALP SERPL-CCNC: 78 U/L (ref 45–117)
ALT SERPL-CCNC: 28 U/L (ref 16–61)
ANION GAP SERPL CALC-SCNC: 4 MMOL/L (ref 3–18)
APPEARANCE UR: CLEAR
AST SERPL-CCNC: 15 U/L (ref 10–38)
BACTERIA URNS QL MICRO: NEGATIVE /HPF
BASOPHILS # BLD: 0 K/UL (ref 0–0.1)
BASOPHILS NFR BLD: 0 % (ref 0–2)
BILIRUB SERPL-MCNC: 0.5 MG/DL (ref 0.2–1)
BILIRUB UR QL: NEGATIVE
BUN SERPL-MCNC: 17 MG/DL (ref 7–18)
BUN/CREAT SERPL: 17 (ref 12–20)
CALCIUM SERPL-MCNC: 9 MG/DL (ref 8.5–10.1)
CHLORIDE SERPL-SCNC: 109 MMOL/L (ref 100–111)
CO2 SERPL-SCNC: 28 MMOL/L (ref 21–32)
COLOR UR: YELLOW
CREAT SERPL-MCNC: 1.03 MG/DL (ref 0.6–1.3)
DIFFERENTIAL METHOD BLD: ABNORMAL
EOSINOPHIL # BLD: 0.1 K/UL (ref 0–0.4)
EOSINOPHIL NFR BLD: 2 % (ref 0–5)
EPITH CASTS URNS QL MICRO: NORMAL /LPF (ref 0–5)
ERYTHROCYTE [DISTWIDTH] IN BLOOD BY AUTOMATED COUNT: 13.2 % (ref 11.6–14.5)
GLOBULIN SER CALC-MCNC: 3 G/DL (ref 2–4)
GLUCOSE SERPL-MCNC: 124 MG/DL (ref 74–99)
GLUCOSE UR STRIP.AUTO-MCNC: NEGATIVE MG/DL
HCT VFR BLD AUTO: 42.4 % (ref 36–48)
HGB BLD-MCNC: 13.1 G/DL (ref 13–16)
HGB UR QL STRIP: NEGATIVE
KETONES UR QL STRIP.AUTO: NEGATIVE MG/DL
LEUKOCYTE ESTERASE UR QL STRIP.AUTO: NEGATIVE
LYMPHOCYTES # BLD: 1.5 K/UL (ref 0.9–3.6)
LYMPHOCYTES NFR BLD: 38 % (ref 21–52)
MCH RBC QN AUTO: 26.8 PG (ref 24–34)
MCHC RBC AUTO-ENTMCNC: 30.9 G/DL (ref 31–37)
MCV RBC AUTO: 86.9 FL (ref 74–97)
MONOCYTES # BLD: 0.3 K/UL (ref 0.05–1.2)
MONOCYTES NFR BLD: 8 % (ref 3–10)
NEUTS SEG # BLD: 2.1 K/UL (ref 1.8–8)
NEUTS SEG NFR BLD: 52 % (ref 40–73)
NITRITE UR QL STRIP.AUTO: NEGATIVE
PH UR STRIP: 5.5 [PH] (ref 5–8)
PLATELET # BLD AUTO: 221 K/UL (ref 135–420)
PMV BLD AUTO: 10.6 FL (ref 9.2–11.8)
POTASSIUM SERPL-SCNC: 3.9 MMOL/L (ref 3.5–5.5)
PROT SERPL-MCNC: 6.9 G/DL (ref 6.4–8.2)
PROT UR STRIP-MCNC: NEGATIVE MG/DL
RBC # BLD AUTO: 4.88 M/UL (ref 4.7–5.5)
RBC #/AREA URNS HPF: 0 /HPF (ref 0–5)
SODIUM SERPL-SCNC: 141 MMOL/L (ref 136–145)
SP GR UR REFRACTOMETRY: 1.03 (ref 1–1.03)
TSH SERPL DL<=0.05 MIU/L-ACNC: 0.73 UIU/ML (ref 0.36–3.74)
UROBILINOGEN UR QL STRIP.AUTO: 0.2 EU/DL (ref 0.2–1)
WBC # BLD AUTO: 3.9 K/UL (ref 4.6–13.2)
WBC URNS QL MICRO: NORMAL /HPF (ref 0–4)

## 2019-12-05 PROCEDURE — 81001 URINALYSIS AUTO W/SCOPE: CPT

## 2019-12-05 PROCEDURE — 80053 COMPREHEN METABOLIC PANEL: CPT

## 2019-12-05 PROCEDURE — 82043 UR ALBUMIN QUANTITATIVE: CPT

## 2019-12-05 PROCEDURE — 82306 VITAMIN D 25 HYDROXY: CPT

## 2019-12-05 PROCEDURE — 80061 LIPID PANEL: CPT

## 2019-12-05 PROCEDURE — 85025 COMPLETE CBC W/AUTO DIFF WBC: CPT

## 2019-12-05 PROCEDURE — 84443 ASSAY THYROID STIM HORMONE: CPT

## 2019-12-05 PROCEDURE — 84153 ASSAY OF PSA TOTAL: CPT

## 2019-12-05 PROCEDURE — 83036 HEMOGLOBIN GLYCOSYLATED A1C: CPT

## 2019-12-05 NOTE — PROGRESS NOTES
Chief Complaint   Patient presents with    Diabetes     6 month follow up      Health Maintenance Due   Topic Date Due    Shingrix Vaccine Age 49> (1 of 2) 08/31/2002    Influenza Age 5 to Adult  08/01/2019    MICROALBUMIN Q1  08/15/2019    FOOT EXAM Q1  08/22/2019    HEMOGLOBIN A1C Q6M  11/30/2019     Patient states he doesn't want to get his flu vaccine right now. 1. Have you been to the ER, urgent care clinic or hospitalized since your last visit? NO.     2. Have you seen or consulted any other health care providers outside of the [address] [place] [address] since your last visit (Include any pap smears or colon screening)? NO      Do you have an Advanced Directive? NO    Would you like information on Advanced Directives? NO    Learning Assessment 6/4/2019   PRIMARY LEARNER Patient   HIGHEST LEVEL OF EDUCATION - PRIMARY LEARNER  4 YEARS OF COLLEGE   BARRIERS PRIMARY LEARNER NONE   CO-LEARNER CAREGIVER No   PRIMARY LANGUAGE ENGLISH    NEED No   LEARNER PREFERENCE PRIMARY DEMONSTRATION     LISTENING     READING     PICTURES     VIDEOS   ANSWERED BY patient   RELATIONSHIP SELF     Abuse Screening Questionnaire 12/5/2019   Do you ever feel afraid of your partner? N   Are you in a relationship with someone who physically or mentally threatens you? N   Is it safe for you to go home? Y     3 most recent PHQ Screens 12/5/2019   Little interest or pleasure in doing things Not at all   Feeling down, depressed, irritable, or hopeless Not at all   Total Score PHQ 2 0     Fall Risk Assessment, last 12 mths 12/5/2019   Able to walk? Yes   Fall in past 12 months?  No

## 2019-12-05 NOTE — PATIENT INSTRUCTIONS
Please monitor and record your blood pressure every morning and evening for the next 2 weeks. Please deliver record of readings to our office for review if >130/80. Learning About Meal Planning for Diabetes  Why plan your meals? Meal planning can be a key part of managing diabetes. Planning meals and snacks with the right balance of carbohydrate, protein, and fat can help you keep your blood sugar at the target level you set with your doctor. You don't have to eat special foods. You can eat what your family eats, including sweets once in a while. But you do have to pay attention to how often you eat and how much you eat of certain foods. You may want to work with a dietitian or a certified diabetes educator. He or she can give you tips and meal ideas and can answer your questions about meal planning. This health professional can also help you reach a healthy weight if that is one of your goals. What plan is right for you? Your dietitian or diabetes educator may suggest that you start with the plate format or carbohydrate counting. The plate format  The plate format is a simple way to help you manage how you eat. You plan meals by learning how much space each food should take on a plate. Using the plate format helps you spread carbohydrate throughout the day. It can make it easier to keep your blood sugar level within your target range. It also helps you see if you're eating healthy portion sizes. To use the plate format, you put non-starchy vegetables on half your plate. Add meat or meat substitutes on one-quarter of the plate. Put a grain or starchy vegetable (such as brown rice or a potato) on the final quarter of the plate. You can add a small piece of fruit and some low-fat or fat-free milk or yogurt, depending on your carbohydrate goal for each meal.  Here are some tips for using the plate format:  · Make sure that you are not using an oversized plate.  A 9-inch plate is best. Many restaurants use larger plates. · Get used to using the plate format at home. Then you can use it when you eat out. · Write down your questions about using the plate format. Talk to your doctor, a dietitian, or a diabetes educator about your concerns. Carbohydrate counting  With carbohydrate counting, you plan meals based on the amount of carbohydrate in each food. Carbohydrate raises blood sugar higher and more quickly than any other nutrient. It is found in desserts, breads and cereals, and fruit. It's also found in starchy vegetables such as potatoes and corn, grains such as rice and pasta, and milk and yogurt. Spreading carbohydrate throughout the day helps keep your blood sugar levels within your target range. Your daily amount depends on several things, including your weight, how active you are, which diabetes medicines you take, and what your goals are for your blood sugar levels. A registered dietitian or diabetes educator can help you plan how much carbohydrate to include in each meal and snack. A guideline for your daily amount of carbohydrate is:  · 45 to 60 grams at each meal. That's about the same as 3 to 4 carbohydrate servings. · 15 to 20 grams at each snack. That's about the same as 1 carbohydrate serving. The Nutrition Facts label on packaged foods tells you how much carbohydrate is in a serving of the food. First, look at the serving size on the food label. Is that the amount you eat in a serving? All of the nutrition information on a food label is based on that serving size. So if you eat more or less than that, you'll need to adjust the other numbers. Total carbohydrate is the next thing you need to look for on the label. If you count carbohydrate servings, one serving of carbohydrate is 15 grams. For foods that don't come with labels, such as fresh fruits and vegetables, you'll need a guide that lists carbohydrate in these foods.  Ask your doctor, dietitian, or diabetes educator about books or other nutrition guides you can use. If you take insulin, you need to know how many grams of carbohydrate are in a meal. This lets you know how much rapid-acting insulin to take before you eat. If you use an insulin pump, you get a constant rate of insulin during the day. So the pump must be programmed at meals to give you extra insulin to cover the rise in blood sugar after meals. When you know how much carbohydrate you will eat, you can take the right amount of insulin. Or, if you always use the same amount of insulin, you need to make sure that you eat the same amount of carbohydrate at meals. If you need more help to understand carbohydrate counting and food labels, ask your doctor, dietitian, or diabetes educator. How do you get started with meal planning? Here are some tips to get started:  · Plan your meals a week at a time. Don't forget to include snacks too. · Use cookbooks or online recipes to plan several main meals. Plan some quick meals for busy nights. You also can double some recipes that freeze well. Then you can save half for other busy nights when you don't have time to cook. · Make sure you have the ingredients you need for your recipes. If you're running low on basic items, put these items on your shopping list too. · List foods that you use to make breakfasts, lunches, and snacks. List plenty of fruits and vegetables. · Post this list on the refrigerator. Add to it as you think of more things you need. · Take the list to the store to do your weekly shopping. Follow-up care is a key part of your treatment and safety. Be sure to make and go to all appointments, and call your doctor if you are having problems. It's also a good idea to know your test results and keep a list of the medicines you take. Where can you learn more? Go to http://yanet-sergio.info/. Ze Perry in the search box to learn more about \"Learning About Meal Planning for Diabetes. \"  Current as of: April 16, 2019  Content Version: 12.2  © 5881-9973 [employer]. Care instructions adapted under license by [employer] (which disclaims liability or warranty for this information). If you have questions about a medical condition or this instruction, always ask your healthcare professional. [address] any warranty or liability for your use of this information.

## 2019-12-06 ENCOUNTER — TELEPHONE (OUTPATIENT)
Dept: INTERNAL MEDICINE CLINIC | Age: 69
End: 2019-12-06

## 2019-12-06 LAB
25(OH)D3 SERPL-MCNC: 40.8 NG/ML (ref 30–100)
CHOLEST SERPL-MCNC: 173 MG/DL
CREAT UR-MCNC: 169 MG/DL (ref 30–125)
EST. AVERAGE GLUCOSE BLD GHB EST-MCNC: 134 MG/DL
HBA1C MFR BLD: 6.3 % (ref 4.2–5.6)
HDLC SERPL-MCNC: 46 MG/DL (ref 40–60)
HDLC SERPL: 3.8 {RATIO} (ref 0–5)
LDLC SERPL CALC-MCNC: 108.6 MG/DL (ref 0–100)
LIPID PROFILE,FLP: ABNORMAL
MICROALBUMIN UR-MCNC: 1.84 MG/DL (ref 0–3)
MICROALBUMIN/CREAT UR-RTO: 11 MG/G (ref 0–30)
PSA SERPL-MCNC: 1.2 NG/ML (ref 0–4)
TRIGL SERPL-MCNC: 92 MG/DL (ref ?–150)
VLDLC SERPL CALC-MCNC: 18.4 MG/DL

## 2019-12-06 NOTE — TELEPHONE ENCOUNTER
Hospital Outpatient Visit on 12/05/2019   Component Date Value Ref Range Status    WBC 12/05/2019 3.9* 4.6 - 13.2 K/uL Final    RBC 12/05/2019 4.88  4.70 - 5.50 M/uL Final    HGB 12/05/2019 13.1  13.0 - 16.0 g/dL Final    HCT 12/05/2019 42.4  36.0 - 48.0 % Final    MCV 12/05/2019 86.9  74.0 - 97.0 FL Final    MCH 12/05/2019 26.8  24.0 - 34.0 PG Final    MCHC 12/05/2019 30.9* 31.0 - 37.0 g/dL Final    RDW 12/05/2019 13.2  11.6 - 14.5 % Final    PLATELET 13/13/1448 970  135 - 420 K/uL Final    MPV 12/05/2019 10.6  9.2 - 11.8 FL Final    NEUTROPHILS 12/05/2019 52  40 - 73 % Final    LYMPHOCYTES 12/05/2019 38  21 - 52 % Final    MONOCYTES 12/05/2019 8  3 - 10 % Final    EOSINOPHILS 12/05/2019 2  0 - 5 % Final    BASOPHILS 12/05/2019 0  0 - 2 % Final    ABS. NEUTROPHILS 12/05/2019 2.1  1.8 - 8.0 K/UL Final    ABS. LYMPHOCYTES 12/05/2019 1.5  0.9 - 3.6 K/UL Final    ABS. MONOCYTES 12/05/2019 0.3  0.05 - 1.2 K/UL Final    ABS. EOSINOPHILS 12/05/2019 0.1  0.0 - 0.4 K/UL Final    ABS.  BASOPHILS 12/05/2019 0.0  0.0 - 0.1 K/UL Final    DF 12/05/2019 AUTOMATED    Final    Hemoglobin A1c 12/05/2019 6.3* 4.2 - 5.6 % Final    Est. average glucose 12/05/2019 134  mg/dL Final    LIPID PROFILE 12/05/2019        Final    Cholesterol, total 12/05/2019 173  <200 MG/DL Final    Triglyceride 12/05/2019 92  <150 MG/DL Final    HDL Cholesterol 12/05/2019 46  40 - 60 MG/DL Final    LDL, calculated 12/05/2019 108.6* 0 - 100 MG/DL Final    VLDL, calculated 12/05/2019 18.4  MG/DL Final    CHOL/HDL Ratio 12/05/2019 3.8  0 - 5.0   Final    Sodium 12/05/2019 141  136 - 145 mmol/L Final    Potassium 12/05/2019 3.9  3.5 - 5.5 mmol/L Final    Chloride 12/05/2019 109  100 - 111 mmol/L Final    CO2 12/05/2019 28  21 - 32 mmol/L Final    Anion gap 12/05/2019 4  3.0 - 18 mmol/L Final    Glucose 12/05/2019 124* 74 - 99 mg/dL Final    BUN 12/05/2019 17  7.0 - 18 MG/DL Final    Creatinine 12/05/2019 1.03  0.6 - 1.3 MG/DL Final    BUN/Creatinine ratio 12/05/2019 17  12 - 20   Final    GFR est AA 12/05/2019 >60  >60 ml/min/1.73m2 Final    GFR est non-AA 12/05/2019 >60  >60 ml/min/1.73m2 Final    Calcium 12/05/2019 9.0  8.5 - 10.1 MG/DL Final    Bilirubin, total 12/05/2019 0.5  0.2 - 1.0 MG/DL Final    ALT (SGPT) 12/05/2019 28  16 - 61 U/L Final    AST (SGOT) 12/05/2019 15  10 - 38 U/L Final    Alk. phosphatase 12/05/2019 78  45 - 117 U/L Final    Protein, total 12/05/2019 6.9  6.4 - 8.2 g/dL Final    Albumin 12/05/2019 3.9  3.4 - 5.0 g/dL Final    Globulin 12/05/2019 3.0  2.0 - 4.0 g/dL Final    A-G Ratio 12/05/2019 1.3  0.8 - 1.7   Final    Prostate Specific Ag 12/05/2019 1.2  0.0 - 4.0 ng/mL Final    TSH 12/05/2019 0.73  0.36 - 3.74 uIU/mL Final    Color 12/05/2019 YELLOW    Final    Appearance 12/05/2019 CLEAR    Final    Specific gravity 12/05/2019 1.026  1.005 - 1.030   Final    pH (UA) 12/05/2019 5.5  5.0 - 8.0   Final    Protein 12/05/2019 NEGATIVE   NEG mg/dL Final    Glucose 12/05/2019 NEGATIVE   NEG mg/dL Final    Ketone 12/05/2019 NEGATIVE   NEG mg/dL Final    Bilirubin 12/05/2019 NEGATIVE   NEG   Final    Blood 12/05/2019 NEGATIVE   NEG   Final    Urobilinogen 12/05/2019 0.2  0.2 - 1.0 EU/dL Final    Nitrites 12/05/2019 NEGATIVE   NEG   Final    Leukocyte Esterase 12/05/2019 NEGATIVE   NEG   Final    WBC 12/05/2019 0 to 2  0 - 4 /hpf Final    RBC 12/05/2019 0  0 - 5 /hpf Final    Epithelial cells 12/05/2019 FEW  0 - 5 /lpf Final    Bacteria 12/05/2019 NEGATIVE   NEG /hpf Final    Vitamin D 25-Hydroxy 12/05/2019 40.8  30 - 100 ng/mL Final    Microalbumin,urine random 12/05/2019 1.84  0 - 3.0 MG/DL Final    Creatinine, urine 12/05/2019 169.00* 30 - 125 mg/dL Final    Microalbumin/Creat ratio (mg/g cre* 12/05/2019 11  0 - 30 mg/g Final     Reviewed labs from visit. Please let the patient know the following:    CBC normal without evidence of anemia.      Renal and liver function are normal. Urine also shows no microalbumin, which is normal.    Thyroid function is normal and Vitamin D level is normal.     Urinalysis without evidence of protein or blood. Lipid panel with total chol 173, HDL 46, and , which is not well controlled on current dose of atorvastatin. Please confirm that he is compliant with his daily dose. If he is taking it as prescribed, would recommend increasing dose to 40 mg daily. If not, would advise him to take daily since at increased risk of heart disease with diabetes. Goal LDL <70. Please advise if he needs a new prescription and which dose. HbA1c is 6.3 which is reasonably well controlled on current dose of metformin. Would continue to recommend avoiding concentrated sweets, and limiting carbohydrates. Would also recommend increasing exercise and weight loss. PSA 1.2 which is slightly increased from last level of 0.9, but remains in normal range and was at similar level in 2017, so not of concern.

## 2019-12-09 RX ORDER — ATORVASTATIN CALCIUM 20 MG/1
20 TABLET, FILM COATED ORAL DAILY
Qty: 90 TAB | Refills: 3 | Status: SHIPPED | OUTPATIENT
Start: 2019-12-09 | End: 2019-12-09 | Stop reason: DRUGHIGH

## 2019-12-09 RX ORDER — ATORVASTATIN CALCIUM 40 MG/1
40 TABLET, FILM COATED ORAL DAILY
Qty: 90 TAB | Refills: 2 | Status: SHIPPED | OUTPATIENT
Start: 2019-12-09 | End: 2020-08-23

## 2019-12-09 NOTE — PROGRESS NOTES
HPI:   Suzanne Desai is a 81y.o. year old male who presents today for a physical exam and for evaluation of hypertension, hyperlipidemia, diabetes mellitus, s/p provoked DVT, and GERD. He reports that he is doing very well and is without complaints. He states that he is continuing to walk daily for exercise. He has a history of hypertension, not requiring treatment with medication. He states that he continues to monitor his blood pressure at home and reports that it is ranging 120-130/ 70-80. He states that he has resumed walking for exercise and is trying to perform 10,000 steps per day. He is also active doing yard work. He denies any chest pain, shortness of breath at rest or with exertion, palpitations, lightheadedness, or edema. He also has a history of hyperlipidemia, treated with moderate intensity dose atorvastatin. He has a history of diabetes mellitus, recently diagnosed in 2/2016 with HbA1c of 6.5. Review of chart showed that fasting blood glucose has been elevated since 2010 ranging from 107-128. He was started on metformin in 11/2017, but had difficulty with the ER formulation due to the size of the pill. He was changed to metformin IR in 2/2018 without further difficulties. He denies any polyuria, polydipsia, nocturia, or blurry vision, and has no history of retinopathy, neuropathy, or nephropathy. He has regular eye exams with Dr. Alayna Harris of [facility] on [address] [facility]. He has a history of a provoked DVT, occurring in 8/2012 following an airplane ride home from [place]. Venous duplex scan (9/1/2012) at [facility] showed an acute nonocclusive DVT in the left popliteal vein, and an acute thrombus in the left gastrocnemius and soleus veins. He was treated with Lovenox and transitioned to coumadin and completed three months of therapy. 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  He reports occasional mild swelling in his left foot, but denies any calf pain or tenderness, shortness of breath, or pleuritic chest pain. He has had multiple long plane rides since that time without recurrence. He had an excisional biopsy of a mass in his right posterior upper arm on 6/30/2017 by Dr. Shanel Teresa. Pathology showed an epidermal inclusion cyst.     He has a history of GERD, but uses over the counter antacids occasionally. He also has a reported history of a gastric ulcer, but records are unavailable. He had a screening colonoscopy in 11/2011 by Dr. Nusrat Cuevas showing mild diverticulosis, a 2 mm sessile polyp in the mid sigmoid colon (fulgurized), and a 5 mm polyp in the proximal sigmoid colon (pathology unavailable). He had another screening colonoscopy in 3/2017 by Dr. Nusrat Cuevas showing a 3 mm sessile descending colon polyp (pathology: tubular adenoma). Follow-up recommended for five years. In 5/2018, he reported two episodes of rectal bleeding occurring with bowel movements. He denied any abdominal pain, melena, change in caliber or character of his stool, fatigue, pallor, pica, or restless leg symptoms. His weight had decreased six pounds, but he had reported trying to lose weight to help with his diabetes mellitus. He was referred to GI and evaluated by [facility], who recommended observation with plans to proceed with flexible sigmoidoscopy if recurs. He reports no recurrence. Past Medical History:   Diagnosis Date    Colon polyps     Diabetes mellitus ([address]     FH: prostate cancer     GERD (gastroesophageal reflux disease)     History of DVT (deep vein thrombosis) 09/2012    Provoked following airplane ride home from [place]. Venous duplex scan showed acute nonocclusive DVT in left popliteal vein; acute thrombosis left gastocnemius and soleus vein.     Hypercholesterolemia     Hypertension     Thromboembolus ([address]     leg  years ago     Past Surgical History:   Procedure Laterality Date    HX GI      colonoscopy x 3    [facility] EXCISION TUMOR SOFT TISSUE SHOULDER SUBQ 3+CM Right 2017    Dr. Sabrina Flores     Current Outpatient Medications   Medication Sig    metFORMIN (GLUCOPHAGE) 500 mg tablet TAKE 1 TABLET TWICE A DAY  WITH MEALS    pediatric multivitamin no.76 (FLINTSTONES COMPLETE PO) Take  by mouth.  atorvastatin (LIPITOR) 20 mg tablet TAKE 1 TABLET DAILY    cyanocobalamin (VITAMIN B-12) 1,000 mcg tablet Take 500 mcg by mouth daily.  cholecalciferol (VITAMIN D3) 1,000 unit cap Take 1,000 Units by mouth daily. No current facility-administered medications for this visit. Allergies and Intolerances:   No Known Allergies     Family History: His mother is alive and has DM. His father  of a stroke at age 80. He also had prostate cancer. His brother has prostate cancer metastatic to bone. Family History   Problem Relation Age of Onset    Diabetes Mother     Cancer Father     Stroke Father     Hypertension Father     Diabetes Brother      Social History:   He  reports that he quit smoking about 20 years ago. He has a 7.50 pack-year smoking history. He has never used smokeless tobacco. He smoked 0.5 ppd for 20 years, stopping in . He is  and has one living son, and one son who is . 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 Social History     Substance and Sexual Activity   Alcohol Use No     Immunization History:  Immunization History   Administered Date(s) Administered    Pneumococcal Conjugate (PCV-13) 2019    Pneumococcal Polysaccharide (PPSV-23) 2017    TD Vaccine 2007    Tdap 2017    Zoster Vaccine, Live 2013       Review of Systems:   As above included in HPI. Otherwise 11 point review of systems negative including constitutional, skin, HENT, eyes, respiratory, cardiovascular, gastrointestinal, genitourinary, musculoskeletal, endocrine, hematologic, allergy, and neurologic.     Physical:   Vitals:   BP: 132/80   HR: 67  WT: 201 lb (91.2 kg)  BMI:  26.52 kg/m2    Exam: Patient appears in no apparent distress. Affect is appropriate. HEENT --Anicteric sclerae, tympanic membranes normal,  ear canals normal.  PERRL, EOMI, conjunctiva and lids normal.   Sinuses were nontender, turbinates normal, hearing normal.  Oropharynx without  erythema, normal tongue, oral mucosa and tonsils. No cervical lymphadenopathy. No thyromegaly, JVD, or bruits. Carotid pulses 2+ with normal upstroke. Lungs --Clear to auscultation. No wheezing or rales. Heart --Regular rate and rhythm, no murmurs, rubs, gallops, or clicks. Chest wall --Nontender to palpation. PMI normal.  Abdomen -- Soft and nontender, no hepatosplenomegaly or masses. Prostate  -- no asymmetry, nodularity, tenderness or enlargement  Rectal  -- normal tone, guaiac negative brown stool  Extremities -- Without cyanosis, clubbing, edema. 2+ pulses equally and bilaterally. Normal looking digits, ROM intact  Neuro -- CN 2-12 intact, strength 5/5 with intact soft touch in all extremities, cerebellar  exam finger to nose test normal, 2+DTRs  Derm - no obvious abnormalities noted, no rash    Diabetic foot exam:     Left Foot:   Visual Exam: normal    Pulse DP: 2+ (normal)   Filament test: normal sensation    Vibratory sensation: normal      Right Foot:   Visual Exam: normal    Pulse DP: 2+ (normal)   Filament test: normal sensation    Vibratory sensation: normal        Review of Data:  Labs:  No visits with results within 1 Month(s) from this visit.    Latest known visit with results is:   Hospital Outpatient Visit on 05/30/2019   Component Date Value Ref Range Status    Hemoglobin A1c 05/30/2019 6.7* 4.2 - 5.6 % Final    Est. average glucose 05/30/2019 146  mg/dL Final    LIPID PROFILE 05/30/2019        Final    Cholesterol, total 05/30/2019 148  <200 MG/DL Final    Triglyceride 05/30/2019 104  <150 MG/DL Final    HDL Cholesterol 05/30/2019 46  40 - 60 MG/DL Final    LDL, calculated 05/30/2019 81.2  0 - 100 MG/DL Final    VLDL, calculated 05/30/2019 20.8  MG/DL Final    CHOL/HDL Ratio 05/30/2019 3.2  0 - 5.0   Final    WBC 05/30/2019 4.5* 4.6 - 13.2 K/uL Final    RBC 05/30/2019 5.19  4.70 - 5.50 M/uL Final    HGB 05/30/2019 13.7  13.0 - 16.0 g/dL Final    HCT 05/30/2019 44.6  36.0 - 48.0 % Final    MCV 05/30/2019 85.9  74.0 - 97.0 FL Final    MCH 05/30/2019 26.4  24.0 - 34.0 PG Final    MCHC 05/30/2019 30.7* 31.0 - 37.0 g/dL Final    RDW 05/30/2019 13.3  11.6 - 14.5 % Final    PLATELET 19/16/3868 482  135 - 420 K/uL Final    MPV 05/30/2019 10.1  9.2 - 11.8 FL Final    NEUTROPHILS 05/30/2019 47  40 - 73 % Final    LYMPHOCYTES 05/30/2019 45  21 - 52 % Final    MONOCYTES 05/30/2019 7  3 - 10 % Final    EOSINOPHILS 05/30/2019 1  0 - 5 % Final    BASOPHILS 05/30/2019 0  0 - 2 % Final    ABS. NEUTROPHILS 05/30/2019 2.1  1.8 - 8.0 K/UL Final    ABS. LYMPHOCYTES 05/30/2019 2.1  0.9 - 3.6 K/UL Final    ABS. MONOCYTES 05/30/2019 0.3  0.05 - 1.2 K/UL Final    ABS. EOSINOPHILS 05/30/2019 0.1  0.0 - 0.4 K/UL Final    ABS. BASOPHILS 05/30/2019 0.0  0.0 - 0.1 K/UL Final    DF 05/30/2019 AUTOMATED    Final    Sodium 05/30/2019 142  136 - 145 mmol/L Final    Potassium 05/30/2019 4.7  3.5 - 5.5 mmol/L Final    Chloride 05/30/2019 107  100 - 108 mmol/L Final    CO2 05/30/2019 28  21 - 32 mmol/L Final    Anion gap 05/30/2019 7  3.0 - 18 mmol/L Final    Glucose 05/30/2019 129* 74 - 99 mg/dL Final    BUN 05/30/2019 16  7.0 - 18 MG/DL Final    Creatinine 05/30/2019 1.03  0.6 - 1.3 MG/DL Final    BUN/Creatinine ratio 05/30/2019 16  12 - 20   Final    GFR est AA 05/30/2019 >60  >60 ml/min/1.73m2 Final    GFR est non-AA 05/30/2019 >60  >60 ml/min/1.73m2 Final    Calcium 05/30/2019 8.9  8.5 - 10.1 MG/DL Final    Prostate Specific Ag 05/30/2019 0.9  0.0 - 4.0 ng/mL Final     Health Maintenance:  Screening:    Colorectal: colonoscopy (3/2017) tubular adenoma. Dr. Tony Calderon. Due 2022.    Depression: none   DM (HbA1c/FPG): HbA1c 6.3 (11/2019)   Hepatitis C: negative (2/2016)   Falls: none   DEXA: N/A   PSA/ANU: PSA 1.2/ ANU (11/2019)   Glaucoma: regular eye exams with [facility]. Marya Feliciano of [facility] (last 1/2019)   Smoking: distant past   Vitamin D: 40.8 (11/2019)   Medicare Wellness: 6/4/2019    Impression:  Patient Active Problem List   Diagnosis Code    Hyperlipidemia E78.5    Family history of prostate cancer Z80.42    Colon polyps K63.5    Gastric ulcer K25.9    H/O deep venous thrombosis Z86.718    Diabetes mellitus ([facility] E11.9    Essential hypertension I10    GERD (gastroesophageal reflux disease) K21.9    Vitamin D insufficiency E55.9       Plan:  1. Diabetes mellitus. HbA1c had remained stable at 6.7 on metformin  mg with breakfast and dinner. Did not have fasting labs drawn prior to visit and will reassess today. He has not had any current evidence of microvascular complications. Urine microalbumin/ creatinine ratio without evidence of microalbuminuria. Will reassess today. Foot exam normal (11/2019). On statin, but not on Ace-I or ARB given now normotensive. Continue follow-up for annual eye exams. Emphasized importance of continued lifestyle modifications, including diet, exercise, and weight loss. Continue to follow. 2. Rectal bleeding. Two episodes over two month span. Description most consistent with hemorrhoidal source. Recent colonoscopy in 3/2017 with only a single tubular adenoma removed. CBC and iron studies normal. Underwent reevaluation by GI, and did not feel further testing warranted unless recurs as felt most likely anorectal source. Patient reports no recurrence. Follow. 3. Hypertension. Borderline control today and would have low threshold to initiating Ace-I or ARB given underlying diabetes. Advised to monitor his blood pressure at home and to drop off readings in two weeks for review. Renal function has been normal with creatinine 1.03/ eGFR >60. Will reassess today.  Abdominal ultrasound negative for aneurysm (10/2017). 4. Hyperlipidemia. Previously on low intensity dose simvastatin with . Changed to moderate intensity dose atorvastatin 20 mg daily with improvement to LDL to 81 and HDL 46, indicative of reasonable control. Will reassess today. Emphasized importance of lifestyle modifications, including diet, exercise, and weight loss. 5. Rising PSA. PSA increased to 2.5 in 8/2018, but returned to normal at 0.9 in 5/2019. Will reassess today. Strong family history of prostate cancer. ANU negative today. Follow closely. 6. H/O provoked DVT. No evidence of recurrence since 2012. Continue to follow. 7. GERD. Symptomatic control with over the counter antacids. Follow. 8. Overweight. Patient's weight is approximately 10 pounds above his baseline. Emphasized importance of lifestyle modifications, including diet, exercise, and weight loss. Will readdress at next visit. 9. Health maintenance. Patient unwilling to receive influenza vaccine. Completed 2/2 doses of Shingrix vaccine. Already received pneumovax and Prevnar 13. Other immunizations up to date. Colonoscopy due 2022. Prostate cancer screening up to date as above. Continue annual eye exams. Vitamin D level normal. Continue maintenance dose supplement. Medicare wellness visit up to date. Patient understands recommendations and agrees with plan. Follow-up in 6 months.       [facility] Outpatient Visit on 12/05/2019   Component Date Value Ref Range Status    WBC 12/05/2019 3.9* 4.6 - 13.2 K/uL Final    RBC 12/05/2019 4.88  4.70 - 5.50 M/uL Final    HGB 12/05/2019 13.1  13.0 - 16.0 g/dL Final    HCT 12/05/2019 42.4  36.0 - 48.0 % Final    MCV 12/05/2019 86.9  74.0 - 97.0 FL Final    MCH 12/05/2019 26.8  24.0 - 34.0 PG Final    MCHC 12/05/2019 30.9* 31.0 - 37.0 g/dL Final    RDW 12/05/2019 13.2  11.6 - 14.5 % Final    PLATELET 02/87/8330 947  135 - 420 K/uL Final    MPV 12/05/2019 10.6  9.2 - 11.8 FL Final    NEUTROPHILS 12/05/2019 52  40 - 73 % Final    LYMPHOCYTES 12/05/2019 38  21 - 52 % Final    MONOCYTES 12/05/2019 8  3 - 10 % Final    EOSINOPHILS 12/05/2019 2  0 - 5 % Final    BASOPHILS 12/05/2019 0  0 - 2 % Final    ABS. NEUTROPHILS 12/05/2019 2.1  1.8 - 8.0 K/UL Final    ABS. LYMPHOCYTES 12/05/2019 1.5  0.9 - 3.6 K/UL Final    ABS. MONOCYTES 12/05/2019 0.3  0.05 - 1.2 K/UL Final    ABS. EOSINOPHILS 12/05/2019 0.1  0.0 - 0.4 K/UL Final    ABS. BASOPHILS 12/05/2019 0.0  0.0 - 0.1 K/UL Final    DF 12/05/2019 AUTOMATED    Final    Hemoglobin A1c 12/05/2019 6.3* 4.2 - 5.6 % Final    Est. average glucose 12/05/2019 134  mg/dL Final    LIPID PROFILE 12/05/2019        Final    Cholesterol, total 12/05/2019 173  <200 MG/DL Final    Triglyceride 12/05/2019 92  <150 MG/DL Final    HDL Cholesterol 12/05/2019 46  40 - 60 MG/DL Final    LDL, calculated 12/05/2019 108.6* 0 - 100 MG/DL Final    VLDL, calculated 12/05/2019 18.4  MG/DL Final    CHOL/HDL Ratio 12/05/2019 3.8  0 - 5.0   Final    Sodium 12/05/2019 141  136 - 145 mmol/L Final    Potassium 12/05/2019 3.9  3.5 - 5.5 mmol/L Final    Chloride 12/05/2019 109  100 - 111 mmol/L Final    CO2 12/05/2019 28  21 - 32 mmol/L Final    Anion gap 12/05/2019 4  3.0 - 18 mmol/L Final    Glucose 12/05/2019 124* 74 - 99 mg/dL Final    BUN 12/05/2019 17  7.0 - 18 MG/DL Final    Creatinine 12/05/2019 1.03  0.6 - 1.3 MG/DL Final    BUN/Creatinine ratio 12/05/2019 17  12 - 20   Final    GFR est AA 12/05/2019 >60  >60 ml/min/1.73m2 Final    GFR est non-AA 12/05/2019 >60  >60 ml/min/1.73m2 Final    Calcium 12/05/2019 9.0  8.5 - 10.1 MG/DL Final    Bilirubin, total 12/05/2019 0.5  0.2 - 1.0 MG/DL Final    ALT (SGPT) 12/05/2019 28  16 - 61 U/L Final    AST (SGOT) 12/05/2019 15  10 - 38 U/L Final    Alk.  phosphatase 12/05/2019 78  45 - 117 U/L Final    Protein, total 12/05/2019 6.9  6.4 - 8.2 g/dL Final    Albumin 12/05/2019 3.9  3.4 - 5.0 g/dL Final    Globulin 12/05/2019 3.0  2.0 - 4.0 g/dL Final    A-G Ratio 12/05/2019 1.3  0.8 - 1.7   Final    Prostate Specific Ag 12/05/2019 1.2  0.0 - 4.0 ng/mL Final    TSH 12/05/2019 0.73  0.36 - 3.74 uIU/mL Final    Color 12/05/2019 YELLOW    Final    Appearance 12/05/2019 CLEAR    Final    Specific gravity 12/05/2019 1.026  1.005 - 1.030   Final    pH (UA) 12/05/2019 5.5  5.0 - 8.0   Final    Protein 12/05/2019 NEGATIVE   NEG mg/dL Final    Glucose 12/05/2019 NEGATIVE   NEG mg/dL Final    Ketone 12/05/2019 NEGATIVE   NEG mg/dL Final    Bilirubin 12/05/2019 NEGATIVE   NEG   Final    Blood 12/05/2019 NEGATIVE   NEG   Final    Urobilinogen 12/05/2019 0.2  0.2 - 1.0 EU/dL Final    Nitrites 12/05/2019 NEGATIVE   NEG   Final    Leukocyte Esterase 12/05/2019 NEGATIVE   NEG   Final    WBC 12/05/2019 0 to 2  0 - 4 /hpf Final    RBC 12/05/2019 0  0 - 5 /hpf Final    Epithelial cells 12/05/2019 FEW  0 - 5 /lpf Final    Bacteria 12/05/2019 NEGATIVE   NEG /hpf Final    Vitamin D 25-Hydroxy 12/05/2019 40.8  30 - 100 ng/mL Final    Microalbumin,urine random 12/05/2019 1.84  0 - 3.0 MG/DL Final    Creatinine, urine 12/05/2019 169.00* 30 - 125 mg/dL Final    Microalbumin/Creat ratio (mg/g cre* 12/05/2019 11  0 - 30 mg/g Final     Reviewed labs from visit.    CBC normal without evidence of anemia. Renal and liver function are normal. Urine also shows no microalbumin, which is normal.  Thyroid function is normal and Vitamin D level is normal.   Urinalysis without evidence of protein or blood. Lipid panel with total chol 173, HDL 46, and , which is not well controlled on current dose of atorvastatin. Will need to confirm that he is compliant with his daily dose. If he is taking it as prescribed, will recommend increasing dose to 40 mg daily. If not, will advise him to take daily since at increased risk of heart disease with diabetes. Goal LDL <70.   HbA1c is 6.3 which is reasonably well controlled on current dose of metformin. Would continue to recommend avoiding concentrated sweets, and limiting carbohydrates. Would also recommend increasing exercise and weight loss. PSA 1.2 which is slightly increased from last level of 0.9, but remains in normal range and was at similar level in 2017, so not of concern.

## 2019-12-09 NOTE — TELEPHONE ENCOUNTER
Dr. José Miguel Rashid, patient states he has been taking his Atorvastatin 20 mg daily but due to your message he needs the updated dosage to 40 mg daily.

## 2020-05-28 ENCOUNTER — HOSPITAL ENCOUNTER (OUTPATIENT)
Dept: LAB | Age: 70
Discharge: HOME | End: 2020-05-28
Payer: MEDICARE

## 2020-05-28 ENCOUNTER — APPOINTMENT (OUTPATIENT)
Dept: INTERNAL MEDICINE CLINIC | Age: 70
End: 2020-05-28

## 2020-05-28 DIAGNOSIS — E11.9 TYPE 2 DIABETES MELLITUS WITHOUT COMPLICATION, WITHOUT LONG-TERM CURRENT USE OF INSULIN (HCC): ICD-10-CM

## 2020-05-28 DIAGNOSIS — E55.9 VITAMIN D INSUFFICIENCY: ICD-10-CM

## 2020-05-28 DIAGNOSIS — I10 ESSENTIAL HYPERTENSION: ICD-10-CM

## 2020-05-28 DIAGNOSIS — D12.4 ADENOMATOUS POLYP OF DESCENDING COLON: ICD-10-CM

## 2020-05-28 DIAGNOSIS — Z80.42 FAMILY HISTORY OF PROSTATE CANCER: ICD-10-CM

## 2020-05-28 DIAGNOSIS — E78.5 HYPERLIPIDEMIA, UNSPECIFIED HYPERLIPIDEMIA TYPE: ICD-10-CM

## 2020-05-28 DIAGNOSIS — Z86.718 H/O DEEP VENOUS THROMBOSIS: ICD-10-CM

## 2020-05-28 DIAGNOSIS — K21.9 GASTROESOPHAGEAL REFLUX DISEASE, ESOPHAGITIS PRESENCE NOT SPECIFIED: ICD-10-CM

## 2020-05-28 LAB
ALBUMIN SERPL-MCNC: 3.8 G/DL (ref 3.4–5)
ALBUMIN/GLOB SERPL: 1.3 {RATIO} (ref 0.8–1.7)
ALP SERPL-CCNC: 84 U/L (ref 45–117)
ALT SERPL-CCNC: 22 U/L (ref 16–61)
ANION GAP SERPL CALC-SCNC: 7 MMOL/L (ref 3–18)
AST SERPL-CCNC: 15 U/L (ref 10–38)
BILIRUB SERPL-MCNC: 0.5 MG/DL (ref 0.2–1)
BUN SERPL-MCNC: 18 MG/DL (ref 7–18)
BUN/CREAT SERPL: 18 (ref 12–20)
CALCIUM SERPL-MCNC: 9 MG/DL (ref 8.5–10.1)
CHLORIDE SERPL-SCNC: 111 MMOL/L (ref 100–111)
CHOLEST SERPL-MCNC: 139 MG/DL
CO2 SERPL-SCNC: 26 MMOL/L (ref 21–32)
CREAT SERPL-MCNC: 0.98 MG/DL (ref 0.6–1.3)
CREAT UR-MCNC: 264 MG/DL (ref 30–125)
EST. AVERAGE GLUCOSE BLD GHB EST-MCNC: 148 MG/DL
GLOBULIN SER CALC-MCNC: 2.9 G/DL (ref 2–4)
GLUCOSE SERPL-MCNC: 115 MG/DL (ref 74–99)
HBA1C MFR BLD: 6.8 % (ref 4.2–5.6)
HDLC SERPL-MCNC: 46 MG/DL (ref 40–60)
HDLC SERPL: 3 {RATIO} (ref 0–5)
LDLC SERPL CALC-MCNC: 81.6 MG/DL (ref 0–100)
LIPID PROFILE,FLP: NORMAL
MAGNESIUM SERPL-MCNC: 2 MG/DL (ref 1.6–2.6)
MICROALBUMIN UR-MCNC: 1.71 MG/DL (ref 0–3)
MICROALBUMIN/CREAT UR-RTO: 6 MG/G (ref 0–30)
POTASSIUM SERPL-SCNC: 4.3 MMOL/L (ref 3.5–5.5)
PROT SERPL-MCNC: 6.7 G/DL (ref 6.4–8.2)
SODIUM SERPL-SCNC: 144 MMOL/L (ref 136–145)
TRIGL SERPL-MCNC: 57 MG/DL (ref ?–150)
VLDLC SERPL CALC-MCNC: 11.4 MG/DL

## 2020-05-28 PROCEDURE — 80053 COMPREHEN METABOLIC PANEL: CPT

## 2020-05-28 PROCEDURE — 83036 HEMOGLOBIN GLYCOSYLATED A1C: CPT

## 2020-05-28 PROCEDURE — 82043 UR ALBUMIN QUANTITATIVE: CPT

## 2020-05-28 PROCEDURE — 80061 LIPID PANEL: CPT

## 2020-05-28 PROCEDURE — 83735 ASSAY OF MAGNESIUM: CPT

## 2020-05-28 PROCEDURE — 36415 COLL VENOUS BLD VENIPUNCTURE: CPT

## 2020-06-04 ENCOUNTER — VIRTUAL VISIT (OUTPATIENT)
Dept: INTERNAL MEDICINE CLINIC | Age: 70
End: 2020-06-04

## 2020-06-04 ENCOUNTER — TELEPHONE (OUTPATIENT)
Dept: INTERNAL MEDICINE CLINIC | Age: 70
End: 2020-06-04

## 2020-06-04 DIAGNOSIS — E78.5 HYPERLIPIDEMIA, UNSPECIFIED HYPERLIPIDEMIA TYPE: ICD-10-CM

## 2020-06-04 DIAGNOSIS — Z12.5 ENCOUNTER FOR SCREENING FOR MALIGNANT NEOPLASM OF PROSTATE: ICD-10-CM

## 2020-06-04 DIAGNOSIS — Z71.89 ACP (ADVANCE CARE PLANNING): ICD-10-CM

## 2020-06-04 DIAGNOSIS — E11.9 TYPE 2 DIABETES MELLITUS WITHOUT COMPLICATION, WITHOUT LONG-TERM CURRENT USE OF INSULIN (HCC): Primary | ICD-10-CM

## 2020-06-04 DIAGNOSIS — M25.561 ACUTE PAIN OF RIGHT KNEE: ICD-10-CM

## 2020-06-04 DIAGNOSIS — E55.9 VITAMIN D INSUFFICIENCY: ICD-10-CM

## 2020-06-04 DIAGNOSIS — I10 ESSENTIAL HYPERTENSION: ICD-10-CM

## 2020-06-04 DIAGNOSIS — K21.9 GASTROESOPHAGEAL REFLUX DISEASE, ESOPHAGITIS PRESENCE NOT SPECIFIED: ICD-10-CM

## 2020-06-04 DIAGNOSIS — Z86.718 H/O DEEP VENOUS THROMBOSIS: ICD-10-CM

## 2020-06-04 DIAGNOSIS — Z00.00 MEDICARE ANNUAL WELLNESS VISIT, SUBSEQUENT: ICD-10-CM

## 2020-06-04 DIAGNOSIS — Z80.42 FAMILY HISTORY OF PROSTATE CANCER: ICD-10-CM

## 2020-06-04 NOTE — PROGRESS NOTES
Lamont Ding is a 81 y.o. male who was seen by synchronous (real-time) audio-video technology on 6/4/2020. Consent: Lamont Ding, who was seen by synchronous (real-time) audio-video technology, and/or his healthcare decision maker, is aware that this patient-initiated, Telehealth encounter on 6/4/2020 is a billable service, with coverage as determined by his insurance carrier. He is aware that he may receive a bill and has provided verbal consent to proceed: Yes . HPI:   Lamont Ding is a 81y.o. year old male who has a history of hypertension, hyperlipidemia, diabetes mellitus, s/p provoked DVT, and GERD. He reports that he is doing reasonably well. He reports that he has been following social distancing guidelines, and will wear a mask when going outside. He states that he is continuing to walk daily for exercise, but has noted onset of right knee pain and swelling after walking for the last few weeks. He states that it was initially very painful, but reports that it has gradually improved. He denies known injury, fever, or chills. He is otherwise without new complaints. He has a history of hypertension, not requiring treatment with medication. He states that he has not been monitoring his blood pressure regularly at home, but reports that it is ranging 120-130/ 70-80 when he does check it. He states that he has resumed walking for exercise and is trying to perform 10,000 steps per day. He is also active doing yard work. He denies any chest pain, shortness of breath at rest or with exertion, palpitations, lightheadedness, or edema. He also has a history of hyperlipidemia, treated with moderate intensity dose atorvastatin. He has a history of diabetes mellitus, recently diagnosed in 2/2016 with HbA1c of 6.5. Review of chart showed that fasting blood glucose has been elevated since 2010 ranging from 107-128.  He was started on metformin in 11/2017, but had difficulty with the ER formulation due to the size of the pill. He was changed to metformin IR in 2/2018 without further difficulties. He denies any polyuria, polydipsia, nocturia, or blurry vision, and has no history of retinopathy, neuropathy, or nephropathy. He has regular eye exams with Dr. Kinza De Santiago of [facility] on [address] [place] [address]. He has a history of a provoked DVT, occurring in 8/2012 following an airplane ride home from [place]. Venous duplex scan (9/1/2012) at [place] [address] showed an acute nonocclusive DVT in the left popliteal vein, and an acute thrombus in the left gastrocnemius and soleus veins. He was treated with Lovenox and transitioned to coumadin and completed three months of therapy. 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 He reports occasional mild swelling in his left foot, but denies any calf pain or tenderness, shortness of breath, or pleuritic chest pain. He has had multiple long plane rides since that time without recurrence. He had an excisional biopsy of a mass in his right posterior upper arm on 6/30/2017 by Dr. Rebecca Skinner. Pathology showed an epidermal inclusion cyst.     He has a history of GERD, but uses over the counter antacids occasionally. He also has a reported history of a gastric ulcer, but records are unavailable. He had a screening colonoscopy in 11/2011 by Dr. Gavino Cheng showing mild diverticulosis, a 2 mm sessile polyp in the mid sigmoid colon (fulgurized), and a 5 mm polyp in the proximal sigmoid colon (pathology unavailable). He had another screening colonoscopy in 3/2017 by Dr. Gavino Cheng showing a 3 mm sessile descending colon polyp (pathology: tubular adenoma). Follow-up recommended for five years. In 5/2018, he reported two episodes of rectal bleeding occurring with bowel movements. He denied any abdominal pain, melena, change in caliber or character of his stool, fatigue, pallor, pica, or restless leg symptoms.  His weight had decreased six pounds, but he had reported trying to lose weight to help with his diabetes mellitus. He was referred to GI and evaluated by [facility], who recommended observation with plans to proceed with flexible sigmoidoscopy if recurs. He reports no recurrence. Past Medical History:   Diagnosis Date    Colon polyps     Diabetes mellitus ([address]     FH: prostate cancer     GERD (gastroesophageal reflux disease)     History of DVT (deep vein thrombosis) 2012    Provoked following airplane ride home from [place]. Venous duplex scan showed acute nonocclusive DVT in left popliteal vein; acute thrombosis left gastocnemius and soleus vein.  Hypercholesterolemia     Hypertension     Thromboembolus ([address]     leg  years ago     Past Surgical History:   Procedure Laterality Date    HX GI      colonoscopy x 3    MI EXCISION TUMOR SOFT TISSUE SHOULDER SUBQ 3+CM Right 2017    Dr. Nakul Loco     Current Outpatient Medications   Medication Sig    atorvastatin (LIPITOR) 40 mg tablet Take 1 Tab by mouth daily.  metFORMIN (GLUCOPHAGE) 500 mg tablet TAKE 1 TABLET TWICE A DAY  WITH MEALS    pediatric multivitamin no.76 (FLINTSTONES COMPLETE PO) Take  by mouth.  cyanocobalamin (VITAMIN B-12) 1,000 mcg tablet Take 500 mcg by mouth daily.  cholecalciferol (VITAMIN D3) 1,000 unit cap Take 1,000 Units by mouth daily. No current facility-administered medications for this visit. Allergies and Intolerances:   No Known Allergies     Family History: His mother is alive and has DM. His father  of a stroke at age 80. He also had prostate cancer. His brother has prostate cancer metastatic to bone. Family History   Problem Relation Age of Onset    Diabetes Mother     Cancer Father     Stroke Father     Hypertension Father     Diabetes Brother      Social History:   He  reports that he quit smoking about 21 years ago. He has a 7.50 pack-year smoking history. He has never used smokeless tobacco. He smoked 0.5 ppd for 20 years, stopping in .  He is  and has one living son, and one son who is . 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 Social History     Substance and Sexual Activity   Alcohol Use No     Immunization History:  Immunization History   Administered Date(s) Administered    Pneumococcal Conjugate (PCV-13) 2019    Pneumococcal Polysaccharide (PPSV-23) 2017    TD Vaccine 2007    Tdap 2017    Zoster Vaccine, Live 2013       Review of Systems:   As above included in HPI. Otherwise 11 point review of systems negative including constitutional, skin, HENT, eyes, respiratory, cardiovascular, gastrointestinal, genitourinary, musculoskeletal, endocrine, hematologic, allergy, and neurologic. Physical:   Objective:   Vital Signs: (As obtained by patient/caregiver at home)  There were no vitals taken for this visit.      Constitutional: [x] Appears well-developed and well-nourished [x] No apparent distress      [] Abnormal -     Mental status: [x] Alert and awake  [x] Oriented to person/place/time [x] Able to follow commands    [] Abnormal -     Eyes:   EOM    [x]  Normal    [] Abnormal -   Sclera  [x]  Normal    [] Abnormal -          Discharge [x]  None visible   [] Abnormal -     HENT: [x] Normocephalic, atraumatic  [] Abnormal -   [x] Mouth/Throat: Mucous membranes are moist    External Ears [x] Normal  [] Abnormal -    Neck: [x] No visualized mass [] Abnormal -     Pulmonary/Chest: [x] Respiratory effort normal   [x] No visualized signs of difficulty breathing or respiratory distress        [] Abnormal -      Musculoskeletal:   [x] Normal gait with no signs of ataxia         [x] Normal range of motion of neck        [] Abnormal -     Neurological:        [x] No Facial Asymmetry (Cranial nerve 7 motor function) (limited exam due to video visit)          [x] No gaze palsy        [] Abnormal -          Skin:        [x] No significant exanthematous lesions or discoloration noted on facial skin         [] Abnormal -            Psychiatric:       [x] Normal Affect [] Abnormal -        [x] No Hallucinations    Review of Data:  Labs:  Hospital Outpatient Visit on 05/28/2020   Component Date Value Ref Range Status    Hemoglobin A1c 05/28/2020 6.8* 4.2 - 5.6 % Final    Est. average glucose 05/28/2020 148  mg/dL Final    LIPID PROFILE 05/28/2020        Final    Cholesterol, total 05/28/2020 139  <200 MG/DL Final    Triglyceride 05/28/2020 57  <150 MG/DL Final    HDL Cholesterol 05/28/2020 46  40 - 60 MG/DL Final    LDL, calculated 05/28/2020 81.6  0 - 100 MG/DL Final    VLDL, calculated 05/28/2020 11.4  MG/DL Final    CHOL/HDL Ratio 05/28/2020 3.0  0 - 5.0   Final    Magnesium 05/28/2020 2.0  1.6 - 2.6 mg/dL Final    Sodium 05/28/2020 144  136 - 145 mmol/L Final    Potassium 05/28/2020 4.3  3.5 - 5.5 mmol/L Final    Chloride 05/28/2020 111  100 - 111 mmol/L Final    CO2 05/28/2020 26  21 - 32 mmol/L Final    Anion gap 05/28/2020 7  3.0 - 18 mmol/L Final    Glucose 05/28/2020 115* 74 - 99 mg/dL Final    BUN 05/28/2020 18  7.0 - 18 MG/DL Final    Creatinine 05/28/2020 0.98  0.6 - 1.3 MG/DL Final    BUN/Creatinine ratio 05/28/2020 18  12 - 20   Final    GFR est AA 05/28/2020 >60  >60 ml/min/1.73m2 Final    GFR est non-AA 05/28/2020 >60  >60 ml/min/1.73m2 Final    Calcium 05/28/2020 9.0  8.5 - 10.1 MG/DL Final    Bilirubin, total 05/28/2020 0.5  0.2 - 1.0 MG/DL Final    ALT (SGPT) 05/28/2020 22  16 - 61 U/L Final    AST (SGOT) 05/28/2020 15  10 - 38 U/L Final    Alk.  phosphatase 05/28/2020 84  45 - 117 U/L Final    Protein, total 05/28/2020 6.7  6.4 - 8.2 g/dL Final    Albumin 05/28/2020 3.8  3.4 - 5.0 g/dL Final    Globulin 05/28/2020 2.9  2.0 - 4.0 g/dL Final    A-G Ratio 05/28/2020 1.3  0.8 - 1.7   Final    Microalbumin,urine random 05/28/2020 1.71  0 - 3.0 MG/DL Final    Creatinine, urine 05/28/2020 264.00* 30 - 125 mg/dL Final    Microalbumin/Creat ratio (mg/g cre* 05/28/2020 6  0 - 30 mg/g Final     Health Maintenance:  Screening:    Colorectal: colonoscopy (3/2017) tubular adenoma. Dr. Lor Rousseau. Due 2022. Depression: none   DM (HbA1c/FPG): HbA1c 6.8 (5/2020)   Hepatitis C: negative (2/2016)   Falls: none   DEXA: N/A   PSA/ANU: PSA 1.2/ ANU (12/2019)   Glaucoma: regular eye exams with Dr. Charles Pendleton of [facility] (last 1/2019)   Smoking: distant past   Vitamin D: 40.8 (11/2019)   Medicare Wellness: today    Impression:  Patient Active Problem List   Diagnosis Code    Hyperlipidemia E78.5    Family history of prostate cancer Z80.42    Colon polyps K63.5    Gastric ulcer K25.9    H/O deep venous thrombosis Z86.718    Diabetes mellitus ([facility] E11.9    Essential hypertension I10    GERD (gastroesophageal reflux disease) K21.9    Vitamin D insufficiency E55.9    Acute pain of right knee M25.561       Plan:  1. Diabetes mellitus. HbA1c increased today to 6.8 on metformin  mg with breakfast and dinner. He does not have evidence of microvascular complications. Urine microalbumin/ creatinine ratio without evidence of microalbuminuria. Foot exam normal (11/2019). On statin, but not on Ace-I or ARB given now normotensive. Continue follow-up for annual eye exams. Emphasized importance of continued lifestyle modifications, including diet, exercise, and weight loss. Continue to follow. 2. Hypertension. Has not been monitoring his blood pressure at home. Advised to do so and call if readings >130/80. Will have low threshold for initiating Ace-I or ARB given underlying diabetes. Renal function has been normal with creatinine 0.98/ eGFR >60. Abdominal ultrasound negative for aneurysm (10/2017). 3. Hyperlipidemia. On moderate intensity dose atorvastatin 20 mg daily with LDL to 81 and HDL 46, indicative of good control. Emphasized importance of lifestyle modifications, including diet, exercise, and weight loss. 4. Rising PSA.  PSA increased to 2.5 in 8/2018, but returned to normal at 0.9 in 5/2019. Stable last visit at 1.2. Strong family history of prostate cancer. ANU negative last visit. Will reassess next visit. 5. H/O provoked DVT. No evidence of recurrence since 2012. Continue to follow. 6. Rectal bleeding. Two episodes over two month span. Description most consistent with hemorrhoidal source. Recent colonoscopy in 3/2017 with only a single tubular adenoma removed. CBC and iron studies normal. Underwent reevaluation by GI, and did not feel further testing warranted unless recurs as felt most likely anorectal source. Patient reports no recurrence. Follow  7. GERD. Symptomatic control with over the counter antacids. Follow. 8. Overweight. Patient's weight is approximately 10 pounds above his baseline. Emphasized importance of lifestyle modifications, including diet, exercise, and weight loss. Will readdress at next visit. 9. Right knee pain. Patient attempting to walk for exercise, and admits to onset of right knee pain with swelling. However, states that now improving and not wishing referral to orthopedics at this time. Will call if worsens. 10. Health maintenance. Patient unwilling to receive influenza vaccine. Completed 2/2 doses of Shingrix vaccine. Already received pneumovax and Prevnar 13. Other immunizations up to date. Colonoscopy due 2022. Prostate cancer screening up to date as above. Continue annual eye exams and advised to schedule. Vitamin D level normal. Continue maintenance dose supplement. In addition, an annual Medicare wellness visit was done today. Patient understands recommendations and agrees with plan. Follow-up in 6 months. We discussed the expected course, resolution and complications of the diagnosis(es) in detail. Medication risks, benefits, costs, interactions, and alternatives were discussed as indicated. I advised him to contact the office if his condition worsens, changes or fails to improve as anticipated.  He expressed understanding with the diagnosis(es) and plan. Keren Sanchez is a 81 y.o. male who was evaluated by a video visit encounter for concerns as above. Patient identification was verified prior to start of the visit. A caregiver was present when appropriate. Due to this being a TeleHealth encounter (During WKDUI-11 public health emergency), evaluation of the following organ systems was limited: Vitals/Constitutional/EENT/Resp/CV/GI//MS/Neuro/Skin/Heme-Lymph-Imm. Pursuant to the emergency declaration under the [facility], 1135 waiver authority and the [employer] and [employer] Act, this Virtual  Visit was conducted, with patient's (and/or legal guardian's) consent, to reduce the patient's risk of exposure to COVID-19 and provide necessary medical care. Services were provided through a video synchronous discussion virtually to substitute for in-person clinic visit. Patient was at home and provider was located in the office.         Laney Castellanos MD

## 2020-06-04 NOTE — PATIENT INSTRUCTIONS
Medicare Wellness Visit, Male The best way to improve and maintain good health is to have a healthy lifestyle by eating a well-balanced diet, exercising regularly, limiting alcohol and stopping smoking. Regular visits with your physician or non-physician health care provider also support your good health. Preventive screening tests can find health problems before they become diseases or illnesses. Preventive services such as immunizations prevent serious infections. All people over age 72 should have a Pneumovax and a Prevnar-13 shot to prevent potentially life threatening infections with the pneumococcus bacteria, a common cause of pneumonia. These are once in a lifetime unless you and your provider decide differently. All people over 65 should have a yearly influenza vaccine or \"flu\" shot. This does not prevent infection with cold viruses but has been proven to prevent hospitalization and death from influenza. Although Medicare part B \"regular Medicare\" currently only covers tetanus vaccination in the context of an injury, a tetanus vaccine (Tdap or Td) is recommended every 10 years. A shingles vaccine is recommended once in a lifetime after age 61. The Shingles vaccine is also not covered by Medicare part B. Note, however, that both the Shingles vaccine and Tdap/Td are generally covered by secondary carriers. Please check your coverage and out of pocket expenses. Consider contacting your local health department because it may stock these vaccines for a reasonable charge. We currently have documentation of the following immunization history for you: 
Immunization History Administered Date(s) Administered  Pneumococcal Conjugate (PCV-13) 06/04/2019  Pneumococcal Polysaccharide (PPSV-23) 07/12/2017  TD Vaccine 09/07/2007  Tdap 01/04/2017  Zoster Vaccine, Live 07/30/2013 Screening for infection with Hepatitis C is recommended for anyone born between [birth year] through [address]. The table at the bottom of this document indicates the status of this and other screening services. Screening for diabetes mellitus with a blood sugar test (glucose) should be done at least every 3 years until age 79. You and your health care provider may decide whether to continue screening after age 79. The most recent blood glucose we have on file for you is:  
Lab Results Component Value Date/Time Glucose 115 (H) 05/28/2020 07:56 AM  
 Glucose (POC) 104 06/30/2017 09:10 AM  
 
 
Glaucoma is a disease of the eye due to increased ocular pressure that can lead to blindness. People with risk factors for glaucoma ( race, diabetes, family history) should be screened at least every 2 years by an eye professional. This may be covered annually if indicated as determined by you and your doctor. Cardiovascular screening tests that check for elevated lipids or cholesterol (fatty part of blood) which can lead to heart disease and strokes should be done every 4-6 years through age 79. You and your health care provider may decide whether to continue screening after age 79. The most recent lipid panel we have on file for you is:  
Lab Results Component Value Date/Time Cholesterol, total 139 05/28/2020 07:56 AM  
 HDL Cholesterol 46 05/28/2020 07:56 AM  
 LDL, calculated 81.6 05/28/2020 07:56 AM  
 VLDL, calculated 11.4 05/28/2020 07:56 AM  
 Triglyceride 57 05/28/2020 07:56 AM  
 CHOL/HDL Ratio 3.0 05/28/2020 07:56 AM  
 
 
Colorectal cancer screening that evaluates for blood or polyps in your colon for people with average risk should be done yearly as a stool test, every five years as a flexible sigmoidoscope or every 10 years as a colonoscopy up to age 76. You and your health care provider may decide whether to continue screening after age 76.  
 
Men up to age 76 may elect to screen for prostate cancer with a blood test called a PSA at certain intervals, depending on their personal and family history. This decision is between the patient and his provider. The most recent PSA values we have on file for you are: 
Lab Results Component Value Date/Time  
 Prostate Specific Ag 1.2 12/05/2019 09:05 AM  
 Prostate Specific Ag 0.9 05/30/2019 09:20 AM  
 Prostate Specific Ag 2.5 08/15/2018 09:30 AM  
 Prostate Specific Ag 0.939 08/04/2014 08:40 AM  
 
 
If you have been a smoker or had family history of abdominal aortic aneurysms, you and your provider may decide to schedule an ultrasound test of your aorta. Our records show this was done on: n/a People who have smoked the equivalent of 1 pack per day for 30 years or more may benefit from screening for lung cancer with a yearly low dose CT scan until they have been non smokers for 15 years or competing health conditions render this unlikely to be beneficial. Our records show: n/a Your Medicare Wellness Exam is recommended annually. Here is a list of your current Health Maintenance items with a due date: 
Health Maintenance Topic Date Due  Shingrix Vaccine Age 50> (1 of 2) 10/17/2020 (Originally 8/31/2002)  Influenza Age 5 to Adult  08/01/2020  Foot Exam Q1  12/05/2020  GLAUCOMA SCREENING Q2Y  01/18/2021  Eye Exam Retinal or Dilated  01/18/2021  A1C test (Diabetic or Prediabetic)  05/28/2021  MICROALBUMIN Q1  05/28/2021  Lipid Screen  05/28/2021  Medicare Yearly Exam  06/05/2021  Colonoscopy  03/20/2022  Pneumococcal 65+ years (2 of 2 - PPSV23) 07/12/2022  DTaP/Tdap/Td series (2 - Td) 01/04/2027  Hepatitis C Screening  Completed  AAA Screening 73-69 YO Male Smoking Patients  Completed

## 2020-06-04 NOTE — PROGRESS NOTES
This is the Subsequent Medicare Annual Wellness Exam, performed 12 months or more after the Initial AWV or the last Subsequent AWV    Consent: Ashwini Mcclain, who was seen by synchronous (real-time) audio-video technology, and/or his healthcare decision maker, is aware that this patient-initiated, Telehealth encounter on 6/4/2020 is a billable service. While AWVs are fully covered by Medicare, any services rendered on this date that are not included in an AWV are subject to additional billing, with coverage as determined by his insurance carrier. He is aware that he may receive a bill for any such additional services and has provided verbal consent to proceed: Yes. I have reviewed the patient's medical history in detail and updated the computerized patient record. History     Patient Active Problem List   Diagnosis Code    Hyperlipidemia E78.5    Family history of prostate cancer Z80.42    Colon polyps K63.5    Gastric ulcer K25.9    H/O deep venous thrombosis Z86.718    Diabetes mellitus ([address] E11.9    Essential hypertension I10    GERD (gastroesophageal reflux disease) K21.9    Vitamin D insufficiency E55.9    Acute pain of right knee M25.561     Past Medical History:   Diagnosis Date    Colon polyps     Diabetes mellitus ([address]     FH: prostate cancer     GERD (gastroesophageal reflux disease)     History of DVT (deep vein thrombosis) 09/2012    Provoked following airplane ride home from [place]. Venous duplex scan showed acute nonocclusive DVT in left popliteal vein; acute thrombosis left gastocnemius and soleus vein.     Hypercholesterolemia     Hypertension     Thromboembolus ([address]     leg  years ago      Past Surgical History:   Procedure Laterality Date    HX GI      colonoscopy x 3    MT EXCISION TUMOR SOFT TISSUE SHOULDER SUBQ 3+CM Right 06/30/2017    Dr. Venecia Hoffman     Current Outpatient Medications   Medication Sig Dispense Refill    atorvastatin (LIPITOR) 40 mg tablet Take 1 Tab by mouth daily. 90 Tab 2    metFORMIN (GLUCOPHAGE) 500 mg tablet TAKE 1 TABLET TWICE A DAY  WITH MEALS 180 Tab 2    pediatric multivitamin no.76 (FLINTSTONES COMPLETE PO) Take  by mouth.  cyanocobalamin (VITAMIN B-12) 1,000 mcg tablet Take 500 mcg by mouth daily.  cholecalciferol (VITAMIN D3) 1,000 unit cap Take 1,000 Units by mouth daily. No Known Allergies    Family History   Problem Relation Age of Onset    Diabetes Mother     Cancer Father     Stroke Father     Hypertension Father     Diabetes Brother      Social History     Tobacco Use    Smoking status: Former Smoker     Packs/day: 0.50     Years: 15.00     Pack years: 7.50     Last attempt to quit: 1999     Years since quittin.4    Smokeless tobacco: Never Used   Substance Use Topics    Alcohol use: No       Depression Risk Factor Screening:     3 most recent PHQ Screens 2020   Little interest or pleasure in doing things Not at all   Feeling down, depressed, irritable, or hopeless Not at all   Total Score PHQ 2 0       Alcohol Risk Factor Screening (MALE > 65): Do you average more 1 drink per night or more than 7 drinks a week: No    In the past three months have you have had more than 4 drinks containing alcohol on one occasion: No      Functional Ability and Level of Safety:   Hearing: Hearing is good. Activities of Daily Living: The home contains: no safety equipment. Patient does total self care    Ambulation: with no difficulty    Fall Risk:  Fall Risk Assessment, last 12 mths 2020   Able to walk? Yes   Fall in past 12 months?  No       Abuse Screen:  Patient is not abused    Cognitive Screening   Has your family/caregiver stated any concerns about your memory: no  Cognitive Screening: none performed    Patient Care Team   Patient Care Team:  Emma Patel MD as PCP - General (Internal Medicine)  Emma Patel MD as PCP - [facility] Empaneled Provider  Jie Araya NP as Nurse Practitioner (Nurse Practitioner)  Pilar Tena MD (Gastroenterology)    Assessment/Plan   Education and counseling provided:  Are appropriate based on today's review and evaluation  End-of-Life planning (with patient's consent)  Influenza Vaccine  Prostate cancer screening tests (PSA, covered annually)  Colorectal cancer screening tests  Cardiovascular screening blood test  Screening for glaucoma  Medical nutrition therapy for individuals with diabetes or renal disease  Shingrix vaccine    Diagnoses and all orders for this visit:    1. Type 2 diabetes mellitus without complication, without long-term current use of insulin (HCC)  -     CBC WITH AUTOMATED DIFF; Future  -     HEMOGLOBIN A1C WITH EAG; Future  -     LIPID PANEL; Future  -     MAGNESIUM; Future  -     METABOLIC PANEL, COMPREHENSIVE; Future  -     MICROALBUMIN, UR, RAND W/ MICROALB/CREAT RATIO; Future  -     PSA SCREENING (SCREENING); Future  -     TSH 3RD GENERATION; Future  -     URINALYSIS W/MICROSCOPIC; Future  -     VITAMIN D, 25 HYDROXY; Future    2. Hyperlipidemia, unspecified hyperlipidemia type  -     CBC WITH AUTOMATED DIFF; Future  -     HEMOGLOBIN A1C WITH EAG; Future  -     LIPID PANEL; Future  -     MAGNESIUM; Future  -     METABOLIC PANEL, COMPREHENSIVE; Future  -     MICROALBUMIN, UR, RAND W/ MICROALB/CREAT RATIO; Future  -     PSA SCREENING (SCREENING); Future  -     TSH 3RD GENERATION; Future  -     URINALYSIS W/MICROSCOPIC; Future  -     VITAMIN D, 25 HYDROXY; Future    3. Essential hypertension  -     CBC WITH AUTOMATED DIFF; Future  -     HEMOGLOBIN A1C WITH EAG; Future  -     LIPID PANEL; Future  -     MAGNESIUM; Future  -     METABOLIC PANEL, COMPREHENSIVE; Future  -     MICROALBUMIN, UR, RAND W/ MICROALB/CREAT RATIO; Future  -     PSA SCREENING (SCREENING); Future  -     TSH 3RD GENERATION; Future  -     URINALYSIS W/MICROSCOPIC; Future  -     VITAMIN D, 25 HYDROXY; Future    4.  Gastroesophageal reflux disease, esophagitis presence not specified  -     CBC WITH AUTOMATED DIFF; Future  -     HEMOGLOBIN A1C WITH EAG; Future  -     LIPID PANEL; Future  -     MAGNESIUM; Future  -     METABOLIC PANEL, COMPREHENSIVE; Future  -     MICROALBUMIN, UR, RAND W/ MICROALB/CREAT RATIO; Future  -     PSA SCREENING (SCREENING); Future  -     TSH 3RD GENERATION; Future  -     URINALYSIS W/MICROSCOPIC; Future  -     VITAMIN D, 25 HYDROXY; Future    5. H/O deep venous thrombosis  -     CBC WITH AUTOMATED DIFF; Future  -     HEMOGLOBIN A1C WITH EAG; Future  -     LIPID PANEL; Future  -     MAGNESIUM; Future  -     METABOLIC PANEL, COMPREHENSIVE; Future  -     MICROALBUMIN, UR, RAND W/ MICROALB/CREAT RATIO; Future  -     PSA SCREENING (SCREENING); Future  -     TSH 3RD GENERATION; Future  -     URINALYSIS W/MICROSCOPIC; Future  -     VITAMIN D, 25 HYDROXY; Future    6. Vitamin D insufficiency  -     CBC WITH AUTOMATED DIFF; Future  -     HEMOGLOBIN A1C WITH EAG; Future  -     LIPID PANEL; Future  -     MAGNESIUM; Future  -     METABOLIC PANEL, COMPREHENSIVE; Future  -     MICROALBUMIN, UR, RAND W/ MICROALB/CREAT RATIO; Future  -     PSA SCREENING (SCREENING); Future  -     TSH 3RD GENERATION; Future  -     URINALYSIS W/MICROSCOPIC; Future  -     VITAMIN D, 25 HYDROXY; Future    7. Family history of prostate cancer  -     CBC WITH AUTOMATED DIFF; Future  -     HEMOGLOBIN A1C WITH EAG; Future  -     LIPID PANEL; Future  -     MAGNESIUM; Future  -     METABOLIC PANEL, COMPREHENSIVE; Future  -     MICROALBUMIN, UR, RAND W/ MICROALB/CREAT RATIO; Future  -     PSA SCREENING (SCREENING); Future  -     TSH 3RD GENERATION; Future  -     URINALYSIS W/MICROSCOPIC; Future  -     VITAMIN D, 25 HYDROXY; Future    8. Acute pain of right knee  -     CBC WITH AUTOMATED DIFF; Future  -     HEMOGLOBIN A1C WITH EAG; Future  -     LIPID PANEL; Future  -     MAGNESIUM; Future  -     METABOLIC PANEL, COMPREHENSIVE;  Future  -     MICROALBUMIN, UR, RAND W/ MICROALB/CREAT RATIO; Future  - PSA SCREENING (SCREENING); Future  -     TSH 3RD GENERATION; Future  -     URINALYSIS W/MICROSCOPIC; Future  -     VITAMIN D, 25 HYDROXY; Future    9. Encounter for screening for malignant neoplasm of prostate   -     PSA SCREENING (SCREENING); Future    10. Medicare annual wellness visit, subsequent    11. ACP (advance care planning)        There are no preventive care reminders to display for this patient. Lamont Ding is a 81 y.o. male who was evaluated by an audio-video encounter for concerns as above. Patient identification was verified prior to start of the visit. A caregiver was present when appropriate. Due to this being a TeleHealth encounter (During GGSJZ-55 public health emergency), evaluation of the following organ systems was limited: Vitals/Constitutional/EENT/Resp/CV/GI//MS/Neuro/Skin/Heme-Lymph-Imm. Pursuant to the emergency declaration under the [facility] waiver authority and the [employer] Act, this Virtual Visit was conducted, with patient's (and/or legal guardian's) consent, to reduce the patient's risk of exposure to COVID-19 and provide necessary medical care. Services were provided through a synchronous discussion virtually to substitute for in-person clinic visit. I was in the office. The patient was at home.       Doug Lorenzo MD

## 2020-06-04 NOTE — ACP (ADVANCE CARE PLANNING)
Advance Care Planning       Advance Care Planning (ACP) Physician/NP/PA (Provider) Conversation        Date of ACP Conversation: 6/4/2020    Conversation Conducted with:   Patient with Decision Making Aashish Aaron Maker:    Current Designated Health Care Decision Maker:   (If there is a valid [place] named in the [address]" box in the ACP activity, but it is not visible above, be sure to open that field and then select the health care decision maker relationship (ie \"primary\") in the blank space to the right of the name.)    Note: Assess and validate information in current ACP documents, as indicated. If no Authorized Decision Maker has previously been identified, then patient chooses [place]:  \"Who would you like to name as your primary health care decision-maker? \"    Name: Karmen Meigs   Relationship: wife Phone number: [phone]  Aneita Severin this person be reached easily? \" YES    Note: If the relationship of these Decision-Makers to the patient does NOT follow your state's Next of Kin hierarchy, recommend that patient complete ACP document that meets state-specific requirements to allow them to act on the patient's behalf when appropriate. Care Preferences:    Hospitalization: \"If your health worsens and it becomes clear that your chance of recovery is unlikely, what would your preference be regarding hospitalization? \"  If the patient would want hospitalization, answer \"yes\". If the patient would prefer comfort-focused treatment without hospitalization, answer \"no\". yes      Ventilation: \"If you were in your present state of health and suddenly became very ill and were unable to breathe on your own, what would your preference be about the use of a ventilator (breathing machine) if it was available to you? \"    If patient would desire the use of a ventilator (breathing machine), answer \"yes\", if not answer \"no\":yes    \"If your health worsens and it becomes clear that your chance of recovery is unlikely, what would your preference be about the use of a ventilator (breathing machine) if it was available to you? \"   yes      Resuscitation:  \"CPR works best to restart the heart when there is a sudden event, like a heart attack, in someone who is otherwise healthy. Unfortunately, CPR does not typically restart the heart for people who have serious health conditions or who are very sick. \"    \"In the event your heart stopped as a result of an underlying serious health condition, would you want attempts to be made to restart your heart (answer \"yes\" for attempt to resuscitate) or would you prefer a natural death (answer \"no\" for do not attempt to resuscitate)? \"   yes    NOTE: If the patient has a valid advance directive AND provides care preference(s) that are inconsistent with that prior directive, advise the patient to consider either: creating a new advance directive that complies with state-specific requirements; or, if that is not possible, orally revoking that prior directive in accordance with state-specific requirements, which must be documented in the EHR.     Conversation Outcomes / Follow-Up Plan:   Recommended completion of Advance Directive      Length of Voluntary ACP Conversation in minutes:  16 minutes      Kwame Cisse MD

## 2020-06-11 RX ORDER — METFORMIN HYDROCHLORIDE 500 MG/1
TABLET ORAL
Qty: 180 TAB | Refills: 2 | Status: SHIPPED | OUTPATIENT
Start: 2020-06-11 | End: 2021-04-13

## 2020-07-09 ENCOUNTER — TELEPHONE (OUTPATIENT)
Dept: INTERNAL MEDICINE CLINIC | Age: 70
End: 2020-07-09

## 2020-07-09 DIAGNOSIS — M25.561 ACUTE PAIN OF RIGHT KNEE: Primary | ICD-10-CM

## 2020-07-09 NOTE — TELEPHONE ENCOUNTER
Called and spoke with patient. Reporting worsening pain and swelling in his right knee. Agreeable now to referral to ortho. Referral placed for Dr. Darby Sheffield. Using topical cream with reasonable control, Advised that may also use Tylenol. Should avoid NSAIDS given history of gastric ulcer.

## 2020-07-09 NOTE — TELEPHONE ENCOUNTER
Pt request to speak with Dr Michael Green re: right knee and foot worsening since virtual visit of 06/04/2020    Pharmacy is [facility]

## 2020-07-21 ENCOUNTER — VIRTUAL VISIT (OUTPATIENT)
Dept: INTERNAL MEDICINE CLINIC | Age: 70
End: 2020-07-21

## 2020-07-21 DIAGNOSIS — Z87.11 HISTORY OF GASTRIC ULCER: ICD-10-CM

## 2020-07-21 DIAGNOSIS — E78.5 HYPERLIPIDEMIA, UNSPECIFIED HYPERLIPIDEMIA TYPE: ICD-10-CM

## 2020-07-21 DIAGNOSIS — Z86.718 H/O DEEP VENOUS THROMBOSIS: ICD-10-CM

## 2020-07-21 DIAGNOSIS — I10 ESSENTIAL HYPERTENSION: ICD-10-CM

## 2020-07-21 DIAGNOSIS — I82.411 ACUTE DEEP VEIN THROMBOSIS (DVT) OF FEMORAL VEIN OF RIGHT LOWER EXTREMITY (HCC): Primary | ICD-10-CM

## 2020-07-21 DIAGNOSIS — M25.561 ACUTE PAIN OF RIGHT KNEE: ICD-10-CM
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
## 2020-07-21 DIAGNOSIS — E04.1 SOLITARY NODULE OF RIGHT LOBE OF THYROID: ICD-10-CM

## 2020-07-21 DIAGNOSIS — E11.9 TYPE 2 DIABETES MELLITUS WITHOUT COMPLICATION, WITHOUT LONG-TERM CURRENT USE OF INSULIN (HCC): ICD-10-CM

## 2020-07-21 DIAGNOSIS — M50.30 DDD (DEGENERATIVE DISC DISEASE), CERVICAL: ICD-10-CM

## 2020-07-21 DIAGNOSIS — R93.7 ABNORMAL CT SCAN, CERVICAL SPINE: ICD-10-CM

## 2020-07-21 DIAGNOSIS — K21.9 GASTROESOPHAGEAL REFLUX DISEASE, ESOPHAGITIS PRESENCE NOT SPECIFIED: ICD-10-CM

## 2020-07-23 ENCOUNTER — TELEPHONE (OUTPATIENT)
Dept: INTERNAL MEDICINE CLINIC | Age: 70
End: 2020-07-23

## 2020-07-23 DIAGNOSIS — M79.604 RIGHT LEG PAIN: ICD-10-CM

## 2020-07-23 DIAGNOSIS — I82.4Y1 ACUTE DEEP VEIN THROMBOSIS (DVT) OF PROXIMAL VEIN OF RIGHT LOWER EXTREMITY (HCC): Primary | ICD-10-CM

## 2020-07-23 NOTE — TELEPHONE ENCOUNTER
Attempted to call [facility] Sched[facility] several times to get patient scheduled. The automated recording states they are closed for a staff meeting. Unable to get through to anyone. Will try again first thing in the morning. Patient/wife aware.
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
## 2020-07-23 NOTE — TELEPHONE ENCOUNTER
Wife calling asking if Dr. Grayson Boland order and ultrasound of right knee. Says they discussed it was swollen at last visit and it is still like that. He has another test scheduled for tomorrow and they said they can do both if Dr mackenzie order today.

## 2020-07-23 NOTE — TELEPHONE ENCOUNTER
Called and spoke with patient. Reports noting increased pain and swelling in right leg. Known DVT diagnosed on 7/11/2020 at [facility] and on Eliquis, but states that it has worsened since then. Will proceed with right lower extremity duplex to reassess DVT. Please see if this can be scheduled urgently.

## 2020-07-24 ENCOUNTER — TELEPHONE (OUTPATIENT)
Dept: INTERNAL MEDICINE CLINIC | Age: 70
End: 2020-07-24

## 2020-07-24 ENCOUNTER — HOSPITAL ENCOUNTER (OUTPATIENT)
Dept: VASCULAR SURGERY | Age: 70
Discharge: HOME | End: 2020-07-24
Attending: INTERNAL MEDICINE
Payer: MEDICARE

## 2020-07-24 ENCOUNTER — HOSPITAL ENCOUNTER (OUTPATIENT)
Dept: ULTRASOUND IMAGING | Age: 70
Discharge: HOME | End: 2020-07-24
Attending: INTERNAL MEDICINE
Payer: MEDICARE

## 2020-07-24 DIAGNOSIS — E04.1 SOLITARY NODULE OF RIGHT LOBE OF THYROID: ICD-10-CM

## 2020-07-24 DIAGNOSIS — R93.7 ABNORMAL CT SCAN, CERVICAL SPINE: ICD-10-CM

## 2020-07-24 DIAGNOSIS — I82.4Y1 ACUTE DEEP VEIN THROMBOSIS (DVT) OF PROXIMAL VEIN OF RIGHT LOWER EXTREMITY (HCC): ICD-10-CM

## 2020-07-24 DIAGNOSIS — M79.604 RIGHT LEG PAIN: ICD-10-CM
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
## 2020-07-24 PROBLEM — M50.30 DDD (DEGENERATIVE DISC DISEASE), CERVICAL: Status: ACTIVE | Noted: 2020-07-24

## 2020-07-24 PROBLEM — I82.411 ACUTE DEEP VEIN THROMBOSIS (DVT) OF FEMORAL VEIN OF RIGHT LOWER EXTREMITY (HCC): Status: ACTIVE | Noted: 2020-07-24

## 2020-07-24 PROCEDURE — 76536 US EXAM OF HEAD AND NECK: CPT

## 2020-07-24 PROCEDURE — 93971 EXTREMITY STUDY: CPT

## 2020-07-24 NOTE — TELEPHONE ENCOUNTER
Please ask patient to schedule an in office visit in 2 weeks to evaluate his right leg. Would ask that he hold of on physical therapy until after visit.
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
## 2020-07-24 NOTE — TELEPHONE ENCOUNTER
Called and discussed results of repeat duplex with patient. Reassured that no extension of DVT when compared to report from [place], and advised to continue Eliquis. Also advised use of compression hose to help with swelling. Discussed possible referral to vascular surgery, but patient wishing to hold off for now. Has appointment scheduled with hematology next week to determine duration of treatment. Advised to hold off on physical therapy for knee pain for now. May begin once pain and swelling improves.

## 2020-07-24 NOTE — TELEPHONE ENCOUNTER
[facility] duplex (7/12/2020)  Poorly attached, acute and non-occlusive deep venous thrombosis in the right femoral veins. Acute, occlusive deep venous thrombosis in the right popliteal veins. Acute, non-occlusive deep venous thrombosis in the right posterior tibial and peroneal veins. Acute, occlusive muscular deep venous thrombosis in the right gastrocnemius calf veins. No need for ED evaluation. Patient already on Eliquis x 2 weeks for treatment of DVT. Patient had increasing swelling and pain, but compared with duplex report from [facility] and no significant change. Will call and discuss results with patient.

## 2020-07-24 NOTE — TELEPHONE ENCOUNTER
Central scheduling Recording still states in a meeting. Paged number for STAT scheduling with our call back number.

## 2020-07-24 NOTE — TELEPHONE ENCOUNTER
Ena from Vascular lab called with the following results:    Patient positive for Acute Occlusive DVT in right POP and Acute Occlusive DVT in gas. and no occlusion in distal femoral vein. Candice Maral wanted to know if patient should go to the ED or go home and await instructions from PCP. Candicegale Alicia was advised will send message to PCP, and patient was instructed to go to the ED if he should develop any SOB or chest pain.

## 2020-07-27 ENCOUNTER — TELEPHONE (OUTPATIENT)
Dept: INTERNAL MEDICINE CLINIC | Age: 70
End: 2020-07-27

## 2020-07-28 NOTE — TELEPHONE ENCOUNTER
US Results (most recent):  Results from [place] encounter on 07/24/20   US THYROID/PARATHYROID/SOFT TISS    Narrative THYROID ULTRASOUND:    INDICATION: Solitary nodule of right lobe of thyroid    COMPARISON: None    TECHNIQUE: Real-time ultrasonography of the thyroid gland was performed and  selected sagittal and transverse images submitted for review. FINDINGS:   Right thyroid: 5.7 x 2.7 x 2.3 cm. Multiple nodules are seen, the largest are  measured:  -Mid pole posterior predominant cystic nodule measures 1.6 x 1.6 x 1.1 cm.  -Adjacent midpole solid/cystic nodule measures 1.2 x 1.1 x 1.1 cm, wider than  tall.  -Lower pole predominantly solid nodule measures 1.3 x 1.1 x 1.0 cm, wider than  tall. Left thyroid: 5.1 x 2.4 x 2.0 cm. Multiple nodules are seen, the largest are  measured:  -Superior pole hypoechoic nodule measures 6 x 5 x 4 mm, wider than tall.  -Mid pole hypoechoic nodule measures 1.2 x 1.0 x 0.8 cm, wider than tall.  -Lower pole hypoechoic nodule measures 1.0 x 0.9 x 0.5 cm, wider than tall. Isthmus: 0.4 cm in thickness. Multiple nodules are seen, the largest are  measured:  -Hypoechoic nodule at the left aspect of the isthmus measures 5 x 4 x 3 mm. -Hypoechoic nodule at the right aspect of the isthmus measures 4 x 4 by 3 mm. Impression IMPRESSION:    1. Multinodular thyroid gland as described. -Recommend follow-up to ascertain stability. Please let patient know that thyroid ultrasound showed multiple nodules in his thyroid gland. None showed worrisome features that would currently require further evaluation, but recommendation is to obtain a repeat ultrasound in one year to assess the stability of the nodules. Also, please see previous telephone encounter with request for follow-up in office visit in 2 weeks. Please schedule if not yet done so. Thanks.

## 2020-07-28 NOTE — TELEPHONE ENCOUNTER
Pt aware of message below and verbalized understanding. Patient scheduled for 2 week follow up on 8/5/20 at 3 pm. No further questions or concerns from pt at this time.

## 2020-08-05 ENCOUNTER — OFFICE VISIT (OUTPATIENT)
Dept: INTERNAL MEDICINE CLINIC | Age: 70
End: 2020-08-05

## 2020-08-05 VITALS
TEMPERATURE: 98.6 F | OXYGEN SATURATION: 98 % | HEART RATE: 58 BPM | WEIGHT: 198 LBS | BODY MASS INDEX: 26.24 KG/M2 | SYSTOLIC BLOOD PRESSURE: 130 MMHG | HEIGHT: 73 IN | RESPIRATION RATE: 16 BRPM | DIASTOLIC BLOOD PRESSURE: 72 MMHG

## 2020-08-05 DIAGNOSIS — Z86.718 H/O DEEP VENOUS THROMBOSIS: ICD-10-CM

## 2020-08-05 DIAGNOSIS — I82.411 ACUTE DEEP VEIN THROMBOSIS (DVT) OF FEMORAL VEIN OF RIGHT LOWER EXTREMITY (HCC): ICD-10-CM

## 2020-08-05 DIAGNOSIS — R93.7 ABNORMAL CT SCAN, CERVICAL SPINE: ICD-10-CM

## 2020-08-05 DIAGNOSIS — M50.30 DDD (DEGENERATIVE DISC DISEASE), CERVICAL: ICD-10-CM

## 2020-08-05 DIAGNOSIS — M25.561 ACUTE PAIN OF RIGHT KNEE: ICD-10-CM

## 2020-08-05 DIAGNOSIS — I10 ESSENTIAL HYPERTENSION: ICD-10-CM

## 2020-08-05 DIAGNOSIS — E78.5 HYPERLIPIDEMIA, UNSPECIFIED HYPERLIPIDEMIA TYPE: ICD-10-CM

## 2020-08-05 DIAGNOSIS — E11.9 TYPE 2 DIABETES MELLITUS WITHOUT COMPLICATION, WITHOUT LONG-TERM CURRENT USE OF INSULIN (HCC): ICD-10-CM
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
## 2020-08-05 DIAGNOSIS — Z87.11 HISTORY OF GASTRIC ULCER: ICD-10-CM

## 2020-08-05 DIAGNOSIS — I82.4Y1 ACUTE DEEP VEIN THROMBOSIS (DVT) OF PROXIMAL VEIN OF RIGHT LOWER EXTREMITY (HCC): Primary | ICD-10-CM

## 2020-08-05 DIAGNOSIS — E04.2 MULTIPLE THYROID NODULES: ICD-10-CM

## 2020-08-05 DIAGNOSIS — Z80.42 FAMILY HISTORY OF PROSTATE CANCER: ICD-10-CM

## 2020-08-05 NOTE — PATIENT INSTRUCTIONS
Deep Vein Thrombosis: Care Instructions  Overview     A deep vein thrombosis (DVT) is a blood clot in certain veins, usually in the legs, pelvis, or arms. Blood clots in these veins need to be treated because they can get bigger, break loose, and travel through the bloodstream to the lungs. A blood clot in a lung can be life-threatening. The doctor may have given you a blood thinner (anticoagulant). A blood thinner can stop the blood clot from growing larger and prevent new clots from forming. You will need to take a blood thinner for 3 to 6 months or longer. The doctor has checked you carefully, but problems can develop later. If you notice any problems or new symptoms, get medical treatment right away. Follow-up care is a key part of your treatment and safety. Be sure to make and go to all appointments, and call your doctor if you are having problems. It's also a good idea to know your test results and keep a list of the medicines you take. How can you care for yourself at home? · Take your medicines exactly as prescribed. Call your doctor if you think you are having a problem with your medicine. · If you are taking a blood thinner, be sure you get instructions about how to take your medicine safely. Blood thinners can cause serious bleeding problems. · Wear compression stockings if your doctor recommends them. These stockings are tighter at the feet than on the legs. They may reduce pain and swelling in your legs. But there are different types of stockings, and they need to fit right. So your doctor will recommend what you need. · When you sit, use a pillow to raise the arm or leg that has the blood clot. Try to keep it above the level of your heart. When should you call for help? [id number] anytime you think you may need emergency care. For example, call if:  · You passed out (lost consciousness). · You have symptoms of a blood clot in your lung (called a pulmonary embolism).  These include:  ? Sudden chest pain. ? Trouble breathing. ? Coughing up blood. Call your doctor now or seek immediate medical care if:  · You have new or worse trouble breathing. · You are dizzy or lightheaded, or you feel like you may faint. · You have symptoms of a blood clot in your arm or leg. These may include:  ? Pain in the arm, calf, back of the knee, thigh, or groin. ? Redness and swelling in the arm, leg, or groin. Watch closely for changes in your health, and be sure to contact your doctor if:  · You do not get better as expected. Where can you learn more? Go to http://yanet-sergio.info/  Enter K426 in the search box to learn more about \"Deep Vein Thrombosis: Care Instructions. \"  Current as of: March 4, 2020               Content Version: 12.5  © 3689-3964 [employer]. Care instructions adapted under license by [employer] (which disclaims liability or warranty for this information). If you have questions about a medical condition or this instruction, always ask your healthcare professional. [address] any warranty or liability for your use of this information. Learning About Deep Vein Thrombosis  What is a deep vein thrombosis (DVT)? A deep vein thrombosis (DVT) is a blood clot (thrombus) in a deep vein, usually in the legs. A DVT can be dangerous because it can break loose and travel through the bloodstream to the lungs. There it can block blood flow in the lungs (pulmonary embolism). This can be life-threatening. What are the symptoms? Deep vein thrombosis often doesn't cause symptoms. Or it may cause only minor ones. When symptoms happen, they include:  · Swelling in the affected area. · Redness and warmth in the affected area. · Pain or tenderness. You may have pain only when you touch the affected area or when you stand or walk. Sometimes a pulmonary embolism is the first sign that you have DVT.   If your doctor thinks you may have DVT, you will probably have an ultrasound test. You may have other tests as well. What causes deep vein thrombosis (DVT)? Causes of a blood clot in a deep vein (DVT) include:  · Slowed blood flow. This can happen when you're not active for long periods of time. For example, clots can form if you are paralyzed, are confined to bed, or must sit while on a long flight or car trip. · Abnormal clotting problems that make the blood clot too easily or too quickly. For example, some people have blood that clots too easily, a problem that may run in families. · Surgery or an injury to the blood vessels. Blood is more likely to clot in veins shortly after they are injured. · Cancer. How can you prevent DVT? · Exercise your lower leg muscles to help blood flow in your legs. Point your toes up toward your head so the calves of your legs are stretched, then relax and repeat. This is a good exercise to do when you are sitting for long periods of time. · Get out of bed as soon as you can after an illness or surgery. If you need to stay in bed, do the leg exercise noted above every hour when you are awake. · Use special stockings called compression stockings. These stockings are tight at the feet with a gradually looser fit on the leg. Many doctors recommend that you wear compression stockings during a journey longer than 8 hours. · Take breaks when you are on long trips. Stop the car and walk around. On long airplane flights, walk up and down the aisle hourly, and flex and point your feet every 20 minutes while sitting. · Take blood-thinning medicines after some types of surgery if your doctor recommends it. Blood thinners also may be used if you are likely to develop clots. How is DVT treated? Treatment for DVT usually involves taking blood thinners. These medicines are given through a vein (intravenously, or IV) or as a pill. Talk with your doctor about which medicine is right for you.   Your doctor also may suggest that you prop up or elevate your leg when possible, take walks, and wear compression stockings. These measures may help reduce the pain and swelling that can happen with DVT. Follow-up care is a key part of your treatment and safety. Be sure to make and go to all appointments, and call your doctor if you are having problems. It's also a good idea to know your test results and keep a list of the medicines you take. Where can you learn more? Go to http://www.gray.com/  Enter X941 in the search box to learn more about \"Learning About Deep Vein Thrombosis. \"  Current as of: March 4, 2020               Content Version: 12.5  © 2006-2020 [employer]. Care instructions adapted under license by [employer] (which disclaims liability or warranty for this information). If you have questions about a medical condition or this instruction, always ask your healthcare professional. [address] any warranty or liability for your use of this information.

## 2020-08-08 PROBLEM — E04.2 MULTIPLE THYROID NODULES: Status: ACTIVE | Noted: 2020-07-24
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
## 2020-08-17 ENCOUNTER — TELEPHONE (OUTPATIENT)
Dept: INTERNAL MEDICINE CLINIC | Age: 70
End: 2020-08-17

## 2020-08-17 NOTE — TELEPHONE ENCOUNTER
Physical therapist asking to speak to nurse. Says he needs to confirm something in the patients medical history before he sees him.

## 2020-08-17 NOTE — TELEPHONE ENCOUNTER
Spoke with Physical Therapist Sharon Mcdonald at [facility] ([phone]). They had received and reviewed the most recent office note from Dr. Lazarus Maus and had some follow up questions. Sharon Mcdonald stated they were aware of patients recent DVT and that they have not been addressing the right leg pain due to the DVT and patient following up with Vascular for further treatment and directions. Patient has appointment with ALYSE Salazar on 9/3/20. Therapy has been focusing on the patients neck and back pain and working on mobility activities. He reviewed the note and saw where the patient has thyroid nodules that will be followed up on in about a year and wants to clarify that it is safe for them to continue to treat the patient for the neck and back pain. He stated he would like to move on to gentle strengthening exercises with the patient but wants to verify this is safe first. Sharon Mcdonald stated he understands the patient has several things going on and just wants to clarify that it is safe/appropriate for them to continue to treat the patient for the neck and back pain. Informed him I would send this message to Dr. Lazarus Maus and we will call him back with a response. No further questions or concerns at this time.

## 2020-08-23 RX ORDER — ATORVASTATIN CALCIUM 40 MG/1
TABLET, FILM COATED ORAL
Qty: 90 TAB | Refills: 2 | Status: SHIPPED | OUTPATIENT
Start: 2020-08-23 | End: 2021-05-31

## 2020-09-03 ENCOUNTER — OFFICE VISIT (OUTPATIENT)
Dept: VASCULAR SURGERY | Age: 70
End: 2020-09-03

## 2020-09-03 VITALS
BODY MASS INDEX: 26.24 KG/M2 | RESPIRATION RATE: 16 BRPM | WEIGHT: 198 LBS | OXYGEN SATURATION: 99 % | HEART RATE: 71 BPM | DIASTOLIC BLOOD PRESSURE: 72 MMHG | SYSTOLIC BLOOD PRESSURE: 138 MMHG | HEIGHT: 73 IN

## 2020-09-03 DIAGNOSIS — I82.4Y1 ACUTE DEEP VEIN THROMBOSIS (DVT) OF PROXIMAL VEIN OF RIGHT LOWER EXTREMITY (HCC): Primary | ICD-10-CM

## 2020-09-03 DIAGNOSIS — M79.604 RIGHT LEG PAIN: ICD-10-CM
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
## 2020-12-01 ENCOUNTER — APPOINTMENT (OUTPATIENT)
Dept: INTERNAL MEDICINE CLINIC | Age: 70
End: 2020-12-01

## 2020-12-01 ENCOUNTER — HOSPITAL ENCOUNTER (OUTPATIENT)
Dept: LAB | Age: 70
Discharge: HOME | End: 2020-12-01
Payer: MEDICARE

## 2020-12-01 DIAGNOSIS — M25.561 ACUTE PAIN OF RIGHT KNEE: ICD-10-CM

## 2020-12-01 DIAGNOSIS — E78.5 HYPERLIPIDEMIA, UNSPECIFIED HYPERLIPIDEMIA TYPE: ICD-10-CM

## 2020-12-01 DIAGNOSIS — E11.9 TYPE 2 DIABETES MELLITUS WITHOUT COMPLICATION, WITHOUT LONG-TERM CURRENT USE OF INSULIN (HCC): ICD-10-CM

## 2020-12-01 DIAGNOSIS — Z80.42 FAMILY HISTORY OF PROSTATE CANCER: ICD-10-CM

## 2020-12-01 DIAGNOSIS — I10 ESSENTIAL HYPERTENSION: ICD-10-CM

## 2020-12-01 DIAGNOSIS — K21.9 GASTROESOPHAGEAL REFLUX DISEASE: ICD-10-CM

## 2020-12-01 DIAGNOSIS — Z86.718 H/O DEEP VENOUS THROMBOSIS: ICD-10-CM

## 2020-12-01 DIAGNOSIS — E55.9 VITAMIN D INSUFFICIENCY: ICD-10-CM

## 2020-12-01 DIAGNOSIS — Z12.5 ENCOUNTER FOR SCREENING FOR MALIGNANT NEOPLASM OF PROSTATE: ICD-10-CM

## 2020-12-01 LAB
25(OH)D3 SERPL-MCNC: 43 NG/ML (ref 30–100)
ALBUMIN SERPL-MCNC: 3.8 G/DL (ref 3.4–5)
ALBUMIN/GLOB SERPL: 1.3 {RATIO} (ref 0.8–1.7)
ALP SERPL-CCNC: 91 U/L (ref 45–117)
ALT SERPL-CCNC: 30 U/L (ref 16–61)
ANION GAP SERPL CALC-SCNC: 4 MMOL/L (ref 3–18)
APPEARANCE UR: CLEAR
AST SERPL-CCNC: 14 U/L (ref 10–38)
BASOPHILS # BLD: 0 K/UL (ref 0–0.1)
BASOPHILS NFR BLD: 0 % (ref 0–2)
BILIRUB SERPL-MCNC: 0.4 MG/DL (ref 0.2–1)
BILIRUB UR QL: NEGATIVE
BUN SERPL-MCNC: 14 MG/DL (ref 7–18)
BUN/CREAT SERPL: 14 (ref 12–20)
CALCIUM SERPL-MCNC: 9.1 MG/DL (ref 8.5–10.1)
CHLORIDE SERPL-SCNC: 111 MMOL/L (ref 100–111)
CHOLEST SERPL-MCNC: 136 MG/DL
CO2 SERPL-SCNC: 28 MMOL/L (ref 21–32)
COLOR UR: YELLOW
CREAT SERPL-MCNC: 0.98 MG/DL (ref 0.6–1.3)
CREAT UR-MCNC: 263 MG/DL (ref 30–125)
DIFFERENTIAL METHOD BLD: NORMAL
EOSINOPHIL # BLD: 0.1 K/UL (ref 0–0.4)
EOSINOPHIL NFR BLD: 2 % (ref 0–5)
ERYTHROCYTE [DISTWIDTH] IN BLOOD BY AUTOMATED COUNT: 13.6 % (ref 11.6–14.5)
EST. AVERAGE GLUCOSE BLD GHB EST-MCNC: 134 MG/DL
GLOBULIN SER CALC-MCNC: 2.9 G/DL (ref 2–4)
GLUCOSE SERPL-MCNC: 114 MG/DL (ref 74–99)
GLUCOSE UR STRIP.AUTO-MCNC: NEGATIVE MG/DL
HBA1C MFR BLD: 6.3 % (ref 4.2–5.6)
HCT VFR BLD AUTO: 43.1 % (ref 36–48)
HDLC SERPL-MCNC: 49 MG/DL (ref 40–60)
HDLC SERPL: 2.8 {RATIO} (ref 0–5)
HGB BLD-MCNC: 13.7 G/DL (ref 13–16)
HGB UR QL STRIP: NEGATIVE
KETONES UR QL STRIP.AUTO: ABNORMAL MG/DL
LDLC SERPL CALC-MCNC: 72.8 MG/DL (ref 0–100)
LEUKOCYTE ESTERASE UR QL STRIP.AUTO: NEGATIVE
LIPID PROFILE,FLP: NORMAL
LYMPHOCYTES # BLD: 2.4 K/UL (ref 0.9–3.6)
LYMPHOCYTES NFR BLD: 49 % (ref 21–52)
MAGNESIUM SERPL-MCNC: 1.8 MG/DL (ref 1.6–2.6)
MCH RBC QN AUTO: 26.4 PG (ref 24–34)
MCHC RBC AUTO-ENTMCNC: 31.8 G/DL (ref 31–37)
MCV RBC AUTO: 83 FL (ref 74–97)
MICROALBUMIN UR-MCNC: 1.82 MG/DL (ref 0–3)
MICROALBUMIN/CREAT UR-RTO: 7 MG/G (ref 0–30)
MONOCYTES # BLD: 0.4 K/UL (ref 0.05–1.2)
MONOCYTES NFR BLD: 8 % (ref 3–10)
NEUTS SEG # BLD: 2 K/UL (ref 1.8–8)
NEUTS SEG NFR BLD: 41 % (ref 40–73)
NITRITE UR QL STRIP.AUTO: NEGATIVE
PH UR STRIP: 5.5 [PH] (ref 5–8)
PLATELET # BLD AUTO: 209 K/UL (ref 135–420)
PMV BLD AUTO: 10.9 FL (ref 9.2–11.8)
POTASSIUM SERPL-SCNC: 3.9 MMOL/L (ref 3.5–5.5)
PROT SERPL-MCNC: 6.7 G/DL (ref 6.4–8.2)
PROT UR STRIP-MCNC: NEGATIVE MG/DL
PSA SERPL-MCNC: 1.5 NG/ML (ref 0–4)
RBC # BLD AUTO: 5.19 M/UL (ref 4.7–5.5)
SODIUM SERPL-SCNC: 143 MMOL/L (ref 136–145)
SP GR UR REFRACTOMETRY: 1.03 (ref 1–1.03)
TRIGL SERPL-MCNC: 71 MG/DL (ref ?–150)
TSH SERPL DL<=0.05 MIU/L-ACNC: 1.02 UIU/ML (ref 0.36–3.74)
UROBILINOGEN UR QL STRIP.AUTO: 1 EU/DL (ref 0.2–1)
VLDLC SERPL CALC-MCNC: 14.2 MG/DL
WBC # BLD AUTO: 4.9 K/UL (ref 4.6–13.2)

## 2020-12-01 PROCEDURE — 84443 ASSAY THYROID STIM HORMONE: CPT

## 2020-12-01 PROCEDURE — 80053 COMPREHEN METABOLIC PANEL: CPT

## 2020-12-01 PROCEDURE — 82043 UR ALBUMIN QUANTITATIVE: CPT

## 2020-12-01 PROCEDURE — 81003 URINALYSIS AUTO W/O SCOPE: CPT

## 2020-12-01 PROCEDURE — 83735 ASSAY OF MAGNESIUM: CPT

## 2020-12-01 PROCEDURE — 80061 LIPID PANEL: CPT

## 2020-12-01 PROCEDURE — 85025 COMPLETE CBC W/AUTO DIFF WBC: CPT

## 2020-12-01 PROCEDURE — 82306 VITAMIN D 25 HYDROXY: CPT

## 2020-12-01 PROCEDURE — 84153 ASSAY OF PSA TOTAL: CPT

## 2020-12-01 PROCEDURE — 36415 COLL VENOUS BLD VENIPUNCTURE: CPT

## 2020-12-01 PROCEDURE — 83036 HEMOGLOBIN GLYCOSYLATED A1C: CPT

## 2020-12-08 ENCOUNTER — TELEPHONE (OUTPATIENT)
Dept: INTERNAL MEDICINE CLINIC | Age: 70
End: 2020-12-08

## 2020-12-08 ENCOUNTER — OFFICE VISIT (OUTPATIENT)
Dept: INTERNAL MEDICINE CLINIC | Age: 70
End: 2020-12-08
Payer: MEDICARE

## 2020-12-08 VITALS
OXYGEN SATURATION: 99 % | HEART RATE: 73 BPM | BODY MASS INDEX: 26.77 KG/M2 | WEIGHT: 202 LBS | RESPIRATION RATE: 16 BRPM | DIASTOLIC BLOOD PRESSURE: 79 MMHG | SYSTOLIC BLOOD PRESSURE: 130 MMHG | TEMPERATURE: 97.7 F | HEIGHT: 73 IN

## 2020-12-08 DIAGNOSIS — I10 ESSENTIAL HYPERTENSION: ICD-10-CM

## 2020-12-08 DIAGNOSIS — Z87.11 HISTORY OF GASTRIC ULCER: ICD-10-CM

## 2020-12-08 DIAGNOSIS — I82.409 RECURRENT DEEP VEIN THROMBOSIS (DVT) (HCC): ICD-10-CM

## 2020-12-08 DIAGNOSIS — E11.9 TYPE 2 DIABETES MELLITUS WITHOUT COMPLICATION, WITHOUT LONG-TERM CURRENT USE OF INSULIN (HCC): Primary | ICD-10-CM

## 2020-12-08 DIAGNOSIS — E04.2 MULTIPLE THYROID NODULES: ICD-10-CM

## 2020-12-08 DIAGNOSIS — Z80.42 FAMILY HISTORY OF PROSTATE CANCER: ICD-10-CM
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
## 2020-12-08 DIAGNOSIS — I82.411 ACUTE DEEP VEIN THROMBOSIS (DVT) OF FEMORAL VEIN OF RIGHT LOWER EXTREMITY (HCC): ICD-10-CM

## 2020-12-08 DIAGNOSIS — E78.5 HYPERLIPIDEMIA, UNSPECIFIED HYPERLIPIDEMIA TYPE: ICD-10-CM

## 2020-12-08 DIAGNOSIS — K21.9 GASTROESOPHAGEAL REFLUX DISEASE, UNSPECIFIED WHETHER ESOPHAGITIS PRESENT: ICD-10-CM

## 2020-12-08 DIAGNOSIS — M50.30 DDD (DEGENERATIVE DISC DISEASE), CERVICAL: ICD-10-CM

## 2020-12-08 PROCEDURE — G8510 SCR DEP NEG, NO PLAN REQD: HCPCS | Performed by: INTERNAL MEDICINE

## 2020-12-08 PROCEDURE — G0463 HOSPITAL OUTPT CLINIC VISIT: HCPCS | Performed by: INTERNAL MEDICINE

## 2020-12-08 PROCEDURE — G8536 NO DOC ELDER MAL SCRN: HCPCS | Performed by: INTERNAL MEDICINE

## 2020-12-08 PROCEDURE — 3044F HG A1C LEVEL LT 7.0%: CPT | Performed by: INTERNAL MEDICINE

## 2020-12-08 PROCEDURE — 99214 OFFICE O/P EST MOD 30 MIN: CPT | Performed by: INTERNAL MEDICINE

## 2020-12-08 PROCEDURE — G8752 SYS BP LESS 140: HCPCS | Performed by: INTERNAL MEDICINE

## 2020-12-08 PROCEDURE — G8427 DOCREV CUR MEDS BY ELIG CLIN: HCPCS | Performed by: INTERNAL MEDICINE

## 2020-12-08 PROCEDURE — 3017F COLORECTAL CA SCREEN DOC REV: CPT | Performed by: INTERNAL MEDICINE

## 2020-12-08 PROCEDURE — 1101F PT FALLS ASSESS-DOCD LE1/YR: CPT | Performed by: INTERNAL MEDICINE

## 2020-12-08 PROCEDURE — G8419 CALC BMI OUT NRM PARAM NOF/U: HCPCS | Performed by: INTERNAL MEDICINE

## 2020-12-08 PROCEDURE — 2022F DILAT RTA XM EVC RTNOPTHY: CPT | Performed by: INTERNAL MEDICINE

## 2020-12-08 PROCEDURE — G8754 DIAS BP LESS 90: HCPCS | Performed by: INTERNAL MEDICINE

## 2020-12-08 NOTE — PROGRESS NOTES
1. Have you been to the ER, urgent care clinic or hospitalized since your last visit? NO.     2. Have you seen or consulted any other health care providers outside of the [address] [place] [address] since your last visit (Include any pap smears or colon screening)?  NO

## 2020-12-08 NOTE — PATIENT INSTRUCTIONS
Heart-Healthy Diet: Care Instructions  Your Care Instructions     A heart-healthy diet has lots of vegetables, fruits, nuts, beans, and whole grains, and is low in salt. It limits foods that are high in saturated fat, such as meats, cheeses, and fried foods. It may be hard to change your diet, but even small changes can lower your risk of heart attack and heart disease. Follow-up care is a key part of your treatment and safety. Be sure to make and go to all appointments, and call your doctor if you are having problems. It's also a good idea to know your test results and keep a list of the medicines you take. How can you care for yourself at home? Watch your portions  · Learn what a serving is. A \"serving\" and a \"portion\" are not always the same thing. Make sure that you are not eating larger portions than are recommended. For example, a serving of pasta is ½ cup. A serving size of meat is 2 to 3 ounces. A 3-ounce serving is about the size of a deck of cards. Measure serving sizes until you are good at [place]" them. Keep in mind that restaurants often serve portions that are 2 or 3 times the size of one serving. · To keep your energy level up and keep you from feeling hungry, eat often but in smaller portions. · Eat only the number of calories you need to stay at a healthy weight. If you need to lose weight, eat fewer calories than your body burns (through exercise and other physical activity). Eat more fruits and vegetables  · Eat a variety of fruit and vegetables every day. Dark green, deep orange, red, or yellow fruits and vegetables are especially good for you. Examples include spinach, carrots, peaches, and berries. · Keep carrots, celery, and other veggies handy for snacks. Buy fruit that is in season and store it where you can see it so that you will be tempted to eat it. · Cook dishes that have a lot of veggies in them, such as stir-fries and soups.   Limit saturated and trans fat  · Read food labels, and try to avoid saturated and trans fats. They increase your risk of heart disease. · Use olive or canola oil when you cook. · Bake, broil, grill, or steam foods instead of frying them. · Choose lean meats instead of high-fat meats such as hot dogs and sausages. Cut off all visible fat when you prepare meat. · Eat fish, skinless poultry, and meat alternatives such as soy products instead of high-fat meats. Soy products, such as tofu, may be especially good for your heart. · Choose low-fat or fat-free milk and dairy products. Eat foods high in fiber  · Eat a variety of grain products every day. Include whole-grain foods that have lots of fiber and nutrients. Examples of whole-grain foods include oats, whole wheat bread, and brown rice. · Buy whole-grain breads and cereals, instead of white bread or pastries. Limit salt and sodium  · Limit how much salt and sodium you eat to help lower your blood pressure. · Taste food before you salt it. Add only a little salt when you think you need it. With time, your taste buds will adjust to less salt. · Eat fewer snack items, fast foods, and other high-salt, processed foods. Check food labels for the amount of sodium in packaged foods. · Choose low-sodium versions of canned goods (such as soups, vegetables, and beans). Limit sugar  · Limit drinks and foods with added sugar. These include candy, desserts, and soda pop. Limit alcohol  · Limit alcohol to no more than 2 drinks a day for men and 1 drink a day for women. Too much alcohol can cause health problems. When should you call for help? Watch closely for changes in your health, and be sure to contact your doctor if:    · You would like help planning heart-healthy meals. Where can you learn more? Go to http://www.[employer].com/  Enter V137 in the search box to learn more about \"Heart-Healthy Diet: Care Instructions. \"  Current as of: August 22, 2019               Content Version: 12.6  © 3049-1746 [employer]. Care instructions adapted under license by [employer] (which disclaims liability or warranty for this information). If you have questions about a medical condition or this instruction, always ask your healthcare professional. [address] any warranty or liability for your use of this information. Learning About Diabetes Food Guidelines  Your Care Instructions     Meal planning is important to manage diabetes. It helps keep your blood sugar at a target level (which you set with your doctor). You don't have to eat special foods. You can eat what your family eats, including sweets once in a while. But you do have to pay attention to how often you eat and how much you eat of certain foods. You may want to work with a dietitian or a certified diabetes educator (CDE) to help you plan meals and snacks. A dietitian or CDE can also help you lose weight if that is one of your goals. What should you know about eating carbs? Managing the amount of carbohydrate (carbs) you eat is an important part of healthy meals when you have diabetes. Carbohydrate is found in many foods. · Learn which foods have carbs. And learn the amounts of carbs in different foods. ? Bread, cereal, pasta, and rice have about 15 grams of carbs in a serving. A serving is 1 slice of bread (1 ounce), ½ cup of cooked cereal, or 1/3 cup of cooked pasta or rice. ? Fruits have 15 grams of carbs in a serving. A serving is 1 small fresh fruit, such as an apple or orange; ½ of a banana; ½ cup of cooked or canned fruit; ½ cup of fruit juice; 1 cup of melon or raspberries; or 2 tablespoons of dried fruit. ? Milk and no-sugar-added yogurt have 15 grams of carbs in a serving. A serving is 1 cup of milk or 2/3 cup of no-sugar-added yogurt. ? Starchy vegetables have 15 grams of carbs in a serving.  A serving is ½ cup of mashed potatoes or sweet potato; 1 cup winter squash; ½ of a small baked potato; ½ cup of cooked beans; or ½ cup cooked corn or green peas. · Learn how much carbs to eat each day and at each meal. A dietitian or CDE can teach you how to keep track of the amount of carbs you eat. This is called carbohydrate counting. · If you are not sure how to count carbohydrate grams, use the Plate Method to plan meals. It is a good, quick way to make sure that you have a balanced meal. It also helps you spread carbs throughout the day. ? Divide your plate by types of foods. Put non-starchy vegetables on half the plate, meat or other protein food on one-quarter of the plate, and a grain or starchy vegetable in the final quarter of the plate. To this you can add a small piece of fruit and 1 cup of milk or yogurt, depending on how many carbs you are supposed to eat at a meal.  · Try to eat about the same amount of carbs at each meal. Do not \"save up\" your daily allowance of carbs to eat at one meal.  · Proteins have very little or no carbs per serving. Examples of proteins are beef, chicken, turkey, fish, eggs, tofu, cheese, cottage cheese, and peanut butter. A serving size of meat is 3 ounces, which is about the size of a deck of cards. Examples of meat substitute serving sizes (equal to 1 ounce of meat) are 1/4 cup of cottage cheese, 1 egg, 1 tablespoon of peanut butter, and ½ cup of tofu. How can you eat out and still eat healthy? · Learn to estimate the serving sizes of foods that have carbohydrate. If you measure food at home, it will be easier to estimate the amount in a serving of restaurant food. · If the meal you order has too much carbohydrate (such as potatoes, corn, or baked beans), ask to have a low-carbohydrate food instead. Ask for a salad or green vegetables. · If you use insulin, check your blood sugar before and after eating out to help you plan how much to eat in the future.   · If you eat more carbohydrate at a meal than you had planned, take a walk or do other exercise. This will help lower your blood sugar. What else should you know? · Limit saturated fat, such as the fat from meat and dairy products. This is a healthy choice because people who have diabetes are at higher risk of heart disease. So choose lean cuts of meat and nonfat or low-fat dairy products. Use olive or canola oil instead of butter or shortening when cooking. · Don't skip meals. Your blood sugar may drop too low if you skip meals and take insulin or certain medicines for diabetes. · Check with your doctor before you drink alcohol. Alcohol can cause your blood sugar to drop too low. Alcohol can also cause a bad reaction if you take certain diabetes medicines. Follow-up care is a key part of your treatment and safety. Be sure to make and go to all appointments, and call your doctor if you are having problems. It's also a good idea to know your test results and keep a list of the medicines you take. Where can you learn more? Go to http://www.[employer].com/  Enter I147 in the search box to learn more about \"Learning About Diabetes Food Guidelines. \"  Current as of: December 20, 2019               Content Version: 12.6  © 5397-4232 [employer]. Care instructions adapted under license by [employer] (which disclaims liability or warranty for this information). If you have questions about a medical condition or this instruction, always ask your healthcare professional. [address] any warranty or liability for your use of this information.

## 2020-12-11 PROBLEM — R93.7 ABNORMAL CT SCAN, CERVICAL SPINE: Status: RESOLVED | Noted: 2020-07-24 | Resolved: 2020-12-11
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
## 2021-01-27 NOTE — TELEPHONE ENCOUNTER
Last Visit: 12/8/20 with MD Hawk Panda  Next Appointment: 6/8/21 with MD Lenz  Previous Refill Encounter(s): 7/21/20 #180 with 1 refill    Requested Prescriptions     Pending Prescriptions Disp Refills    apixaban (ELIQUIS) 5 mg tablet 180 Tab 1     Sig: Take 1 Tab by mouth two (2) times a day.  Indications: blood clot in a deep vein of the extremities

## 2021-04-13 RX ORDER — METFORMIN HYDROCHLORIDE 500 MG/1
TABLET ORAL
Qty: 180 TAB | Refills: 2 | Status: SHIPPED | OUTPATIENT
Start: 2021-04-13 | End: 2022-01-08

## 2021-05-31 RX ORDER — ATORVASTATIN CALCIUM 40 MG/1
TABLET, FILM COATED ORAL
Qty: 90 TABLET | Refills: 2 | Status: SHIPPED | OUTPATIENT
Start: 2021-05-31 | End: 2022-02-18

## 2021-06-03 ENCOUNTER — TELEPHONE (OUTPATIENT)
Dept: INTERNAL MEDICINE CLINIC | Age: 71
End: 2021-06-03

## 2021-06-10 LAB
ALBUMIN SERPL-MCNC: 4.2 G/DL (ref 3.8–4.8)
ALBUMIN/CREAT UR: 4 MG/G CREAT (ref 0–29)
ALBUMIN/GLOB SERPL: 1.7 {RATIO} (ref 1.2–2.2)
ALP SERPL-CCNC: 78 IU/L (ref 48–121)
ALT SERPL-CCNC: 18 IU/L (ref 0–44)
AST SERPL-CCNC: 15 IU/L (ref 0–40)
BASOPHILS # BLD AUTO: 0 X10E3/UL (ref 0–0.2)
BASOPHILS NFR BLD AUTO: 1 %
BILIRUB SERPL-MCNC: 0.6 MG/DL (ref 0–1.2)
BUN SERPL-MCNC: 20 MG/DL (ref 8–27)
BUN/CREAT SERPL: 18 (ref 10–24)
CALCIUM SERPL-MCNC: 9.4 MG/DL (ref 8.6–10.2)
CHLORIDE SERPL-SCNC: 106 MMOL/L (ref 96–106)
CHOLEST SERPL-MCNC: 136 MG/DL (ref 100–199)
CO2 SERPL-SCNC: 23 MMOL/L (ref 20–29)
CREAT SERPL-MCNC: 1.1 MG/DL (ref 0.76–1.27)
CREAT UR-MCNC: 164.3 MG/DL
EOSINOPHIL # BLD AUTO: 0.1 X10E3/UL (ref 0–0.4)
EOSINOPHIL NFR BLD AUTO: 2 %
ERYTHROCYTE [DISTWIDTH] IN BLOOD BY AUTOMATED COUNT: 13 % (ref 11.6–15.4)
EST. AVERAGE GLUCOSE BLD GHB EST-MCNC: 146 MG/DL
GLOBULIN SER CALC-MCNC: 2.5 G/DL (ref 1.5–4.5)
GLUCOSE SERPL-MCNC: 119 MG/DL (ref 65–99)
HBA1C MFR BLD: 6.7 % (ref 4.8–5.6)
HCT VFR BLD AUTO: 42.2 % (ref 37.5–51)
HDLC SERPL-MCNC: 42 MG/DL
HGB BLD-MCNC: 13.2 G/DL (ref 13–17.7)
IMM GRANULOCYTES # BLD AUTO: 0 X10E3/UL (ref 0–0.1)
IMM GRANULOCYTES NFR BLD AUTO: 0 %
IMP & REVIEW OF LAB RESULTS: NORMAL
LDLC SERPL CALC-MCNC: 81 MG/DL (ref 0–99)
LYMPHOCYTES # BLD AUTO: 1.9 X10E3/UL (ref 0.7–3.1)
LYMPHOCYTES NFR BLD AUTO: 44 %
MAGNESIUM SERPL-MCNC: 1.7 MG/DL (ref 1.6–2.3)
MCH RBC QN AUTO: 26.1 PG (ref 26.6–33)
MCHC RBC AUTO-ENTMCNC: 31.3 G/DL (ref 31.5–35.7)
MCV RBC AUTO: 84 FL (ref 79–97)
MICROALBUMIN UR-MCNC: 7.2 UG/ML
MONOCYTES # BLD AUTO: 0.4 X10E3/UL (ref 0.1–0.9)
MONOCYTES NFR BLD AUTO: 9 %
NEUTROPHILS # BLD AUTO: 1.9 X10E3/UL (ref 1.4–7)
NEUTROPHILS NFR BLD AUTO: 44 %
PLATELET # BLD AUTO: 201 X10E3/UL (ref 150–450)
POTASSIUM SERPL-SCNC: 4.6 MMOL/L (ref 3.5–5.2)
PROT SERPL-MCNC: 6.7 G/DL (ref 6–8.5)
RBC # BLD AUTO: 5.05 X10E6/UL (ref 4.14–5.8)
SODIUM SERPL-SCNC: 142 MMOL/L (ref 134–144)
TRIGL SERPL-MCNC: 61 MG/DL (ref 0–149)
VLDLC SERPL CALC-MCNC: 13 MG/DL (ref 5–40)
WBC # BLD AUTO: 4.4 X10E3/UL (ref 3.4–10.8)

## 2021-06-15 ENCOUNTER — TELEPHONE (OUTPATIENT)
Dept: INTERNAL MEDICINE CLINIC | Age: 71
End: 2021-06-15

## 2021-06-15 NOTE — TELEPHONE ENCOUNTER
[facility] to move appt to earlier time tomorrow. Dr. King Jurist had cancellations at 8:30 and 9:30.

## 2021-06-16 ENCOUNTER — TELEPHONE (OUTPATIENT)
Dept: INTERNAL MEDICINE CLINIC | Age: 71
End: 2021-06-16

## 2021-06-16 ENCOUNTER — OFFICE VISIT (OUTPATIENT)
Dept: INTERNAL MEDICINE CLINIC | Age: 71
End: 2021-06-16
Payer: MEDICARE

## 2021-06-16 VITALS
HEART RATE: 82 BPM | DIASTOLIC BLOOD PRESSURE: 76 MMHG | RESPIRATION RATE: 16 BRPM | TEMPERATURE: 98 F | BODY MASS INDEX: 26.24 KG/M2 | OXYGEN SATURATION: 95 % | HEIGHT: 73 IN | SYSTOLIC BLOOD PRESSURE: 104 MMHG | WEIGHT: 198 LBS

## 2021-06-16 DIAGNOSIS — Z71.89 ADVANCED DIRECTIVES, COUNSELING/DISCUSSION: ICD-10-CM

## 2021-06-16 DIAGNOSIS — E66.3 OVERWEIGHT (BMI 25.0-29.9): ICD-10-CM

## 2021-06-16 DIAGNOSIS — Z12.5 PROSTATE CANCER SCREENING: ICD-10-CM

## 2021-06-16 DIAGNOSIS — I10 ESSENTIAL HYPERTENSION: ICD-10-CM

## 2021-06-16 DIAGNOSIS — Z80.42 FAMILY HISTORY OF PROSTATE CANCER: ICD-10-CM

## 2021-06-16 DIAGNOSIS — Z13.31 SCREENING FOR DEPRESSION: ICD-10-CM

## 2021-06-16 DIAGNOSIS — E78.5 HYPERLIPIDEMIA, UNSPECIFIED HYPERLIPIDEMIA TYPE: ICD-10-CM

## 2021-06-16 DIAGNOSIS — E55.9 VITAMIN D INSUFFICIENCY: ICD-10-CM

## 2021-06-16 DIAGNOSIS — E04.2 MULTIPLE THYROID NODULES: ICD-10-CM

## 2021-06-16 DIAGNOSIS — Z00.00 MEDICARE ANNUAL WELLNESS VISIT, SUBSEQUENT: ICD-10-CM

## 2021-06-16 DIAGNOSIS — I82.409 RECURRENT DEEP VEIN THROMBOSIS (DVT) (HCC): ICD-10-CM

## 2021-06-16 DIAGNOSIS — I82.511 CHRONIC DEEP VEIN THROMBOSIS (DVT) OF FEMORAL VEIN OF RIGHT LOWER EXTREMITY (HCC): Primary | ICD-10-CM

## 2021-06-16 DIAGNOSIS — E11.9 TYPE 2 DIABETES MELLITUS WITHOUT COMPLICATION, WITHOUT LONG-TERM CURRENT USE OF INSULIN (HCC): ICD-10-CM

## 2021-06-16 PROCEDURE — G0463 HOSPITAL OUTPT CLINIC VISIT: HCPCS | Performed by: INTERNAL MEDICINE

## 2021-06-16 PROCEDURE — 99497 ADVNCD CARE PLAN 30 MIN: CPT | Performed by: INTERNAL MEDICINE

## 2021-06-16 PROCEDURE — G8752 SYS BP LESS 140: HCPCS | Performed by: INTERNAL MEDICINE

## 2021-06-16 PROCEDURE — 99214 OFFICE O/P EST MOD 30 MIN: CPT | Performed by: INTERNAL MEDICINE

## 2021-06-16 PROCEDURE — G8536 NO DOC ELDER MAL SCRN: HCPCS | Performed by: INTERNAL MEDICINE

## 2021-06-16 PROCEDURE — G8754 DIAS BP LESS 90: HCPCS | Performed by: INTERNAL MEDICINE

## 2021-06-16 PROCEDURE — G8419 CALC BMI OUT NRM PARAM NOF/U: HCPCS | Performed by: INTERNAL MEDICINE

## 2021-06-16 PROCEDURE — G8427 DOCREV CUR MEDS BY ELIG CLIN: HCPCS | Performed by: INTERNAL MEDICINE

## 2021-06-16 PROCEDURE — 3017F COLORECTAL CA SCREEN DOC REV: CPT | Performed by: INTERNAL MEDICINE

## 2021-06-16 PROCEDURE — 1101F PT FALLS ASSESS-DOCD LE1/YR: CPT | Performed by: INTERNAL MEDICINE

## 2021-06-16 PROCEDURE — G0439 PPPS, SUBSEQ VISIT: HCPCS | Performed by: INTERNAL MEDICINE

## 2021-06-16 PROCEDURE — 2022F DILAT RTA XM EVC RTNOPTHY: CPT | Performed by: INTERNAL MEDICINE

## 2021-06-16 PROCEDURE — 3044F HG A1C LEVEL LT 7.0%: CPT | Performed by: INTERNAL MEDICINE

## 2021-06-16 PROCEDURE — G8510 SCR DEP NEG, NO PLAN REQD: HCPCS | Performed by: INTERNAL MEDICINE

## 2021-06-16 NOTE — PATIENT INSTRUCTIONS
Heart-Healthy Diet: Care Instructions  Your Care Instructions     A heart-healthy diet has lots of vegetables, fruits, nuts, beans, and whole grains, and is low in salt. It limits foods that are high in saturated fat, such as meats, cheeses, and fried foods. It may be hard to change your diet, but even small changes can lower your risk of heart attack and heart disease. Follow-up care is a key part of your treatment and safety. Be sure to make and go to all appointments, and call your doctor if you are having problems. It's also a good idea to know your test results and keep a list of the medicines you take. How can you care for yourself at home? Watch your portions  · Learn what a serving is. A \"serving\" and a \"portion\" are not always the same thing. Make sure that you are not eating larger portions than are recommended. For example, a serving of pasta is ½ cup. A serving size of meat is 2 to 3 ounces. A 3-ounce serving is about the size of a deck of cards. Measure serving sizes until you are good at [place]" them. Keep in mind that restaurants often serve portions that are 2 or 3 times the size of one serving. · To keep your energy level up and keep you from feeling hungry, eat often but in smaller portions. · Eat only the number of calories you need to stay at a healthy weight. If you need to lose weight, eat fewer calories than your body burns (through exercise and other physical activity). Eat more fruits and vegetables  · Eat a variety of fruit and vegetables every day. Dark green, deep orange, red, or yellow fruits and vegetables are especially good for you. Examples include spinach, carrots, peaches, and berries. · Keep carrots, celery, and other veggies handy for snacks. Buy fruit that is in season and store it where you can see it so that you will be tempted to eat it. · Cook dishes that have a lot of veggies in them, such as stir-fries and soups.   Limit saturated and trans fat  · Read food labels, and try to avoid saturated and trans fats. They increase your risk of heart disease. · Use olive or canola oil when you cook. · Bake, broil, grill, or steam foods instead of frying them. · Choose lean meats instead of high-fat meats such as hot dogs and sausages. Cut off all visible fat when you prepare meat. · Eat fish, skinless poultry, and meat alternatives such as soy products instead of high-fat meats. Soy products, such as tofu, may be especially good for your heart. · Choose low-fat or fat-free milk and dairy products. Eat foods high in fiber  · Eat a variety of grain products every day. Include whole-grain foods that have lots of fiber and nutrients. Examples of whole-grain foods include oats, whole wheat bread, and brown rice. · Buy whole-grain breads and cereals, instead of white bread or pastries. Limit salt and sodium  · Limit how much salt and sodium you eat to help lower your blood pressure. · Taste food before you salt it. Add only a little salt when you think you need it. With time, your taste buds will adjust to less salt. · Eat fewer snack items, fast foods, and other high-salt, processed foods. Check food labels for the amount of sodium in packaged foods. · Choose low-sodium versions of canned goods (such as soups, vegetables, and beans). Limit sugar  · Limit drinks and foods with added sugar. These include candy, desserts, and soda pop. Limit alcohol  · Limit alcohol to no more than 2 drinks a day for men and 1 drink a day for women. Too much alcohol can cause health problems. When should you call for help? Watch closely for changes in your health, and be sure to contact your doctor if:    · You would like help planning heart-healthy meals. Where can you learn more? Go to http://www.AisleFinder.com/  Enter V137 in the search box to learn more about \"Heart-Healthy Diet: Care Instructions. \"  Current as of: August 22, 2019               Content Version: 12.6  © 1659-7592 [employer]. Care instructions adapted under license by [employer] (which disclaims liability or warranty for this information). If you have questions about a medical condition or this instruction, always ask your healthcare professional. [address] any warranty or liability for your use of this information. Learning About Diabetes Food Guidelines  Your Care Instructions     Meal planning is important to manage diabetes. It helps keep your blood sugar at a target level (which you set with your doctor). You don't have to eat special foods. You can eat what your family eats, including sweets once in a while. But you do have to pay attention to how often you eat and how much you eat of certain foods. You may want to work with a dietitian or a certified diabetes educator (CDE) to help you plan meals and snacks. A dietitian or CDE can also help you lose weight if that is one of your goals. What should you know about eating carbs? Managing the amount of carbohydrate (carbs) you eat is an important part of healthy meals when you have diabetes. Carbohydrate is found in many foods. · Learn which foods have carbs. And learn the amounts of carbs in different foods. ? Bread, cereal, pasta, and rice have about 15 grams of carbs in a serving. A serving is 1 slice of bread (1 ounce), ½ cup of cooked cereal, or 1/3 cup of cooked pasta or rice. ? Fruits have 15 grams of carbs in a serving. A serving is 1 small fresh fruit, such as an apple or orange; ½ of a banana; ½ cup of cooked or canned fruit; ½ cup of fruit juice; 1 cup of melon or raspberries; or 2 tablespoons of dried fruit. ? Milk and no-sugar-added yogurt have 15 grams of carbs in a serving. A serving is 1 cup of milk or 2/3 cup of no-sugar-added yogurt. ? Starchy vegetables have 15 grams of carbs in a serving.  A serving is ½ cup of mashed potatoes or sweet potato; 1 cup winter squash; ½ of a small baked potato; ½ cup of cooked beans; or ½ cup cooked corn or green peas. · Learn how much carbs to eat each day and at each meal. A dietitian or CDE can teach you how to keep track of the amount of carbs you eat. This is called carbohydrate counting. · If you are not sure how to count carbohydrate grams, use the Plate Method to plan meals. It is a good, quick way to make sure that you have a balanced meal. It also helps you spread carbs throughout the day. ? Divide your plate by types of foods. Put non-starchy vegetables on half the plate, meat or other protein food on one-quarter of the plate, and a grain or starchy vegetable in the final quarter of the plate. To this you can add a small piece of fruit and 1 cup of milk or yogurt, depending on how many carbs you are supposed to eat at a meal.  · Try to eat about the same amount of carbs at each meal. Do not \"save up\" your daily allowance of carbs to eat at one meal.  · Proteins have very little or no carbs per serving. Examples of proteins are beef, chicken, turkey, fish, eggs, tofu, cheese, cottage cheese, and peanut butter. A serving size of meat is 3 ounces, which is about the size of a deck of cards. Examples of meat substitute serving sizes (equal to 1 ounce of meat) are 1/4 cup of cottage cheese, 1 egg, 1 tablespoon of peanut butter, and ½ cup of tofu. How can you eat out and still eat healthy? · Learn to estimate the serving sizes of foods that have carbohydrate. If you measure food at home, it will be easier to estimate the amount in a serving of restaurant food. · If the meal you order has too much carbohydrate (such as potatoes, corn, or baked beans), ask to have a low-carbohydrate food instead. Ask for a salad or green vegetables. · If you use insulin, check your blood sugar before and after eating out to help you plan how much to eat in the future.   · If you eat more carbohydrate at a meal than you had planned, take a walk or do other exercise. This will help lower your blood sugar. What else should you know? · Limit saturated fat, such as the fat from meat and dairy products. This is a healthy choice because people who have diabetes are at higher risk of heart disease. So choose lean cuts of meat and nonfat or low-fat dairy products. Use olive or canola oil instead of butter or shortening when cooking. · Don't skip meals. Your blood sugar may drop too low if you skip meals and take insulin or certain medicines for diabetes. · Check with your doctor before you drink alcohol. Alcohol can cause your blood sugar to drop too low. Alcohol can also cause a bad reaction if you take certain diabetes medicines. Follow-up care is a key part of your treatment and safety. Be sure to make and go to all appointments, and call your doctor if you are having problems. It's also a good idea to know your test results and keep a list of the medicines you take. Where can you learn more? Go to http://www.tony.com/  Enter I147 in the search box to learn more about \"Learning About Diabetes Food Guidelines. \"  Current as of: December 20, 2019               Content Version: 12.6  © 9665-0923 [employer]. Care instructions adapted under license by [employer] (which disclaims liability or warranty for this information). If you have questions about a medical condition or this instruction, always ask your healthcare professional. [address] any warranty or liability for your use of this information. Learning About Meal Planning for Diabetes  Why plan your meals? Meal planning can be a key part of managing diabetes. Planning meals and snacks with the right balance of carbohydrate, protein, and fat can help you keep your blood sugar at the target level you set with your doctor. You don't have to eat special foods.  You can eat what your family eats, including sweets once in a while. But you do have to pay attention to how often you eat and how much you eat of certain foods. You may want to work with a dietitian or a certified diabetes educator. He or she can give you tips and meal ideas and can answer your questions about meal planning. This health professional can also help you reach a healthy weight if that is one of your goals. What plan is right for you? Your dietitian or diabetes educator may suggest that you start with the plate format or carbohydrate counting. The plate format  The plate format is a simple way to help you manage how you eat. You plan meals by learning how much space each food should take on a plate. Using the plate format helps you spread carbohydrate throughout the day. It can make it easier to keep your blood sugar level within your target range. It also helps you see if you're eating healthy portion sizes. To use the plate format, you put non-starchy vegetables on half your plate. Add meat or meat substitutes on one-quarter of the plate. Put a grain or starchy vegetable (such as brown rice or a potato) on the final quarter of the plate. You can add a small piece of fruit and some low-fat or fat-free milk or yogurt, depending on your carbohydrate goal for each meal.  Here are some tips for using the plate format:  · Make sure that you are not using an oversized plate. A 9-inch plate is best. Many restaurants use larger plates. · Get used to using the plate format at home. Then you can use it when you eat out. · Write down your questions about using the plate format. Talk to your doctor, a dietitian, or a diabetes educator about your concerns. Carbohydrate counting  With carbohydrate counting, you plan meals based on the amount of carbohydrate in each food. Carbohydrate raises blood sugar higher and more quickly than any other nutrient. It is found in desserts, breads and cereals, and fruit.  It's also found in starchy vegetables such as potatoes and corn, grains such as rice and pasta, and milk and yogurt. Spreading carbohydrate throughout the day helps keep your blood sugar levels within your target range. Your daily amount depends on several things, including your weight, how active you are, which diabetes medicines you take, and what your goals are for your blood sugar levels. A registered dietitian or diabetes educator can help you plan how much carbohydrate to include in each meal and snack. A guideline for your daily amount of carbohydrate is:  · 45 to 60 grams at each meal. That's about the same as 3 to 4 carbohydrate servings. · 15 to 20 grams at each snack. That's about the same as 1 carbohydrate serving. The Nutrition Facts label on packaged foods tells you how much carbohydrate is in a serving of the food. First, look at the serving size on the food label. Is that the amount you eat in a serving? All of the nutrition information on a food label is based on that serving size. So if you eat more or less than that, you'll need to adjust the other numbers. Total carbohydrate is the next thing you need to look for on the label. If you count carbohydrate servings, one serving of carbohydrate is 15 grams. For foods that don't come with labels, such as fresh fruits and vegetables, you'll need a guide that lists carbohydrate in these foods. Ask your doctor, dietitian, or diabetes educator about books or other nutrition guides you can use. If you take insulin, you need to know how many grams of carbohydrate are in a meal. This lets you know how much rapid-acting insulin to take before you eat. If you use an insulin pump, you get a constant rate of insulin during the day. So the pump must be programmed at meals to give you extra insulin to cover the rise in blood sugar after meals. When you know how much carbohydrate you will eat, you can take the right amount of insulin.  Or, if you always use the same amount of insulin, you need to make sure that you eat the same amount of carbohydrate at meals. If you need more help to understand carbohydrate counting and food labels, ask your doctor, dietitian, or diabetes educator. How do you get started with meal planning? Here are some tips to get started:  · Plan your meals a week at a time. Don't forget to include snacks too. · Use cookbooks or online recipes to plan several main meals. Plan some quick meals for busy nights. You also can double some recipes that freeze well. Then you can save half for other busy nights when you don't have time to cook. · Make sure you have the ingredients you need for your recipes. If you're running low on basic items, put these items on your shopping list too. · List foods that you use to make breakfasts, lunches, and snacks. List plenty of fruits and vegetables. · Post this list on the refrigerator. Add to it as you think of more things you need. · Take the list to the store to do your weekly shopping. Follow-up care is a key part of your treatment and safety. Be sure to make and go to all appointments, and call your doctor if you are having problems. It's also a good idea to know your test results and keep a list of the medicines you take. Where can you learn more? Go to http://www.gray.com/  Enter P463 in the search box to learn more about \"Learning About Meal Planning for Diabetes. \"  Current as of: December 20, 2019               Content Version: 12.6  © 3326-0557 [employer]. Care instructions adapted under license by [employer] (which disclaims liability or warranty for this information). If you have questions about a medical condition or this instruction, always ask your healthcare professional. [place] [address] any warranty or liability for your use of this information.     Medicare Wellness Visit, Male    The best way to improve and maintain good health is to have a healthy lifestyle by eating a well-balanced diet, exercising regularly, limiting alcohol and stopping smoking. Regular visits with your physician or non-physician health care provider also support your good health. Preventive screening tests can find health problems before they become diseases or illnesses. Preventive services such as immunizations prevent serious infections. All people over age 72 should have a Pneumovax and a Prevnar-13 shot to prevent potentially life threatening infections with the pneumococcus bacteria, a common cause of pneumonia. These are once in a lifetime unless you and your provider decide differently. All people over 65 should have a yearly influenza vaccine or \"flu\" shot. This does not prevent infection with cold viruses but has been proven to prevent hospitalization and death from influenza. Although Medicare part B \"regular Medicare\" currently only covers tetanus vaccination in the context of an injury, a tetanus vaccine (Tdap or Td) is recommended every 10 years. A shingles vaccine is recommended once in a lifetime after age 61. The Shingles vaccine is also not covered by Medicare part B. Note, however, that both the Shingles vaccine and Tdap/Td are generally covered by secondary carriers. Please check your coverage and out of pocket expenses. Consider contacting your local health department because it may stock these vaccines for a reasonable charge.     We currently have documentation of the following immunization history for you:  Immunization History   Administered Date(s) Administered    COVID-19, PFIZER, MRNA, LNP-S, PF, 30MCG/0.3ML DOSE 03/11/2021, 04/01/2021    Pneumococcal Conjugate (PCV-13) 06/04/2019    Pneumococcal Polysaccharide (PPSV-23) 07/12/2017    TD Vaccine 09/07/2007    Tdap 01/04/2017    Zoster Recombinant 08/23/2020, 10/23/2020    Zoster Vaccine, Live 07/30/2013       Screening for infection with Hepatitis C is recommended for anyone born between 1945 through 1965. The table at the bottom of this document indicates the status of this and other screening services. Screening for diabetes mellitus with a blood sugar test (glucose) should be done at least every 3 years until age 79. You and your health care provider may decide whether to continue screening after age 79. The most recent blood glucose we have on file for you is:   Lab Results   Component Value Date/Time    Glucose 119 (H) 06/09/2021 09:58 AM    Glucose (POC) 104 06/30/2017 09:10 AM       Glaucoma is a disease of the eye due to increased ocular pressure that can lead to blindness. People with risk factors for glaucoma ( race, diabetes, family history) should be screened at least every 2 years by an eye professional. This may be covered annually if indicated as determined by you and your doctor. Cardiovascular screening tests that check for elevated lipids or cholesterol (fatty part of blood) which can lead to heart disease and strokes should be done every 4-6 years through age 79. You and your health care provider may decide whether to continue screening after age 79. The most recent lipid panel we have on file for you is:   Lab Results   Component Value Date/Time    Cholesterol, total 136 06/09/2021 09:58 AM    HDL Cholesterol 42 06/09/2021 09:58 AM    LDL, calculated 81 06/09/2021 09:58 AM    LDL, calculated 72.8 12/01/2020 08:00 AM    VLDL, calculated 13 06/09/2021 09:58 AM    VLDL, calculated 14.2 12/01/2020 08:00 AM    Triglyceride 61 06/09/2021 09:58 AM    CHOL/HDL Ratio 2.8 12/01/2020 08:00 AM       Colorectal cancer screening that evaluates for blood or polyps in your colon for people with average risk should be done yearly as a stool test, every five years as a flexible sigmoidoscope or every 10 years as a colonoscopy up to age 76. You and your health care provider may decide whether to continue screening after age 76.     Men up to age 76 may elect to screen for prostate cancer with a blood test called a PSA at certain intervals, depending on their personal and family history. This decision is between the patient and his provider. The most recent PSA values we have on file for you are:  Lab Results   Component Value Date/Time    Prostate Specific Ag 1.5 12/01/2020 08:00 AM    Prostate Specific Ag 1.2 12/05/2019 09:05 AM    Prostate Specific Ag 0.9 05/30/2019 09:20 AM       If you have been a smoker or had family history of abdominal aortic aneurysms, you and your provider may decide to schedule an ultrasound test of your aorta. Our records show this was done on:  n/a    People who have smoked the equivalent of 1 pack per day for 30 years or more may benefit from screening for lung cancer with a yearly low dose CT scan until they have been non smokers for 15 years or competing health conditions render this unlikely to be beneficial. Our records show: n/a    Your Medicare Wellness Exam is recommended annually.     Here is a list of your current Health Maintenance items with a due date:  Health Maintenance   Topic Date Due    Flu Vaccine (Season Ended) 09/01/2021    Foot Exam Q1  12/08/2021    Colorectal Cancer Screening Combo  03/20/2022    A1C test (Diabetic or Prediabetic)  06/09/2022    MICROALBUMIN Q1  06/09/2022    Lipid Screen  06/09/2022    Medicare Yearly Exam  06/17/2022    Pneumococcal 65+ years (2 of 2 - PPSV23) 07/12/2022    Eye Exam Retinal or Dilated  12/11/2022    DTaP/Tdap/Td series (2 - Td or Tdap) 01/04/2027    Hepatitis C Screening  Completed    AAA Screening 73-67 YO Male Smoking Patients  Completed    Shingrix Vaccine Age 49>  Completed    COVID-19 Vaccine  Completed
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
## 2021-06-16 NOTE — ACP (ADVANCE CARE PLANNING)
Advance Care Planning     Advance Care Planning (ACP) Physician/NP/PA Conversation      Date of Conversation: 6/16/2021  Conducted with: Patient with Decision Making Capacity    Healthcare Decision Maker:     Primary Decision Maker: [address] [place] [address] [phone]  Click here to complete [employer] including selection of the Healthcare Decision Maker Relationship (ie \"Primary\")  Today we documented Decision Maker(s) consistent with Legal Next of Kin hierarchy. Care Preferences:    Hospitalization: \"If your health worsens and it becomes clear that your chance of recovery is unlikely, what would be your preference regarding hospitalization? \"  The patient would prefer hospitalization. Ventilation: \"If you were unable to breathe on your own and your chance of recovery was unlikely, what would be your preference about the use of a ventilator (breathing machine) if it was available to you? \"   The patient would desire the use of a ventilator. Resuscitation: \"In the event your heart stopped as a result of an underlying serious health condition, would you want attempts to be made to restart your heart, or would you prefer a natural death? \"   Yes, attempt to resuscitate.     Additional topics discussed: treatment goals, benefit/burden of treatment options, ventilation preferences, hospitalization preferences, resuscitation preferences and end of life care preferences (vegetative state/imminent death)    Conversation Outcomes / Follow-Up Plan:   ACP in process - information provided, considering goals and options  Reviewed DNR/DNI and patient elects Full Code (Attempt Resuscitation)     Length of Voluntary ACP Conversation in minutes:  16 minutes    Alise Thakkar MD

## 2021-06-18 PROBLEM — I82.511 CHRONIC DEEP VEIN THROMBOSIS (DVT) OF FEMORAL VEIN OF RIGHT LOWER EXTREMITY (HCC): Status: ACTIVE | Noted: 2020-07-24

## 2021-06-18 PROBLEM — E66.3 OVERWEIGHT (BMI 25.0-29.9): Status: ACTIVE | Noted: 2021-06-18

## 2021-06-18 PROBLEM — M25.561 ACUTE PAIN OF RIGHT KNEE: Status: RESOLVED | Noted: 2020-06-04 | Resolved: 2021-06-18
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
## 2021-07-16 DIAGNOSIS — E04.2 MULTIPLE THYROID NODULES: ICD-10-CM

## 2021-07-21 ENCOUNTER — HOSPITAL ENCOUNTER (OUTPATIENT)
Dept: ULTRASOUND IMAGING | Age: 71
Discharge: HOME | End: 2021-07-21
Attending: INTERNAL MEDICINE
Payer: MEDICARE

## 2021-07-21 PROCEDURE — 76536 US EXAM OF HEAD AND NECK: CPT

## 2021-07-23 ENCOUNTER — TELEPHONE (OUTPATIENT)
Dept: INTERNAL MEDICINE CLINIC | Age: 71
End: 2021-07-23

## 2021-07-23 DIAGNOSIS — E04.2 MULTIPLE THYROID NODULES: Primary | ICD-10-CM

## 2021-07-23 DIAGNOSIS — R93.89 ABNORMAL THYROID ULTRASOUND: ICD-10-CM

## 2021-12-03 LAB
25(OH)D3+25(OH)D2 SERPL-MCNC: 42.6 NG/ML (ref 30–100)
ALBUMIN SERPL-MCNC: 4.4 G/DL (ref 3.8–4.8)
ALBUMIN/CREAT UR: 10 MG/G CREAT (ref 0–29)
ALBUMIN/GLOB SERPL: 2.2 {RATIO} (ref 1.2–2.2)
ALP SERPL-CCNC: 81 IU/L (ref 44–121)
ALT SERPL-CCNC: 37 IU/L (ref 0–44)
APPEARANCE UR: CLEAR
AST SERPL-CCNC: 22 IU/L (ref 0–40)
BACTERIA #/AREA URNS HPF: NORMAL /[HPF]
BASOPHILS # BLD AUTO: 0 X10E3/UL (ref 0–0.2)
BASOPHILS NFR BLD AUTO: 1 %
BILIRUB SERPL-MCNC: 0.4 MG/DL (ref 0–1.2)
BILIRUB UR QL STRIP: NEGATIVE
BUN SERPL-MCNC: 14 MG/DL (ref 8–27)
BUN/CREAT SERPL: 15 (ref 10–24)
CALCIUM SERPL-MCNC: 9.1 MG/DL (ref 8.6–10.2)
CASTS URNS QL MICRO: NORMAL /LPF
CHLORIDE SERPL-SCNC: 103 MMOL/L (ref 96–106)
CHOLEST SERPL-MCNC: 156 MG/DL (ref 100–199)
CO2 SERPL-SCNC: 23 MMOL/L (ref 20–29)
COLOR UR: YELLOW
CREAT SERPL-MCNC: 0.96 MG/DL (ref 0.76–1.27)
CREAT UR-MCNC: 174.6 MG/DL
EOSINOPHIL # BLD AUTO: 0.1 X10E3/UL (ref 0–0.4)
EOSINOPHIL NFR BLD AUTO: 2 %
EPI CELLS #/AREA URNS HPF: NORMAL /HPF (ref 0–10)
ERYTHROCYTE [DISTWIDTH] IN BLOOD BY AUTOMATED COUNT: 13.4 % (ref 11.6–15.4)
EST. AVERAGE GLUCOSE BLD GHB EST-MCNC: 148 MG/DL
GLOBULIN SER CALC-MCNC: 2 G/DL (ref 1.5–4.5)
GLUCOSE SERPL-MCNC: 140 MG/DL (ref 65–99)
GLUCOSE UR QL: NEGATIVE
HBA1C MFR BLD: 6.8 % (ref 4.8–5.6)
HCT VFR BLD AUTO: 42.3 % (ref 37.5–51)
HDLC SERPL-MCNC: 51 MG/DL
HGB BLD-MCNC: 13.8 G/DL (ref 13–17.7)
HGB UR QL STRIP: NEGATIVE
IMM GRANULOCYTES # BLD AUTO: 0 X10E3/UL (ref 0–0.1)
IMM GRANULOCYTES NFR BLD AUTO: 0 %
IMP & REVIEW OF LAB RESULTS: NORMAL
KETONES UR QL STRIP: NEGATIVE
LDLC SERPL CALC-MCNC: 93 MG/DL (ref 0–99)
LEUKOCYTE ESTERASE UR QL STRIP: NEGATIVE
LYMPHOCYTES # BLD AUTO: 1.5 X10E3/UL (ref 0.7–3.1)
LYMPHOCYTES NFR BLD AUTO: 38 %
MAGNESIUM SERPL-MCNC: 1.7 MG/DL (ref 1.6–2.3)
MCH RBC QN AUTO: 27.1 PG (ref 26.6–33)
MCHC RBC AUTO-ENTMCNC: 32.6 G/DL (ref 31.5–35.7)
MCV RBC AUTO: 83 FL (ref 79–97)
MICRO URNS: NORMAL
MICRO URNS: NORMAL
MICROALBUMIN UR-MCNC: 17.7 UG/ML
MONOCYTES # BLD AUTO: 0.4 X10E3/UL (ref 0.1–0.9)
MONOCYTES NFR BLD AUTO: 9 %
NEUTROPHILS # BLD AUTO: 2 X10E3/UL (ref 1.4–7)
NEUTROPHILS NFR BLD AUTO: 50 %
NITRITE UR QL STRIP: NEGATIVE
PH UR STRIP: 6 [PH] (ref 5–7.5)
PLATELET # BLD AUTO: 208 X10E3/UL (ref 150–450)
POTASSIUM SERPL-SCNC: 4.4 MMOL/L (ref 3.5–5.2)
PROT SERPL-MCNC: 6.4 G/DL (ref 6–8.5)
PROT UR QL STRIP: NEGATIVE
PSA SERPL-MCNC: 1.7 NG/ML (ref 0–4)
RBC # BLD AUTO: 5.1 X10E6/UL (ref 4.14–5.8)
RBC #/AREA URNS HPF: NORMAL /HPF (ref 0–2)
SODIUM SERPL-SCNC: 142 MMOL/L (ref 134–144)
SP GR UR: 1.02 (ref 1–1.03)
TRIGL SERPL-MCNC: 62 MG/DL (ref 0–149)
TSH SERPL DL<=0.005 MIU/L-ACNC: 1.19 UIU/ML (ref 0.45–4.5)
UROBILINOGEN UR STRIP-MCNC: 0.2 MG/DL (ref 0.2–1)
VLDLC SERPL CALC-MCNC: 12 MG/DL (ref 5–40)
WBC # BLD AUTO: 3.9 X10E3/UL (ref 3.4–10.8)
WBC #/AREA URNS HPF: NORMAL /HPF (ref 0–5)

## 2021-12-08 ENCOUNTER — OFFICE VISIT (OUTPATIENT)
Dept: INTERNAL MEDICINE CLINIC | Age: 71
End: 2021-12-08
Payer: MEDICARE

## 2021-12-08 ENCOUNTER — TELEPHONE (OUTPATIENT)
Dept: INTERNAL MEDICINE CLINIC | Age: 71
End: 2021-12-08

## 2021-12-08 VITALS
WEIGHT: 199 LBS | HEIGHT: 73 IN | HEART RATE: 76 BPM | DIASTOLIC BLOOD PRESSURE: 78 MMHG | SYSTOLIC BLOOD PRESSURE: 132 MMHG | RESPIRATION RATE: 16 BRPM | OXYGEN SATURATION: 97 % | TEMPERATURE: 98.2 F | BODY MASS INDEX: 26.37 KG/M2

## 2021-12-08 DIAGNOSIS — E66.3 OVERWEIGHT (BMI 25.0-29.9): ICD-10-CM

## 2021-12-08 DIAGNOSIS — E11.9 TYPE 2 DIABETES MELLITUS WITHOUT COMPLICATION, WITHOUT LONG-TERM CURRENT USE OF INSULIN (HCC): Primary | ICD-10-CM

## 2021-12-08 DIAGNOSIS — E78.5 HYPERLIPIDEMIA, UNSPECIFIED HYPERLIPIDEMIA TYPE: ICD-10-CM

## 2021-12-08 DIAGNOSIS — I10 ESSENTIAL HYPERTENSION: ICD-10-CM

## 2021-12-08 DIAGNOSIS — E04.2 MULTIPLE THYROID NODULES: ICD-10-CM

## 2021-12-08 DIAGNOSIS — I82.409 RECURRENT DEEP VEIN THROMBOSIS (DVT) (HCC): ICD-10-CM

## 2021-12-08 DIAGNOSIS — I82.511 CHRONIC DEEP VEIN THROMBOSIS (DVT) OF FEMORAL VEIN OF RIGHT LOWER EXTREMITY (HCC): ICD-10-CM

## 2021-12-08 PROCEDURE — 99214 OFFICE O/P EST MOD 30 MIN: CPT | Performed by: INTERNAL MEDICINE

## 2021-12-08 PROCEDURE — 2022F DILAT RTA XM EVC RTNOPTHY: CPT | Performed by: INTERNAL MEDICINE

## 2021-12-08 PROCEDURE — G8752 SYS BP LESS 140: HCPCS | Performed by: INTERNAL MEDICINE

## 2021-12-08 PROCEDURE — 1101F PT FALLS ASSESS-DOCD LE1/YR: CPT | Performed by: INTERNAL MEDICINE

## 2021-12-08 PROCEDURE — 3044F HG A1C LEVEL LT 7.0%: CPT | Performed by: INTERNAL MEDICINE

## 2021-12-08 PROCEDURE — 3017F COLORECTAL CA SCREEN DOC REV: CPT | Performed by: INTERNAL MEDICINE

## 2021-12-08 PROCEDURE — G8419 CALC BMI OUT NRM PARAM NOF/U: HCPCS | Performed by: INTERNAL MEDICINE

## 2021-12-08 PROCEDURE — G8754 DIAS BP LESS 90: HCPCS | Performed by: INTERNAL MEDICINE

## 2021-12-08 PROCEDURE — G0463 HOSPITAL OUTPT CLINIC VISIT: HCPCS | Performed by: INTERNAL MEDICINE

## 2021-12-08 PROCEDURE — G8510 SCR DEP NEG, NO PLAN REQD: HCPCS | Performed by: INTERNAL MEDICINE

## 2021-12-08 PROCEDURE — G8427 DOCREV CUR MEDS BY ELIG CLIN: HCPCS | Performed by: INTERNAL MEDICINE

## 2021-12-08 PROCEDURE — G8536 NO DOC ELDER MAL SCRN: HCPCS | Performed by: INTERNAL MEDICINE

## 2021-12-08 NOTE — PATIENT INSTRUCTIONS
Heart-Healthy Diet: Care Instructions  Your Care Instructions     A heart-healthy diet has lots of vegetables, fruits, nuts, beans, and whole grains, and is low in salt. It limits foods that are high in saturated fat, such as meats, cheeses, and fried foods. It may be hard to change your diet, but even small changes can lower your risk of heart attack and heart disease. Follow-up care is a key part of your treatment and safety. Be sure to make and go to all appointments, and call your doctor if you are having problems. It's also a good idea to know your test results and keep a list of the medicines you take. How can you care for yourself at home? Watch your portions  · Learn what a serving is. A \"serving\" and a \"portion\" are not always the same thing. Make sure that you are not eating larger portions than are recommended. For example, a serving of pasta is ½ cup. A serving size of meat is 2 to 3 ounces. A 3-ounce serving is about the size of a deck of cards. Measure serving sizes until you are good at Bee" them. Keep in mind that restaurants often serve portions that are 2 or 3 times the size of one serving. · To keep your energy level up and keep you from feeling hungry, eat often but in smaller portions. · Eat only the number of calories you need to stay at a healthy weight. If you need to lose weight, eat fewer calories than your body burns (through exercise and other physical activity). Eat more fruits and vegetables  · Eat a variety of fruit and vegetables every day. Dark green, deep orange, red, or yellow fruits and vegetables are especially good for you. Examples include spinach, carrots, peaches, and berries. · Keep carrots, celery, and other veggies handy for snacks. Buy fruit that is in season and store it where you can see it so that you will be tempted to eat it. · Cook dishes that have a lot of veggies in them, such as stir-fries and soups.   Limit saturated and trans fat  · Read food labels, and try to avoid saturated and trans fats. They increase your risk of heart disease. · Use olive or canola oil when you cook. · Bake, broil, grill, or steam foods instead of frying them. · Choose lean meats instead of high-fat meats such as hot dogs and sausages. Cut off all visible fat when you prepare meat. · Eat fish, skinless poultry, and meat alternatives such as soy products instead of high-fat meats. Soy products, such as tofu, may be especially good for your heart. · Choose low-fat or fat-free milk and dairy products. Eat foods high in fiber  · Eat a variety of grain products every day. Include whole-grain foods that have lots of fiber and nutrients. Examples of whole-grain foods include oats, whole wheat bread, and brown rice. · Buy whole-grain breads and cereals, instead of white bread or pastries. Limit salt and sodium  · Limit how much salt and sodium you eat to help lower your blood pressure. · Taste food before you salt it. Add only a little salt when you think you need it. With time, your taste buds will adjust to less salt. · Eat fewer snack items, fast foods, and other high-salt, processed foods. Check food labels for the amount of sodium in packaged foods. · Choose low-sodium versions of canned goods (such as soups, vegetables, and beans). Limit sugar  · Limit drinks and foods with added sugar. These include candy, desserts, and soda pop. Limit alcohol  · Limit alcohol to no more than 2 drinks a day for men and 1 drink a day for women. Too much alcohol can cause health problems. When should you call for help? Watch closely for changes in your health, and be sure to contact your doctor if:    · You would like help planning heart-healthy meals. Where can you learn more? Go to http://www.HiWired.com/  Enter V137 in the search box to learn more about \"Heart-Healthy Diet: Care Instructions. \"  Current as of: August 22, 2019               Content Version: 12.6  © 9088-1602 [employer]. Care instructions adapted under license by [employer] (which disclaims liability or warranty for this information). If you have questions about a medical condition or this instruction, always ask your healthcare professional. [address] any warranty or liability for your use of this information. Learning About Low-Sodium Foods  What foods are low in sodium? The foods you eat contain nutrients, such as vitamins and minerals. Sodium is a nutrient. Your body needs the right amount to stay healthy and work as it should. You can use the list below to help you make choices about which foods to eat. Fruits  · Fresh, frozen, canned, or dried fruit  Vegetables  · Fresh or frozen vegetables, with no added salt  · Canned vegetables, low-sodium or with no added salt  Grains  · Bagels without salted tops  · Cereal with no added salt  · Corn tortillas  · Crackers with no added salt  · Oatmeal, cooked without salt  · Popcorn with no salt  · Pasta and noodles, cooked without salt  · Rice, cooked without salt  · Unsalted pretzels  Dairy and dairy alternatives  · Butter, unsalted  · Cream cheese  · Ice cream  · Milk  · Soy milk  Meats and other protein foods  · Beans and peas, canned with no salt  · Eggs  · Fresh fish (not smoked)  · Fresh meats (not smoked or cured)  · Nuts and nut butter, prepared without salt  · Poultry, not packaged with sodium solution  · Tofu, unseasoned  · Tuna, canned without salt  Seasonings  · Garlic  · Herbs and spices  · Lemon juice  · Mustard  · Olive oil  · Salt-free seasoning mixes  · Vinegar  Work with your doctor to find out how much of this nutrient you need. Depending on your health, you may need more or less of it in your diet. Where can you learn more?   Go to http://www.gray.com/  Enter S460 in the search box to learn more about \"Learning About Low-Sodium Foods.\"  Current as of: August 22, 2019               Content Version: 12.6  © 3067-1096 [employer]. Care instructions adapted under license by [employer] (which disclaims liability or warranty for this information). If you have questions about a medical condition or this instruction, always ask your healthcare professional. [address] any warranty or liability for your use of this information. Low Sodium Diet (2,000 Milligram): Care Instructions  Your Care Instructions     Too much sodium causes your body to hold on to extra water. This can raise your blood pressure and force your heart and kidneys to work harder. In very serious cases, this could cause you to be put in the hospital. It might even be life-threatening. By limiting sodium, you will feel better and lower your risk of serious problems. The most common source of sodium is salt. People get most of the salt in their diet from canned, prepared, and packaged foods. Fast food and restaurant meals also are very high in sodium. Your doctor will probably limit your sodium to less than 2,000 milligrams (mg) a day. This limit counts all the sodium in prepared and packaged foods and any salt you add to your food. Follow-up care is a key part of your treatment and safety. Be sure to make and go to all appointments, and call your doctor if you are having problems. It's also a good idea to know your test results and keep a list of the medicines you take. How can you care for yourself at home? Read food labels  · Read labels on cans and food packages. The labels tell you how much sodium is in each serving. Make sure that you look at the serving size. If you eat more than the serving size, you have eaten more sodium. · Food labels also tell you the Percent Daily Value for sodium. Choose products with low Percent Daily Values for sodium.   · Be aware that sodium can come in forms other than salt, including monosodium glutamate (MSG), sodium citrate, and sodium bicarbonate (baking soda). MSG is often added to Asian food. When you eat out, you can sometimes ask for food without MSG or added salt. Buy low-sodium foods  · Buy foods that are labeled \"unsalted\" (no salt added), \"sodium-free\" (less than 5 mg of sodium per serving), or \"low-sodium\" (less than 140 mg of sodium per serving). Foods labeled \"reduced-sodium\" and \"light sodium\" may still have too much sodium. Be sure to read the label to see how much sodium you are getting. · Buy fresh vegetables, or frozen vegetables without added sauces. Buy low-sodium versions of canned vegetables, soups, and other canned goods. Prepare low-sodium meals  · Cut back on the amount of salt you use in cooking. This will help you adjust to the taste. Do not add salt after cooking. One teaspoon of salt has about 2,300 mg of sodium. · Take the salt shaker off the table. · Flavor your food with garlic, lemon juice, onion, vinegar, herbs, and spices. Do not use soy sauce, lite soy sauce, steak sauce, onion salt, garlic salt, celery salt, mustard, or ketchup on your food. · Use low-sodium salad dressings, sauces, and ketchup. Or make your own salad dressings and sauces without adding salt. · Use less salt (or none) when recipes call for it. You can often use half the salt a recipe calls for without losing flavor. Other foods such as rice, pasta, and grains do not need added salt. · Rinse canned vegetables, and cook them in fresh water. This removes some--but not all--of the salt. · Avoid water that is naturally high in sodium or that has been treated with water softeners, which add sodium. Call your local water company to find out the sodium content of your water supply. If you buy bottled water, read the label and choose a sodium-free brand. Avoid high-sodium foods  · Avoid eating:  ? Smoked, cured, salted, and canned meat, fish, and poultry.   ? Ham, alvarado, hot dogs, and luncheon meats. ? Regular, hard, and processed cheese and regular peanut butter. ? Crackers with salted tops, and other salted snack foods such as pretzels, chips, and salted popcorn. ? Frozen prepared meals, unless labeled low-sodium. ? Canned and dried soups, broths, and bouillon, unless labeled sodium-free or low-sodium. ? Canned vegetables, unless labeled sodium-free or low-sodium. ? Western Sushma fries, pizza, tacos, and other fast foods. ? Pickles, olives, ketchup, and other condiments, especially soy sauce, unless labeled sodium-free or low-sodium. Where can you learn more? Go to http://yanet-sergio.info/  Enter V843 in the search box to learn more about \"Low Sodium Diet (2,000 Milligram): Care Instructions. \"  Current as of: August 22, 2019               Content Version: 12.6  © 8708-9822 [employer]. Care instructions adapted under license by [employer] (which disclaims liability or warranty for this information). If you have questions about a medical condition or this instruction, always ask your healthcare professional. [place] [id number] any warranty or liability for your use of this information. Learning About Diabetes Food Guidelines  Your Care Instructions     Meal planning is important to manage diabetes. It helps keep your blood sugar at a target level (which you set with your doctor). You don't have to eat special foods. You can eat what your family eats, including sweets once in a while. But you do have to pay attention to how often you eat and how much you eat of certain foods. You may want to work with a dietitian or a certified diabetes educator (CDE) to help you plan meals and snacks. A dietitian or CDE can also help you lose weight if that is one of your goals. What should you know about eating carbs? Managing the amount of carbohydrate (carbs) you eat is an important part of healthy meals when you have diabetes. Carbohydrate is found in many foods. · Learn which foods have carbs. And learn the amounts of carbs in different foods. ? Bread, cereal, pasta, and rice have about 15 grams of carbs in a serving. A serving is 1 slice of bread (1 ounce), ½ cup of cooked cereal, or 1/3 cup of cooked pasta or rice. ? Fruits have 15 grams of carbs in a serving. A serving is 1 small fresh fruit, such as an apple or orange; ½ of a banana; ½ cup of cooked or canned fruit; ½ cup of fruit juice; 1 cup of melon or raspberries; or 2 tablespoons of dried fruit. ? Milk and no-sugar-added yogurt have 15 grams of carbs in a serving. A serving is 1 cup of milk or 2/3 cup of no-sugar-added yogurt. ? Starchy vegetables have 15 grams of carbs in a serving. A serving is ½ cup of mashed potatoes or sweet potato; 1 cup winter squash; ½ of a small baked potato; ½ cup of cooked beans; or ½ cup cooked corn or green peas. · Learn how much carbs to eat each day and at each meal. A dietitian or CDE can teach you how to keep track of the amount of carbs you eat. This is called carbohydrate counting. · If you are not sure how to count carbohydrate grams, use the Plate Method to plan meals. It is a good, quick way to make sure that you have a balanced meal. It also helps you spread carbs throughout the day. ? Divide your plate by types of foods. Put non-starchy vegetables on half the plate, meat or other protein food on one-quarter of the plate, and a grain or starchy vegetable in the final quarter of the plate. To this you can add a small piece of fruit and 1 cup of milk or yogurt, depending on how many carbs you are supposed to eat at a meal.  · Try to eat about the same amount of carbs at each meal. Do not \"save up\" your daily allowance of carbs to eat at one meal.  · Proteins have very little or no carbs per serving. Examples of proteins are beef, chicken, turkey, fish, eggs, tofu, cheese, cottage cheese, and peanut butter.  A serving size of meat is 3 ounces, which is about the size of a deck of cards. Examples of meat substitute serving sizes (equal to 1 ounce of meat) are 1/4 cup of cottage cheese, 1 egg, 1 tablespoon of peanut butter, and ½ cup of tofu. How can you eat out and still eat healthy? · Learn to estimate the serving sizes of foods that have carbohydrate. If you measure food at home, it will be easier to estimate the amount in a serving of restaurant food. · If the meal you order has too much carbohydrate (such as potatoes, corn, or baked beans), ask to have a low-carbohydrate food instead. Ask for a salad or green vegetables. · If you use insulin, check your blood sugar before and after eating out to help you plan how much to eat in the future. · If you eat more carbohydrate at a meal than you had planned, take a walk or do other exercise. This will help lower your blood sugar. What else should you know? · Limit saturated fat, such as the fat from meat and dairy products. This is a healthy choice because people who have diabetes are at higher risk of heart disease. So choose lean cuts of meat and nonfat or low-fat dairy products. Use olive or canola oil instead of butter or shortening when cooking. · Don't skip meals. Your blood sugar may drop too low if you skip meals and take insulin or certain medicines for diabetes. · Check with your doctor before you drink alcohol. Alcohol can cause your blood sugar to drop too low. Alcohol can also cause a bad reaction if you take certain diabetes medicines. Follow-up care is a key part of your treatment and safety. Be sure to make and go to all appointments, and call your doctor if you are having problems. It's also a good idea to know your test results and keep a list of the medicines you take. Where can you learn more? Go to http://www.[employer].com/  Enter I147 in the search box to learn more about \"Learning About Diabetes Food Guidelines. \"  Current as of: December 20, 2019 Content Version: 12.6  © 5137-9281 [employer]. Care instructions adapted under license by [employer] (which disclaims liability or warranty for this information). If you have questions about a medical condition or this instruction, always ask your healthcare professional. [address] any warranty or liability for your use of this information.

## 2021-12-08 NOTE — PROGRESS NOTES
1. Have you been to the ER, urgent care clinic or hospitalized since your last visit? NO.     2. Have you seen or consulted any other health care providers outside of the [address] [place] [address] since your last visit (Include any pap smears or colon screening)?  NO
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
## 2022-01-08 RX ORDER — METFORMIN HYDROCHLORIDE 500 MG/1
TABLET ORAL
Qty: 180 TABLET | Refills: 2 | Status: SHIPPED | OUTPATIENT
Start: 2022-01-08 | End: 2022-07-06

## 2022-02-18 RX ORDER — ATORVASTATIN CALCIUM 40 MG/1
TABLET, FILM COATED ORAL
Qty: 90 TABLET | Refills: 2 | Status: SHIPPED | OUTPATIENT
Start: 2022-02-18 | End: 2022-10-28 | Stop reason: SDUPTHER

## 2022-03-18 PROBLEM — K21.9 GERD (GASTROESOPHAGEAL REFLUX DISEASE): Status: ACTIVE | Noted: 2017-01-08

## 2022-03-18 PROBLEM — M50.30 DDD (DEGENERATIVE DISC DISEASE), CERVICAL: Status: ACTIVE | Noted: 2020-07-24

## 2022-03-19 PROBLEM — I82.511 CHRONIC DEEP VEIN THROMBOSIS (DVT) OF FEMORAL VEIN OF RIGHT LOWER EXTREMITY (HCC): Status: ACTIVE | Noted: 2020-07-24
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
## 2022-03-19 PROBLEM — E04.2 MULTIPLE THYROID NODULES: Status: ACTIVE | Noted: 2020-07-24

## 2022-03-19 PROBLEM — E55.9 VITAMIN D INSUFFICIENCY: Status: ACTIVE | Noted: 2017-01-08

## 2022-03-20 PROBLEM — E66.3 OVERWEIGHT (BMI 25.0-29.9): Status: ACTIVE | Noted: 2021-06-18

## 2022-03-24 LAB
ALBUMIN SERPL-MCNC: 4.5 G/DL (ref 3.8–4.8)
ALBUMIN/CREAT UR: 5 MG/G CREAT (ref 0–29)
ALBUMIN/GLOB SERPL: 1.9 {RATIO} (ref 1.2–2.2)
ALP SERPL-CCNC: 83 IU/L (ref 44–121)
ALT SERPL-CCNC: 20 IU/L (ref 0–44)
AST SERPL-CCNC: 20 IU/L (ref 0–40)
BILIRUB SERPL-MCNC: 0.8 MG/DL (ref 0–1.2)
BUN SERPL-MCNC: 22 MG/DL (ref 8–27)
BUN/CREAT SERPL: 20 (ref 10–24)
CALCIUM SERPL-MCNC: 9.6 MG/DL (ref 8.6–10.2)
CHLORIDE SERPL-SCNC: 103 MMOL/L (ref 96–106)
CHOLEST SERPL-MCNC: 146 MG/DL (ref 100–199)
CO2 SERPL-SCNC: 23 MMOL/L (ref 20–29)
CREAT SERPL-MCNC: 1.1 MG/DL (ref 0.76–1.27)
CREAT UR-MCNC: 171.3 MG/DL
EGFR: 73 ML/MIN/1.73
EST. AVERAGE GLUCOSE BLD GHB EST-MCNC: 148 MG/DL
GLOBULIN SER CALC-MCNC: 2.4 G/DL (ref 1.5–4.5)
GLUCOSE SERPL-MCNC: 127 MG/DL (ref 65–99)
HBA1C MFR BLD: 6.8 % (ref 4.8–5.6)
HDLC SERPL-MCNC: 51 MG/DL
IMP & REVIEW OF LAB RESULTS: NORMAL
LDLC SERPL CALC-MCNC: 84 MG/DL (ref 0–99)
MICROALBUMIN UR-MCNC: 8.3 UG/ML
POTASSIUM SERPL-SCNC: 5 MMOL/L (ref 3.5–5.2)
PROT SERPL-MCNC: 6.9 G/DL (ref 6–8.5)
SODIUM SERPL-SCNC: 140 MMOL/L (ref 134–144)
TRIGL SERPL-MCNC: 53 MG/DL (ref 0–149)
VLDLC SERPL CALC-MCNC: 11 MG/DL (ref 5–40)

## 2022-03-30 ENCOUNTER — OFFICE VISIT (OUTPATIENT)
Dept: INTERNAL MEDICINE CLINIC | Age: 72
End: 2022-03-30
Payer: MEDICARE

## 2022-03-30 VITALS
RESPIRATION RATE: 16 BRPM | TEMPERATURE: 97.9 F | HEIGHT: 73 IN | SYSTOLIC BLOOD PRESSURE: 112 MMHG | DIASTOLIC BLOOD PRESSURE: 70 MMHG | WEIGHT: 198 LBS | BODY MASS INDEX: 26.24 KG/M2 | OXYGEN SATURATION: 98 % | HEART RATE: 75 BPM

## 2022-03-30 DIAGNOSIS — E66.3 OVERWEIGHT (BMI 25.0-29.9): ICD-10-CM

## 2022-03-30 DIAGNOSIS — I82.409 RECURRENT DEEP VEIN THROMBOSIS (DVT) (HCC): ICD-10-CM

## 2022-03-30 DIAGNOSIS — E78.5 HYPERLIPIDEMIA, UNSPECIFIED HYPERLIPIDEMIA TYPE: ICD-10-CM

## 2022-03-30 DIAGNOSIS — I82.511 CHRONIC DEEP VEIN THROMBOSIS (DVT) OF FEMORAL VEIN OF RIGHT LOWER EXTREMITY (HCC): ICD-10-CM

## 2022-03-30 DIAGNOSIS — E11.9 TYPE 2 DIABETES MELLITUS WITHOUT COMPLICATION, WITHOUT LONG-TERM CURRENT USE OF INSULIN (HCC): Primary | ICD-10-CM

## 2022-03-30 DIAGNOSIS — E04.2 MULTIPLE THYROID NODULES: ICD-10-CM

## 2022-03-30 DIAGNOSIS — I10 ESSENTIAL HYPERTENSION: ICD-10-CM

## 2022-03-30 PROCEDURE — G8427 DOCREV CUR MEDS BY ELIG CLIN: HCPCS | Performed by: INTERNAL MEDICINE

## 2022-03-30 PROCEDURE — G0463 HOSPITAL OUTPT CLINIC VISIT: HCPCS | Performed by: INTERNAL MEDICINE

## 2022-03-30 PROCEDURE — 99214 OFFICE O/P EST MOD 30 MIN: CPT | Performed by: INTERNAL MEDICINE

## 2022-03-30 PROCEDURE — G8510 SCR DEP NEG, NO PLAN REQD: HCPCS | Performed by: INTERNAL MEDICINE

## 2022-03-30 PROCEDURE — G8536 NO DOC ELDER MAL SCRN: HCPCS | Performed by: INTERNAL MEDICINE

## 2022-03-30 PROCEDURE — 1101F PT FALLS ASSESS-DOCD LE1/YR: CPT | Performed by: INTERNAL MEDICINE

## 2022-03-30 PROCEDURE — 3044F HG A1C LEVEL LT 7.0%: CPT | Performed by: INTERNAL MEDICINE

## 2022-03-30 PROCEDURE — G8752 SYS BP LESS 140: HCPCS | Performed by: INTERNAL MEDICINE

## 2022-03-30 PROCEDURE — G8419 CALC BMI OUT NRM PARAM NOF/U: HCPCS | Performed by: INTERNAL MEDICINE

## 2022-03-30 PROCEDURE — 2022F DILAT RTA XM EVC RTNOPTHY: CPT | Performed by: INTERNAL MEDICINE

## 2022-03-30 PROCEDURE — G8754 DIAS BP LESS 90: HCPCS | Performed by: INTERNAL MEDICINE

## 2022-03-30 PROCEDURE — 3017F COLORECTAL CA SCREEN DOC REV: CPT | Performed by: INTERNAL MEDICINE

## 2022-03-30 NOTE — PATIENT INSTRUCTIONS
Heart-Healthy Diet: Care Instructions  Your Care Instructions     A heart-healthy diet has lots of vegetables, fruits, nuts, beans, and whole grains, and is low in salt. It limits foods that are high in saturated fat, such as meats, cheeses, and fried foods. It may be hard to change your diet, but even small changes can lower your risk of heart attack and heart disease. Follow-up care is a key part of your treatment and safety. Be sure to make and go to all appointments, and call your doctor if you are having problems. It's also a good idea to know your test results and keep a list of the medicines you take. How can you care for yourself at home? Watch your portions  · Learn what a serving is. A \"serving\" and a \"portion\" are not always the same thing. Make sure that you are not eating larger portions than are recommended. For example, a serving of pasta is ½ cup. A serving size of meat is 2 to 3 ounces. A 3-ounce serving is about the size of a deck of cards. Measure serving sizes until you are good at [place]" them. Keep in mind that restaurants often serve portions that are 2 or 3 times the size of one serving. · To keep your energy level up and keep you from feeling hungry, eat often but in smaller portions. · Eat only the number of calories you need to stay at a healthy weight. If you need to lose weight, eat fewer calories than your body burns (through exercise and other physical activity). Eat more fruits and vegetables  · Eat a variety of fruit and vegetables every day. Dark green, deep orange, red, or yellow fruits and vegetables are especially good for you. Examples include spinach, carrots, peaches, and berries. · Keep carrots, celery, and other veggies handy for snacks. Buy fruit that is in season and store it where you can see it so that you will be tempted to eat it. · Cook dishes that have a lot of veggies in them, such as stir-fries and soups.   Limit saturated and trans fat  · Read food labels, and try to avoid saturated and trans fats. They increase your risk of heart disease. · Use olive or canola oil when you cook. · Bake, broil, grill, or steam foods instead of frying them. · Choose lean meats instead of high-fat meats such as hot dogs and sausages. Cut off all visible fat when you prepare meat. · Eat fish, skinless poultry, and meat alternatives such as soy products instead of high-fat meats. Soy products, such as tofu, may be especially good for your heart. · Choose low-fat or fat-free milk and dairy products. Eat foods high in fiber  · Eat a variety of grain products every day. Include whole-grain foods that have lots of fiber and nutrients. Examples of whole-grain foods include oats, whole wheat bread, and brown rice. · Buy whole-grain breads and cereals, instead of white bread or pastries. Limit salt and sodium  · Limit how much salt and sodium you eat to help lower your blood pressure. · Taste food before you salt it. Add only a little salt when you think you need it. With time, your taste buds will adjust to less salt. · Eat fewer snack items, fast foods, and other high-salt, processed foods. Check food labels for the amount of sodium in packaged foods. · Choose low-sodium versions of canned goods (such as soups, vegetables, and beans). Limit sugar  · Limit drinks and foods with added sugar. These include candy, desserts, and soda pop. Limit alcohol  · Limit alcohol to no more than 2 drinks a day for men and 1 drink a day for women. Too much alcohol can cause health problems. When should you call for help? Watch closely for changes in your health, and be sure to contact your doctor if:    · You would like help planning heart-healthy meals. Where can you learn more? Go to http://www.DoubleRecall.com/  Enter V137 in the search box to learn more about \"Heart-Healthy Diet: Care Instructions. \"  Current as of: August 22, 2019               Content Version: 12.6  © 6942-1113 [employer]. Care instructions adapted under license by [employer] (which disclaims liability or warranty for this information). If you have questions about a medical condition or this instruction, always ask your healthcare professional. [address] any warranty or liability for your use of this information. Learning About Diabetes Food Guidelines  Your Care Instructions     Meal planning is important to manage diabetes. It helps keep your blood sugar at a target level (which you set with your doctor). You don't have to eat special foods. You can eat what your family eats, including sweets once in a while. But you do have to pay attention to how often you eat and how much you eat of certain foods. You may want to work with a dietitian or a certified diabetes educator (CDE) to help you plan meals and snacks. A dietitian or CDE can also help you lose weight if that is one of your goals. What should you know about eating carbs? Managing the amount of carbohydrate (carbs) you eat is an important part of healthy meals when you have diabetes. Carbohydrate is found in many foods. · Learn which foods have carbs. And learn the amounts of carbs in different foods. ? Bread, cereal, pasta, and rice have about 15 grams of carbs in a serving. A serving is 1 slice of bread (1 ounce), ½ cup of cooked cereal, or 1/3 cup of cooked pasta or rice. ? Fruits have 15 grams of carbs in a serving. A serving is 1 small fresh fruit, such as an apple or orange; ½ of a banana; ½ cup of cooked or canned fruit; ½ cup of fruit juice; 1 cup of melon or raspberries; or 2 tablespoons of dried fruit. ? Milk and no-sugar-added yogurt have 15 grams of carbs in a serving. A serving is 1 cup of milk or 2/3 cup of no-sugar-added yogurt. ? Starchy vegetables have 15 grams of carbs in a serving.  A serving is ½ cup of mashed potatoes or sweet potato; 1 cup winter squash; ½ of a small baked potato; ½ cup of cooked beans; or ½ cup cooked corn or green peas. · Learn how much carbs to eat each day and at each meal. A dietitian or CDE can teach you how to keep track of the amount of carbs you eat. This is called carbohydrate counting. · If you are not sure how to count carbohydrate grams, use the Plate Method to plan meals. It is a good, quick way to make sure that you have a balanced meal. It also helps you spread carbs throughout the day. ? Divide your plate by types of foods. Put non-starchy vegetables on half the plate, meat or other protein food on one-quarter of the plate, and a grain or starchy vegetable in the final quarter of the plate. To this you can add a small piece of fruit and 1 cup of milk or yogurt, depending on how many carbs you are supposed to eat at a meal.  · Try to eat about the same amount of carbs at each meal. Do not \"save up\" your daily allowance of carbs to eat at one meal.  · Proteins have very little or no carbs per serving. Examples of proteins are beef, chicken, turkey, fish, eggs, tofu, cheese, cottage cheese, and peanut butter. A serving size of meat is 3 ounces, which is about the size of a deck of cards. Examples of meat substitute serving sizes (equal to 1 ounce of meat) are 1/4 cup of cottage cheese, 1 egg, 1 tablespoon of peanut butter, and ½ cup of tofu. How can you eat out and still eat healthy? · Learn to estimate the serving sizes of foods that have carbohydrate. If you measure food at home, it will be easier to estimate the amount in a serving of restaurant food. · If the meal you order has too much carbohydrate (such as potatoes, corn, or baked beans), ask to have a low-carbohydrate food instead. Ask for a salad or green vegetables. · If you use insulin, check your blood sugar before and after eating out to help you plan how much to eat in the future.   · If you eat more carbohydrate at a meal than you had planned, take a walk or do other exercise. This will help lower your blood sugar. What else should you know? · Limit saturated fat, such as the fat from meat and dairy products. This is a healthy choice because people who have diabetes are at higher risk of heart disease. So choose lean cuts of meat and nonfat or low-fat dairy products. Use olive or canola oil instead of butter or shortening when cooking. · Don't skip meals. Your blood sugar may drop too low if you skip meals and take insulin or certain medicines for diabetes. · Check with your doctor before you drink alcohol. Alcohol can cause your blood sugar to drop too low. Alcohol can also cause a bad reaction if you take certain diabetes medicines. Follow-up care is a key part of your treatment and safety. Be sure to make and go to all appointments, and call your doctor if you are having problems. It's also a good idea to know your test results and keep a list of the medicines you take. Where can you learn more? Go to http://www.tony.com/  Enter I147 in the search box to learn more about \"Learning About Diabetes Food Guidelines. \"  Current as of: December 20, 2019               Content Version: 12.6  © 5314-9848 [employer]. Care instructions adapted under license by [employer] (which disclaims liability or warranty for this information). If you have questions about a medical condition or this instruction, always ask your healthcare professional. [address] any warranty or liability for your use of this information. High Cholesterol: Care Instructions  Your Care Instructions     Cholesterol is a type of fat in your blood. It is needed for many body functions, such as making new cells. Cholesterol is made by your body. It also comes from food you eat. High cholesterol means that you have too much of the fat in your blood. This raises your risk of a heart attack and stroke.   LDL and HDL are part of your total cholesterol. LDL is the \"bad\" cholesterol. High LDL can raise your risk for heart disease, heart attack, and stroke. HDL is the \"good\" cholesterol. It helps clear bad cholesterol from the body. High HDL is linked with a lower risk of heart disease, heart attack, and stroke. Your cholesterol levels help your doctor find out your risk for having a heart attack or stroke. You and your doctor can talk about whether you need to lower your risk and what treatment is best for you. A heart-healthy lifestyle along with medicines can help lower your cholesterol and your risk. The way you choose to lower your risk will depend on how high your risk is for heart attack and stroke. It will also depend on how you feel about taking medicines. Follow-up care is a key part of your treatment and safety. Be sure to make and go to all appointments, and call your doctor if you are having problems. It's also a good idea to know your test results and keep a list of the medicines you take. How can you care for yourself at home? · Eat a variety of foods every day. Good choices include fruits, vegetables, whole grains (like oatmeal), dried beans and peas, nuts and seeds, soy products (like tofu), and fat-free or low-fat dairy products. · Replace butter, margarine, and hydrogenated or partially hydrogenated oils with olive and canola oils. (Canola oil margarine without trans fat is fine.)  · Replace red meat with fish, poultry, and soy protein (like tofu). · Limit processed and packaged foods like chips, crackers, and cookies. · Bake, broil, or steam foods. Don't shankar them. · Be physically active. Get at least 30 minutes of exercise on most days of the week. Walking is a good choice. You also may want to do other activities, such as running, swimming, cycling, or playing tennis or team sports. · Stay at a healthy weight or lose weight by making the changes in eating and physical activity listed above.  Losing just a small amount of weight, even 5 to 10 pounds, can reduce your risk for having a heart attack or stroke. · Do not smoke. When should you call for help? Watch closely for changes in your health, and be sure to contact your doctor if:    · You need help making lifestyle changes. · You have questions about your medicine. Where can you learn more? Go to http://www.gray.com/  Enter B886 in the search box to learn more about \"High Cholesterol: Care Instructions. \"  Current as of: December 16, 2019               Content Version: 12.6  © 5150-3889 [employer]. Care instructions adapted under license by [employer] (which disclaims liability or warranty for this information). If you have questions about a medical condition or this instruction, always ask your healthcare professional. [address] any warranty or liability for your use of this information.

## 2022-03-30 NOTE — PROGRESS NOTES
1. \"Have you been to the ER, urgent care clinic since your last visit? Hospitalized since your last visit? \" No    2. \"Have you seen or consulted any other health care providers outside of the [address] [place] [address] since your last visit? \" No     3. For patients aged 39-70: Has the patient had a colonoscopy / FIT/ Cologuard? Yes - no Care Gap present      If the patient is female:    4. For patients aged 41-77: Has the patient had a mammogram within the past 2 years? NA - based on age or sex      11. For patients aged 21-65: Has the patient had a pap smear?  NA - based on age or sex
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
## 2022-06-11 ENCOUNTER — PATIENT MESSAGE (OUTPATIENT)
Dept: INTERNAL MEDICINE CLINIC | Age: 72
End: 2022-06-11

## 2022-06-13 NOTE — TELEPHONE ENCOUNTER
Reviewed evaluation and lab results performed at [facility] ED visit on 6/11/2022. Evidence of worsening renal function with creatinine 1.8/eGFR 40 likely secondary to outlet obstruction. PVR 1000 cc in ED and Matthews catheter placed. CBC normal; urinalysis showed 10-15 RBCs, negative nitrite/LE, negative WBCs and urine culture negative for infection. Discharged with prescription for Flomax. Attempted to call patient to discuss and left message on voicemail to call back. Patient has previously scheduled appointment with me on 7/6/2022. Will let him know that it would be fine to wait until appointment to be seen  unless he feels he needs to be seen sooner since follow-up is needed with urology first.  Will advise that he should obtain labs prior to his visit to reassess his kidney function.

## 2022-06-13 NOTE — TELEPHONE ENCOUNTER
Patient states he went to [facility] ED 6/11/2022 for urinary retention and a catheter was placed. Patient states he was told to follow up in 2 days with either PCP or urology. Patient states he called urology and they do not have an appointment as of yet. Also called urology and  stated their policy is to schedule a visit in 7-10 days because the catheter does not get taken out that soon. Patient is aware of the following appointment:    Caty Salas [phone]  June 17, 2022   At 9:30 am check in at 9:00 am  [address], [place]      Patient wanted to know when he could follow up with Dr. Kiel Gaona. Please advise.

## 2022-06-13 NOTE — TELEPHONE ENCOUNTER
Pt calling to ask Dr Yaritza Reyna to call him back.  Stated he was unavailable when she call earlier today     He can be reached at [phone]

## 2022-06-13 NOTE — TELEPHONE ENCOUNTER
Called and spoke with patient. Discussed as outlined. Encouraged to begin Flomax 0.4 mg nightly as prescribed since will help with urination and upcoming voiding trial.  Given instructions on use and possible side effects. Answered all questions.

## 2022-06-30 LAB
ALBUMIN SERPL-MCNC: 4.1 G/DL (ref 3.8–4.8)
ALBUMIN/CREAT UR: 36 MG/G CREAT (ref 0–29)
ALBUMIN/GLOB SERPL: 1.9 {RATIO} (ref 1.2–2.2)
ALP SERPL-CCNC: 74 IU/L (ref 44–121)
ALT SERPL-CCNC: 41 IU/L (ref 0–44)
AST SERPL-CCNC: 32 IU/L (ref 0–40)
BILIRUB SERPL-MCNC: 0.6 MG/DL (ref 0–1.2)
BUN SERPL-MCNC: 15 MG/DL (ref 8–27)
BUN/CREAT SERPL: 14 (ref 10–24)
CALCIUM SERPL-MCNC: 9.2 MG/DL (ref 8.6–10.2)
CHLORIDE SERPL-SCNC: 105 MMOL/L (ref 96–106)
CHOLEST SERPL-MCNC: 131 MG/DL (ref 100–199)
CO2 SERPL-SCNC: 23 MMOL/L (ref 20–29)
CREAT SERPL-MCNC: 1.1 MG/DL (ref 0.76–1.27)
CREAT UR-MCNC: 179.7 MG/DL
EGFR: 73 ML/MIN/1.73
EST. AVERAGE GLUCOSE BLD GHB EST-MCNC: 151 MG/DL
GLOBULIN SER CALC-MCNC: 2.2 G/DL (ref 1.5–4.5)
GLUCOSE SERPL-MCNC: 147 MG/DL (ref 65–99)
HBA1C MFR BLD: 6.9 % (ref 4.8–5.6)
HDLC SERPL-MCNC: 41 MG/DL
IMP & REVIEW OF LAB RESULTS: NORMAL
LDLC SERPL CALC-MCNC: 79 MG/DL (ref 0–99)
MAGNESIUM SERPL-MCNC: 1.7 MG/DL (ref 1.6–2.3)
MICROALBUMIN UR-MCNC: 63.8 UG/ML
POTASSIUM SERPL-SCNC: 4.9 MMOL/L (ref 3.5–5.2)
PROT SERPL-MCNC: 6.3 G/DL (ref 6–8.5)
SODIUM SERPL-SCNC: 140 MMOL/L (ref 134–144)
TRIGL SERPL-MCNC: 51 MG/DL (ref 0–149)
VLDLC SERPL CALC-MCNC: 11 MG/DL (ref 5–40)

## 2022-07-06 ENCOUNTER — OFFICE VISIT (OUTPATIENT)
Dept: INTERNAL MEDICINE CLINIC | Age: 72
End: 2022-07-06
Payer: MEDICARE

## 2022-07-06 VITALS
BODY MASS INDEX: 24.92 KG/M2 | HEART RATE: 82 BPM | OXYGEN SATURATION: 99 % | DIASTOLIC BLOOD PRESSURE: 68 MMHG | WEIGHT: 188 LBS | SYSTOLIC BLOOD PRESSURE: 118 MMHG | HEIGHT: 73 IN | RESPIRATION RATE: 16 BRPM | TEMPERATURE: 97.3 F

## 2022-07-06 DIAGNOSIS — R80.9 TYPE 2 DIABETES MELLITUS WITH MICROALBUMINURIA, WITHOUT LONG-TERM CURRENT USE OF INSULIN (HCC): Primary | ICD-10-CM

## 2022-07-06 DIAGNOSIS — Z00.00 MEDICARE ANNUAL WELLNESS VISIT, SUBSEQUENT: ICD-10-CM

## 2022-07-06 DIAGNOSIS — Z71.89 ADVANCED DIRECTIVES, COUNSELING/DISCUSSION: ICD-10-CM

## 2022-07-06 DIAGNOSIS — I82.409 RECURRENT DEEP VEIN THROMBOSIS (DVT) (HCC): ICD-10-CM

## 2022-07-06 DIAGNOSIS — E04.2 MULTIPLE THYROID NODULES: ICD-10-CM

## 2022-07-06 DIAGNOSIS — N40.1 URINARY RETENTION DUE TO BENIGN PROSTATIC HYPERPLASIA: ICD-10-CM

## 2022-07-06 DIAGNOSIS — E11.29 TYPE 2 DIABETES MELLITUS WITH MICROALBUMINURIA, WITHOUT LONG-TERM CURRENT USE OF INSULIN (HCC): Primary | ICD-10-CM

## 2022-07-06 DIAGNOSIS — E78.5 HYPERLIPIDEMIA, UNSPECIFIED HYPERLIPIDEMIA TYPE: ICD-10-CM

## 2022-07-06 DIAGNOSIS — I10 ESSENTIAL HYPERTENSION: ICD-10-CM

## 2022-07-06 DIAGNOSIS — R33.8 URINARY RETENTION DUE TO BENIGN PROSTATIC HYPERPLASIA: ICD-10-CM

## 2022-07-06 DIAGNOSIS — Z13.31 SCREENING FOR DEPRESSION: ICD-10-CM

## 2022-07-06 DIAGNOSIS — I82.511 CHRONIC DEEP VEIN THROMBOSIS (DVT) OF FEMORAL VEIN OF RIGHT LOWER EXTREMITY (HCC): ICD-10-CM

## 2022-07-06 PROCEDURE — 99497 ADVNCD CARE PLAN 30 MIN: CPT | Performed by: INTERNAL MEDICINE

## 2022-07-06 PROCEDURE — G8427 DOCREV CUR MEDS BY ELIG CLIN: HCPCS | Performed by: INTERNAL MEDICINE

## 2022-07-06 PROCEDURE — G8536 NO DOC ELDER MAL SCRN: HCPCS | Performed by: INTERNAL MEDICINE

## 2022-07-06 PROCEDURE — 99214 OFFICE O/P EST MOD 30 MIN: CPT | Performed by: INTERNAL MEDICINE

## 2022-07-06 PROCEDURE — 2022F DILAT RTA XM EVC RTNOPTHY: CPT | Performed by: INTERNAL MEDICINE

## 2022-07-06 PROCEDURE — 3044F HG A1C LEVEL LT 7.0%: CPT | Performed by: INTERNAL MEDICINE

## 2022-07-06 PROCEDURE — G8420 CALC BMI NORM PARAMETERS: HCPCS | Performed by: INTERNAL MEDICINE

## 2022-07-06 PROCEDURE — G8510 SCR DEP NEG, NO PLAN REQD: HCPCS | Performed by: INTERNAL MEDICINE

## 2022-07-06 PROCEDURE — G8754 DIAS BP LESS 90: HCPCS | Performed by: INTERNAL MEDICINE

## 2022-07-06 PROCEDURE — 1123F ACP DISCUSS/DSCN MKR DOCD: CPT | Performed by: INTERNAL MEDICINE

## 2022-07-06 PROCEDURE — 3017F COLORECTAL CA SCREEN DOC REV: CPT | Performed by: INTERNAL MEDICINE

## 2022-07-06 PROCEDURE — G0439 PPPS, SUBSEQ VISIT: HCPCS | Performed by: INTERNAL MEDICINE

## 2022-07-06 PROCEDURE — G0463 HOSPITAL OUTPT CLINIC VISIT: HCPCS | Performed by: INTERNAL MEDICINE

## 2022-07-06 PROCEDURE — 1101F PT FALLS ASSESS-DOCD LE1/YR: CPT | Performed by: INTERNAL MEDICINE

## 2022-07-06 PROCEDURE — G8752 SYS BP LESS 140: HCPCS | Performed by: INTERNAL MEDICINE

## 2022-07-06 RX ORDER — METFORMIN HYDROCHLORIDE 500 MG/1
1000 TABLET ORAL 2 TIMES DAILY WITH MEALS
Qty: 360 TABLET | Refills: 2 | Status: SHIPPED | OUTPATIENT
Start: 2022-07-06 | End: 2022-09-21

## 2022-07-06 RX ORDER — METFORMIN HYDROCHLORIDE 500 MG/1
1000 TABLET ORAL 2 TIMES DAILY WITH MEALS
Qty: 360 TABLET | Refills: 2 | Status: SHIPPED | OUTPATIENT
Start: 2022-07-06 | End: 2022-07-06 | Stop reason: SDUPTHER

## 2022-07-06 NOTE — ACP (ADVANCE CARE PLANNING)
Advance Care Planning     Advance Care Planning (ACP) Physician/NP/PA Conversation      Date of Conversation: 7/6/2022  Conducted with: Patient with Decision Making Capacity    Healthcare Decision Maker:     Primary Decision Maker: [address] [facility] - [phone]  Click here to complete [address] including selection of the Healthcare Decision Maker Relationship (ie \"Primary\")        Today we documented Decision Maker(s) consistent with Legal Next of Kin hierarchy. Care Preferences:    Hospitalization: \"If your health worsens and it becomes clear that your chance of recovery is unlikely, what would be your preference regarding hospitalization? \"  The patient would prefer hospitalization. Ventilation: \"If you were unable to breathe on your own and your chance of recovery was unlikely, what would be your preference about the use of a ventilator (breathing machine) if it was available to you? \"   The patient would desire the use of a ventilator. Resuscitation: \"In the event your heart stopped as a result of an underlying serious health condition, would you want attempts to be made to restart your heart, or would you prefer a natural death? \"   Yes, attempt to resuscitate.     Additional topics discussed: treatment goals, benefit/burden of treatment options, ventilation preferences, hospitalization preferences and resuscitation preferences    Conversation Outcomes / Follow-Up Plan:   ACP in process - information provided, considering goals and options  Reviewed DNR/DNI and patient elects Full Code (Attempt Resuscitation)     Length of Voluntary ACP Conversation in minutes:  16 minutes    Tito Stevenson MD

## 2022-07-06 NOTE — PATIENT INSTRUCTIONS
Heart-Healthy Diet: Care Instructions  Your Care Instructions     A heart-healthy diet has lots of vegetables, fruits, nuts, beans, and whole grains, and is low in salt. It limits foods that are high in saturated fat, such as meats, cheeses, and fried foods. It may be hard to change your diet, but even small changes can lower your risk of heart attack and heart disease. Follow-up care is a key part of your treatment and safety. Be sure to make and go to all appointments, and call your doctor if you are having problems. It's also a good idea to know your test results and keep a list of the medicines you take. How can you care for yourself at home? Watch your portions  · Learn what a serving is. A \"serving\" and a \"portion\" are not always the same thing. Make sure that you are not eating larger portions than are recommended. For example, a serving of pasta is ½ cup. A serving size of meat is 2 to 3 ounces. A 3-ounce serving is about the size of a deck of cards. Measure serving sizes until you are good at [place]" them. Keep in mind that restaurants often serve portions that are 2 or 3 times the size of one serving. · To keep your energy level up and keep you from feeling hungry, eat often but in smaller portions. · Eat only the number of calories you need to stay at a healthy weight. If you need to lose weight, eat fewer calories than your body burns (through exercise and other physical activity). Eat more fruits and vegetables  · Eat a variety of fruit and vegetables every day. Dark green, deep orange, red, or yellow fruits and vegetables are especially good for you. Examples include spinach, carrots, peaches, and berries. · Keep carrots, celery, and other veggies handy for snacks. Buy fruit that is in season and store it where you can see it so that you will be tempted to eat it. · Cook dishes that have a lot of veggies in them, such as stir-fries and soups.   Limit saturated and trans fat  · Read food labels, and try to avoid saturated and trans fats. They increase your risk of heart disease. · Use olive or canola oil when you cook. · Bake, broil, grill, or steam foods instead of frying them. · Choose lean meats instead of high-fat meats such as hot dogs and sausages. Cut off all visible fat when you prepare meat. · Eat fish, skinless poultry, and meat alternatives such as soy products instead of high-fat meats. Soy products, such as tofu, may be especially good for your heart. · Choose low-fat or fat-free milk and dairy products. Eat foods high in fiber  · Eat a variety of grain products every day. Include whole-grain foods that have lots of fiber and nutrients. Examples of whole-grain foods include oats, whole wheat bread, and brown rice. · Buy whole-grain breads and cereals, instead of white bread or pastries. Limit salt and sodium  · Limit how much salt and sodium you eat to help lower your blood pressure. · Taste food before you salt it. Add only a little salt when you think you need it. With time, your taste buds will adjust to less salt. · Eat fewer snack items, fast foods, and other high-salt, processed foods. Check food labels for the amount of sodium in packaged foods. · Choose low-sodium versions of canned goods (such as soups, vegetables, and beans). Limit sugar  · Limit drinks and foods with added sugar. These include candy, desserts, and soda pop. Limit alcohol  · Limit alcohol to no more than 2 drinks a day for men and 1 drink a day for women. Too much alcohol can cause health problems. When should you call for help? Watch closely for changes in your health, and be sure to contact your doctor if:    · You would like help planning heart-healthy meals. Where can you learn more? Go to http://www.[employer].com/  Enter V137 in the search box to learn more about \"Heart-Healthy Diet: Care Instructions. \"  Current as of: August 22, 2019               Content Version: 12.6  © 0816-2812 [employer]. Care instructions adapted under license by [employer] (which disclaims liability or warranty for this information). If you have questions about a medical condition or this instruction, always ask your healthcare professional. [address] any warranty or liability for your use of this information. Learning About Diabetes Food Guidelines  Your Care Instructions     Meal planning is important to manage diabetes. It helps keep your blood sugar at a target level (which you set with your doctor). You don't have to eat special foods. You can eat what your family eats, including sweets once in a while. But you do have to pay attention to how often you eat and how much you eat of certain foods. You may want to work with a dietitian or a certified diabetes educator (CDE) to help you plan meals and snacks. A dietitian or CDE can also help you lose weight if that is one of your goals. What should you know about eating carbs? Managing the amount of carbohydrate (carbs) you eat is an important part of healthy meals when you have diabetes. Carbohydrate is found in many foods. · Learn which foods have carbs. And learn the amounts of carbs in different foods. ? Bread, cereal, pasta, and rice have about 15 grams of carbs in a serving. A serving is 1 slice of bread (1 ounce), ½ cup of cooked cereal, or 1/3 cup of cooked pasta or rice. ? Fruits have 15 grams of carbs in a serving. A serving is 1 small fresh fruit, such as an apple or orange; ½ of a banana; ½ cup of cooked or canned fruit; ½ cup of fruit juice; 1 cup of melon or raspberries; or 2 tablespoons of dried fruit. ? Milk and no-sugar-added yogurt have 15 grams of carbs in a serving. A serving is 1 cup of milk or 2/3 cup of no-sugar-added yogurt. ? Starchy vegetables have 15 grams of carbs in a serving.  A serving is ½ cup of mashed potatoes or sweet potato; 1 cup winter squash; ½ of a small baked potato; ½ cup of cooked beans; or ½ cup cooked corn or green peas. · Learn how much carbs to eat each day and at each meal. A dietitian or CDE can teach you how to keep track of the amount of carbs you eat. This is called carbohydrate counting. · If you are not sure how to count carbohydrate grams, use the Plate Method to plan meals. It is a good, quick way to make sure that you have a balanced meal. It also helps you spread carbs throughout the day. ? Divide your plate by types of foods. Put non-starchy vegetables on half the plate, meat or other protein food on one-quarter of the plate, and a grain or starchy vegetable in the final quarter of the plate. To this you can add a small piece of fruit and 1 cup of milk or yogurt, depending on how many carbs you are supposed to eat at a meal.  · Try to eat about the same amount of carbs at each meal. Do not \"save up\" your daily allowance of carbs to eat at one meal.  · Proteins have very little or no carbs per serving. Examples of proteins are beef, chicken, turkey, fish, eggs, tofu, cheese, cottage cheese, and peanut butter. A serving size of meat is 3 ounces, which is about the size of a deck of cards. Examples of meat substitute serving sizes (equal to 1 ounce of meat) are 1/4 cup of cottage cheese, 1 egg, 1 tablespoon of peanut butter, and ½ cup of tofu. How can you eat out and still eat healthy? · Learn to estimate the serving sizes of foods that have carbohydrate. If you measure food at home, it will be easier to estimate the amount in a serving of restaurant food. · If the meal you order has too much carbohydrate (such as potatoes, corn, or baked beans), ask to have a low-carbohydrate food instead. Ask for a salad or green vegetables. · If you use insulin, check your blood sugar before and after eating out to help you plan how much to eat in the future.   · If you eat more carbohydrate at a meal than you had planned, take a walk or do other exercise. This will help lower your blood sugar. What else should you know? · Limit saturated fat, such as the fat from meat and dairy products. This is a healthy choice because people who have diabetes are at higher risk of heart disease. So choose lean cuts of meat and nonfat or low-fat dairy products. Use olive or canola oil instead of butter or shortening when cooking. · Don't skip meals. Your blood sugar may drop too low if you skip meals and take insulin or certain medicines for diabetes. · Check with your doctor before you drink alcohol. Alcohol can cause your blood sugar to drop too low. Alcohol can also cause a bad reaction if you take certain diabetes medicines. Follow-up care is a key part of your treatment and safety. Be sure to make and go to all appointments, and call your doctor if you are having problems. It's also a good idea to know your test results and keep a list of the medicines you take. Where can you learn more? Go to http://www.tony.com/  Enter I147 in the search box to learn more about \"Learning About Diabetes Food Guidelines. \"  Current as of: December 20, 2019               Content Version: 12.6  © 0186-8798 [employer]. Care instructions adapted under license by [employer] (which disclaims liability or warranty for this information). If you have questions about a medical condition or this instruction, always ask your healthcare professional. [address] any warranty or liability for your use of this information. Medicare Wellness Visit, Male    The best way to improve and maintain good health is to have a healthy lifestyle by eating a well-balanced diet, exercising regularly, limiting alcohol and stopping smoking. Regular visits with your physician or non-physician health care provider also support your good health.  Preventive screening tests can find health problems before they become diseases or illnesses. Preventive services such as immunizations prevent serious infections. All people over age 72 should have a Pneumovax and a Prevnar-13 shot to prevent potentially life threatening infections with the pneumococcus bacteria, a common cause of pneumonia. These are once in a lifetime unless you and your provider decide differently. All people over 65 should have a yearly influenza vaccine or \"flu\" shot. This does not prevent infection with cold viruses but has been proven to prevent hospitalization and death from influenza. Although Medicare part B \"regular Medicare\" currently only covers tetanus vaccination in the context of an injury, a tetanus vaccine (Tdap or Td) is recommended every 10 years. A shingles vaccine is recommended once in a lifetime after age 61. The Shingles vaccine is also not covered by Medicare part B. Note, however, that both the Shingles vaccine and Tdap/Td are generally covered by secondary carriers. Please check your coverage and out of pocket expenses. Consider contacting your local health department because it may stock these vaccines for a reasonable charge. We currently have documentation of the following immunization history for you:  Immunization History   Administered Date(s) Administered    COVID-19, PFIZER PURPLE top, DILUTE for use, (age 15 y+), IM, 30mcg/0.3mL 03/11/2021, 04/01/2021, 10/01/2021    Pneumococcal Conjugate (PCV-13) 06/04/2019    Pneumococcal Polysaccharide (PPSV-23) 07/12/2017    TD Vaccine 09/07/2007    Tdap 01/04/2017    Zoster Recombinant 08/23/2020, 10/23/2020    Zoster Vaccine, Live 07/30/2013       Screening for infection with Hepatitis C is recommended for anyone born between [birth year] through [address]. The table at the bottom of this document indicates the status of this and other screening services. Screening for diabetes mellitus with a blood sugar test (glucose) should be done at least every 3 years until age 79. You and your health care provider may decide whether to continue screening after age 79. The most recent blood glucose we have on file for you is:   Lab Results   Component Value Date/Time    Glucose 147 (H) 06/29/2022 10:49 AM    Glucose (POC) 104 06/30/2017 09:10 AM       Glaucoma is a disease of the eye due to increased ocular pressure that can lead to blindness. People with risk factors for glaucoma ( race, diabetes, family history) should be screened at least every 2 years by an eye professional. This may be covered annually if indicated as determined by you and your doctor. Cardiovascular screening tests that check for elevated lipids or cholesterol (fatty part of blood) which can lead to heart disease and strokes should be done every 4-6 years through age 79. You and your health care provider may decide whether to continue screening after age 79. The most recent lipid panel we have on file for you is:   Lab Results   Component Value Date/Time    Cholesterol, total 131 06/29/2022 10:49 AM    HDL Cholesterol 41 06/29/2022 10:49 AM    LDL, calculated 79 06/29/2022 10:49 AM    LDL, calculated 72.8 12/01/2020 08:00 AM    VLDL, calculated 11 06/29/2022 10:49 AM    VLDL, calculated 14.2 12/01/2020 08:00 AM    Triglyceride 51 06/29/2022 10:49 AM    CHOL/HDL Ratio 2.8 12/01/2020 08:00 AM       Colorectal cancer screening that evaluates for blood or polyps in your colon for people with average risk should be done yearly as a stool test, every five years as a flexible sigmoidoscope or every 10 years as a colonoscopy up to age 76. You and your health care provider may decide whether to continue screening after age 76. Men up to age 76 may elect to screen for prostate cancer with a blood test called a PSA at certain intervals, depending on their personal and family history. This decision is between the patient and his provider.  The most recent PSA values we have on file for you are:  Lab Results Component Value Date/Time    Prostate Specific Ag 1.7 12/02/2021 11:36 AM    Prostate Specific Ag 1.5 12/01/2020 08:00 AM    Prostate Specific Ag 1.2 12/05/2019 09:05 AM    Prostate Specific Ag 0.9 05/30/2019 09:20 AM       If you have been a smoker or had family history of abdominal aortic aneurysms, you and your provider may decide to schedule an ultrasound test of your aorta. Our records show this was done on:  n/a    People who have smoked the equivalent of 1 pack per day for 30 years or more may benefit from screening for lung cancer with a yearly low dose CT scan until they have been non smokers for 15 years or competing health conditions render this unlikely to be beneficial. Our records show: n/a    Your Medicare Wellness Exam is recommended annually.     Here is a list of your current Health Maintenance items with a due date:  Health Maintenance   Topic Date Due    Colorectal Cancer Screening Combo  03/20/2022    Pneumococcal 65+ years (3 - PPSV23 or PCV20) 07/12/2022    Flu Vaccine (1) 09/01/2022    Depression Screen  03/30/2023    A1C test (Diabetic or Prediabetic)  06/29/2023    MICROALBUMIN Q1  06/29/2023    Lipid Screen  06/29/2023    Foot Exam Q1  07/06/2023    Medicare Yearly Exam  07/07/2023    Eye Exam Retinal or Dilated  01/13/2024    DTaP/Tdap/Td series (2 - Td or Tdap) 01/04/2027    Hepatitis C Screening  Completed    AAA Screening 73-67 YO Male Smoking Patients  Completed    Shingrix Vaccine Age 50>  Completed    COVID-19 Vaccine  Completed

## 2022-07-06 NOTE — PROGRESS NOTES
This is the Subsequent Medicare Annual Wellness Exam, performed 12 months or more after the Initial AWV or the last Subsequent AWV    I have reviewed the patient's medical history in detail and updated the computerized patient record. Assessment/Plan   Education and counseling provided:  Are appropriate based on today's review and evaluation  End-of-Life planning (with patient's consent)  Influenza Vaccine  Prostate cancer screening tests (PSA, covered annually)  Colorectal cancer screening tests  Cardiovascular screening blood test  Bone mass measurement (DEXA)  Screening for glaucoma  Medical nutrition therapy for individuals with diabetes or renal disease    1. Type 2 diabetes mellitus with microalbuminuria, without long-term current use of insulin (HCC)  -      DIABETES FOOT EXAM  -     HEMOGLOBIN A1C WITH EAG; Future  -     MAGNESIUM; Future  -     METABOLIC PANEL, COMPREHENSIVE; Future  -     MICROALBUMIN, UR, RAND W/ MICROALB/CREAT RATIO; Future  2. Hyperlipidemia, unspecified hyperlipidemia type  -     LIPID PANEL; Future  -     MAGNESIUM; Future  -     METABOLIC PANEL, COMPREHENSIVE; Future  3. Chronic deep vein thrombosis (DVT) of femoral vein of right lower extremity ([facility])  4. Recurrent deep vein thrombosis (DVT) ([facility])  5. Urinary retention due to benign prostatic hyperplasia  -     METABOLIC PANEL, COMPREHENSIVE; Future  6. Essential hypertension  -     MAGNESIUM; Future  -     METABOLIC PANEL, COMPREHENSIVE; Future  7. Multiple thyroid nodules  8. Medicare annual wellness visit, subsequent  -     ADVANCE CARE PLANNING FIRST 30 MINS  9.  Advanced directives, counseling/discussion  -     ADVANCE CARE PLANNING FIRST 27 MINS  10. Screening for depression       Depression Risk Factor Screening     3 most recent PHQ Screens 7/6/2022   Little interest or pleasure in doing things Not at all   Feeling down, depressed, irritable, or hopeless Not at all   Total Score PHQ 2 0       Alcohol & Drug Abuse Risk Screen    Do you average more than 1 drink per night or more than 7 drinks a week: No    In the past three months have you have had more than 4 drinks containing alcohol on one occasion: No          Functional Ability and Level of Safety    Hearing: Hearing is good. Activities of Daily Living: The home contains: no safety equipment. Patient does total self care      Ambulation: with no difficulty     Fall Risk:  Fall Risk Assessment, last 12 mths 7/6/2022   Able to walk? Yes   Fall in past 12 months? 0   Do you feel unsteady? 0   Are you worried about falling 0      Abuse Screen:  Patient is not abused       Cognitive Screening    Has your family/caregiver stated any concerns about your memory: no     Cognitive Screening: none performed today    Health Maintenance Due     Health Maintenance Due   Topic Date Due    Colorectal Cancer Screening Combo  03/20/2022       Patient Care Team   Patient Care Team:  Maricruz Suarez MD as PCP - General (Internal Medicine Physician)  Maricruz Suarez MD as PCP - [facility]  Forbes Severs, MD (Gastroenterology)  Renata Lao MD (Hematology and Oncology)  Mannie Leon MD (Endocrinology Physician)  Keely Harris NP as Nurse Practitioner (Urology)    History     Patient Active Problem List   Diagnosis Code    Hyperlipidemia E78.5    Family history of prostate cancer Z80.42    Colon polyps K63.5    History of gastric ulcer Z87.11    Recurrent deep vein thrombosis (DVT) ([address] I82.409    Type 2 diabetes mellitus, without long-term current use of insulin ([address] E11.9    Essential hypertension I10    GERD (gastroesophageal reflux disease) K21.9    Vitamin D insufficiency E55.9    Chronic deep vein thrombosis (DVT) of femoral vein of right lower extremity (HCC) I82.511    Multiple thyroid nodules E04.2    DDD (degenerative disc disease), cervical M50.30    Overweight (BMI 25.0-29. 9) E66.3    Urinary retention due to benign prostatic hyperplasia N40.1, R33.8     Past Medical History:   Diagnosis Date    Colon polyps     Diabetes mellitus ([address]     FH: prostate cancer     GERD (gastroesophageal reflux disease)     History of DVT (deep vein thrombosis) 2012    Provoked following airplane ride home from [place]. Venous duplex scan showed acute nonocclusive DVT in left popliteal vein; acute thrombosis left gastocnemius and soleus vein.  Hypercholesterolemia     Hypertension     Thromboembolus ([address]     leg  years ago      Past Surgical History:   Procedure Laterality Date    HX GI      colonoscopy x 3    IN EXCISION TUMOR SOFT TISSUE SHOULDER SUBQ 3+CM Right 2017    Dr. Ruchi Claudio     Current Outpatient Medications   Medication Sig Dispense Refill    metFORMIN (GLUCOPHAGE) 500 mg tablet Take 2 Tablets by mouth two (2) times daily (with meals). 360 Tablet 2    dutasteride (AVODART) 0.5 mg capsule Take 1 Capsule by mouth daily (after dinner). 90 Capsule 3    atorvastatin (LIPITOR) 40 mg tablet TAKE 1 TABLET DAILY 90 Tablet 2    Eliquis 5 mg tablet TAKE 1 TABLET TWICE A DAY  FOR BLOOD CLOT IN A DEEP   VEIN OF THE EXTREMITIES 180 Tablet 3    pediatric multivitamin no.76 (FLINTSTONES COMPLETE PO) Take  by mouth.  cyanocobalamin (VITAMIN B-12) 1,000 mcg tablet Take 500 mcg by mouth daily.  cholecalciferol (VITAMIN D3) 1,000 unit cap Take 1,000 Units by mouth daily.  tamsulosin (FLOMAX) 0.4 mg capsule Take 2 Capsules by mouth nightly.  180 Capsule 2     No Known Allergies    Family History   Problem Relation Age of Onset    Diabetes Mother     Cancer Father     Stroke Father     Hypertension Father     Diabetes Brother      Social History     Tobacco Use    Smoking status: Former Smoker     Packs/day: 0.50     Years: 15.00     Pack years: 7.50     Quit date: 1999     Years since quittin.5    Smokeless tobacco: Never Used   Substance Use Topics    Alcohol use: No         Will Thayer MD

## 2022-07-06 NOTE — PROGRESS NOTES
Jose Perez presents today for   Chief Complaint   Patient presents with   [facility] Wellness Visit       1. \"Have you been to the ER, urgent care clinic since your last visit? Hospitalized since your last visit? \" yes   Christine George  6/15/2022 urinary dx    2. \"Have you seen or consulted any other health care providers outside of the [address] [place] [address] since your last visit? \" yes  Dr. Stuart Toscano     3. For patients aged 39-70: Has the patient had a colonoscopy / FIT/ Cologuard? Yes - no Care Gap present      If the patient is female:    4. For patients aged 41-77: Has the patient had a mammogram within the past 2 years? NA - based on age or sex  See top three    5. For patients aged 21-65: Has the patient had a pap smear?  NA - based on age or sex

## 2022-07-10 PROBLEM — R33.8 URINARY RETENTION DUE TO BENIGN PROSTATIC HYPERPLASIA: Status: ACTIVE | Noted: 2022-07-10

## 2022-07-10 PROBLEM — N40.1 URINARY RETENTION DUE TO BENIGN PROSTATIC HYPERPLASIA: Status: ACTIVE | Noted: 2022-07-10
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
## 2022-09-20 PROBLEM — I82.409 DVT (DEEP VENOUS THROMBOSIS) (HCC): Status: ACTIVE | Noted: 2020-07-12

## 2022-09-20 PROBLEM — I82.401 ACUTE DEEP VEIN THROMBOSIS (DVT) OF RIGHT LOWER EXTREMITY (HCC): Status: ACTIVE | Noted: 2020-07-12

## 2022-09-21 RX ORDER — METFORMIN HYDROCHLORIDE 500 MG/1
1000 TABLET ORAL 2 TIMES DAILY WITH MEALS
Qty: 360 TABLET | Refills: 2 | Status: SHIPPED | OUTPATIENT
Start: 2022-09-21

## 2022-09-21 RX ORDER — METFORMIN HYDROCHLORIDE 500 MG/1
TABLET ORAL
Qty: 180 TABLET | Refills: 2 | Status: SHIPPED | OUTPATIENT
Start: 2022-09-21 | End: 2022-09-21 | Stop reason: DRUGHIGH

## 2022-10-04 RX ORDER — APIXABAN 5 MG/1
TABLET, FILM COATED ORAL
Qty: 180 TABLET | Refills: 3 | Status: SHIPPED | OUTPATIENT
Start: 2022-10-04

## 2022-10-13 ENCOUNTER — TELEPHONE (OUTPATIENT)
Dept: INTERNAL MEDICINE CLINIC | Age: 72
End: 2022-10-13

## 2022-10-13 NOTE — TELEPHONE ENCOUNTER
----- Message from Kenney Waller sent at 10/13/2022  3:11 PM EDT -----  Subject: Message to Provider    QUESTIONS  Information for Provider? PT called in and said he needs to have labs done   but wants the order faxed to Omid Natarajan at [place] [address], Address? [place] [address] [place] [address], [address], Phone? [phone]  ---------------------------------------------------------------------------  --------------  Fatou Kendall INFO  [phone]; OK to leave message on voicemail  ---------------------------------------------------------------------------  --------------  SCRIPT ANSWERS  Relationship to Patient?  Self

## 2022-10-15 LAB
ALBUMIN SERPL-MCNC: 4.4 G/DL (ref 3.8–4.8)
ALBUMIN/CREAT UR: 5 MG/G CREAT (ref 0–29)
ALBUMIN/GLOB SERPL: 2 {RATIO} (ref 1.2–2.2)
ALP SERPL-CCNC: 74 IU/L (ref 44–121)
ALT SERPL-CCNC: 17 IU/L (ref 0–44)
AST SERPL-CCNC: 19 IU/L (ref 0–40)
BILIRUB SERPL-MCNC: 0.5 MG/DL (ref 0–1.2)
BUN SERPL-MCNC: 15 MG/DL (ref 8–27)
BUN/CREAT SERPL: 13 (ref 10–24)
CALCIUM SERPL-MCNC: 9.5 MG/DL (ref 8.6–10.2)
CHLORIDE SERPL-SCNC: 104 MMOL/L (ref 96–106)
CHOLEST SERPL-MCNC: 138 MG/DL (ref 100–199)
CO2 SERPL-SCNC: 22 MMOL/L (ref 20–29)
CREAT SERPL-MCNC: 1.13 MG/DL (ref 0.76–1.27)
CREAT UR-MCNC: 116.7 MG/DL
EGFR: 70 ML/MIN/1.73
EST. AVERAGE GLUCOSE BLD GHB EST-MCNC: 143 MG/DL
GLOBULIN SER CALC-MCNC: 2.2 G/DL (ref 1.5–4.5)
GLUCOSE SERPL-MCNC: 120 MG/DL (ref 70–99)
HBA1C MFR BLD: 6.6 % (ref 4.8–5.6)
HDLC SERPL-MCNC: 49 MG/DL
IMP & REVIEW OF LAB RESULTS: NORMAL
LDLC SERPL CALC-MCNC: 76 MG/DL (ref 0–99)
MAGNESIUM SERPL-MCNC: 1.8 MG/DL (ref 1.6–2.3)
MICROALBUMIN UR-MCNC: 6.1 UG/ML
POTASSIUM SERPL-SCNC: 4.5 MMOL/L (ref 3.5–5.2)
PROT SERPL-MCNC: 6.6 G/DL (ref 6–8.5)
SODIUM SERPL-SCNC: 139 MMOL/L (ref 134–144)
TRIGL SERPL-MCNC: 63 MG/DL (ref 0–149)
VLDLC SERPL CALC-MCNC: 13 MG/DL (ref 5–40)

## 2022-10-20 ENCOUNTER — TELEPHONE (OUTPATIENT)
Dept: INTERNAL MEDICINE CLINIC | Age: 72
End: 2022-10-20

## 2022-10-20 ENCOUNTER — OFFICE VISIT (OUTPATIENT)
Dept: INTERNAL MEDICINE CLINIC | Age: 72
End: 2022-10-20
Payer: MEDICARE

## 2022-10-20 VITALS
RESPIRATION RATE: 16 BRPM | HEART RATE: 88 BPM | TEMPERATURE: 97.2 F | BODY MASS INDEX: 24.65 KG/M2 | SYSTOLIC BLOOD PRESSURE: 126 MMHG | HEIGHT: 73 IN | WEIGHT: 186 LBS | OXYGEN SATURATION: 98 % | DIASTOLIC BLOOD PRESSURE: 70 MMHG

## 2022-10-20 DIAGNOSIS — Z12.5 PROSTATE CANCER SCREENING: ICD-10-CM

## 2022-10-20 DIAGNOSIS — E55.9 VITAMIN D INSUFFICIENCY: ICD-10-CM

## 2022-10-20 DIAGNOSIS — N40.1 URINARY RETENTION DUE TO BENIGN PROSTATIC HYPERPLASIA: ICD-10-CM

## 2022-10-20 DIAGNOSIS — I82.409 RECURRENT DEEP VEIN THROMBOSIS (DVT) (HCC): ICD-10-CM

## 2022-10-20 DIAGNOSIS — E11.29 TYPE 2 DIABETES MELLITUS WITH MICROALBUMINURIA, WITHOUT LONG-TERM CURRENT USE OF INSULIN (HCC): Primary | ICD-10-CM

## 2022-10-20 DIAGNOSIS — E04.2 MULTIPLE THYROID NODULES: ICD-10-CM

## 2022-10-20 DIAGNOSIS — R33.8 URINARY RETENTION DUE TO BENIGN PROSTATIC HYPERPLASIA: ICD-10-CM

## 2022-10-20 DIAGNOSIS — Z23 ENCOUNTER FOR IMMUNIZATION: ICD-10-CM

## 2022-10-20 DIAGNOSIS — R80.9 TYPE 2 DIABETES MELLITUS WITH MICROALBUMINURIA, WITHOUT LONG-TERM CURRENT USE OF INSULIN (HCC): Primary | ICD-10-CM

## 2022-10-20 DIAGNOSIS — I82.511 CHRONIC DEEP VEIN THROMBOSIS (DVT) OF FEMORAL VEIN OF RIGHT LOWER EXTREMITY (HCC): ICD-10-CM

## 2022-10-20 DIAGNOSIS — E78.5 HYPERLIPIDEMIA, UNSPECIFIED HYPERLIPIDEMIA TYPE: ICD-10-CM

## 2022-10-20 PROCEDURE — 99214 OFFICE O/P EST MOD 30 MIN: CPT | Performed by: INTERNAL MEDICINE

## 2022-10-20 PROCEDURE — 2022F DILAT RTA XM EVC RTNOPTHY: CPT | Performed by: INTERNAL MEDICINE

## 2022-10-20 PROCEDURE — G8752 SYS BP LESS 140: HCPCS | Performed by: INTERNAL MEDICINE

## 2022-10-20 PROCEDURE — G8536 NO DOC ELDER MAL SCRN: HCPCS | Performed by: INTERNAL MEDICINE

## 2022-10-20 PROCEDURE — 3017F COLORECTAL CA SCREEN DOC REV: CPT | Performed by: INTERNAL MEDICINE

## 2022-10-20 PROCEDURE — G8510 SCR DEP NEG, NO PLAN REQD: HCPCS | Performed by: INTERNAL MEDICINE

## 2022-10-20 PROCEDURE — 90732 PPSV23 VACC 2 YRS+ SUBQ/IM: CPT | Performed by: INTERNAL MEDICINE

## 2022-10-20 PROCEDURE — G8427 DOCREV CUR MEDS BY ELIG CLIN: HCPCS | Performed by: INTERNAL MEDICINE

## 2022-10-20 PROCEDURE — G0463 HOSPITAL OUTPT CLINIC VISIT: HCPCS | Performed by: INTERNAL MEDICINE

## 2022-10-20 PROCEDURE — G8754 DIAS BP LESS 90: HCPCS | Performed by: INTERNAL MEDICINE

## 2022-10-20 PROCEDURE — 3044F HG A1C LEVEL LT 7.0%: CPT | Performed by: INTERNAL MEDICINE

## 2022-10-20 PROCEDURE — 1101F PT FALLS ASSESS-DOCD LE1/YR: CPT | Performed by: INTERNAL MEDICINE

## 2022-10-20 PROCEDURE — G8420 CALC BMI NORM PARAMETERS: HCPCS | Performed by: INTERNAL MEDICINE

## 2022-10-20 PROCEDURE — 1123F ACP DISCUSS/DSCN MKR DOCD: CPT | Performed by: INTERNAL MEDICINE

## 2022-10-20 NOTE — PROGRESS NOTES
1. \"Have you been to the ER, urgent care clinic since your last visit? Hospitalized since your last visit? \" No    2. \"Have you seen or consulted any other health care providers outside of the [address] [place] [address] since your last visit? \" No     3. For patients aged 39-70: Has the patient had a colonoscopy / FIT/ Cologuard? Yes - Care Gap present. Most recent result on file      If the patient is female:    4. For patients aged 41-77: Has the patient had a mammogram within the past 2 years? NA - based on age or sex      11. For patients aged 21-65: Has the patient had a pap smear? NA - based on age or sex      Aislinn Medina 1950 male who presents for routine immunizations. Patient denies any symptoms , reactions or allergies that would exclude them from being immunized today. Risks and adverse reactions were discussed and the VIS was given to them. All questions were addressed. Order placed for P23 by Jeffrey Bhatia. Patient was observed for 10 min post injection. There were no reactions observed.     Gigi Fields, JORDENN

## 2022-10-20 NOTE — PATIENT INSTRUCTIONS
Heart-Healthy Diet: Care Instructions  Your Care Instructions     A heart-healthy diet has lots of vegetables, fruits, nuts, beans, and whole grains, and is low in salt. It limits foods that are high in saturated fat, such as meats, cheeses, and fried foods. It may be hard to change your diet, but even small changes can lower your risk of heart attack and heart disease. Follow-up care is a key part of your treatment and safety. Be sure to make and go to all appointments, and call your doctor if you are having problems. It's also a good idea to know your test results and keep a list of the medicines you take. How can you care for yourself at home? Watch your portions  Learn what a serving is. A \"serving\" and a \"portion\" are not always the same thing. Make sure that you are not eating larger portions than are recommended. For example, a serving of pasta is ½ cup. A serving size of meat is 2 to 3 ounces. A 3-ounce serving is about the size of a deck of cards. Measure serving sizes until you are good at [place]" them. Keep in mind that restaurants often serve portions that are 2 or 3 times the size of one serving. To keep your energy level up and keep you from feeling hungry, eat often but in smaller portions. Eat only the number of calories you need to stay at a healthy weight. If you need to lose weight, eat fewer calories than your body burns (through exercise and other physical activity). Eat more fruits and vegetables  Eat a variety of fruit and vegetables every day. Dark green, deep orange, red, or yellow fruits and vegetables are especially good for you. Examples include spinach, carrots, peaches, and berries. Keep carrots, celery, and other veggies handy for snacks. Buy fruit that is in season and store it where you can see it so that you will be tempted to eat it. Cook dishes that have a lot of veggies in them, such as stir-fries and soups.   Limit saturated and trans fat  Read food labels, and try to avoid saturated and trans fats. They increase your risk of heart disease. Use olive or canola oil when you cook. Bake, broil, grill, or steam foods instead of frying them. Choose lean meats instead of high-fat meats such as hot dogs and sausages. Cut off all visible fat when you prepare meat. Eat fish, skinless poultry, and meat alternatives such as soy products instead of high-fat meats. Soy products, such as tofu, may be especially good for your heart. Choose low-fat or fat-free milk and dairy products. Eat foods high in fiber  Eat a variety of grain products every day. Include whole-grain foods that have lots of fiber and nutrients. Examples of whole-grain foods include oats, whole wheat bread, and brown rice. Buy whole-grain breads and cereals, instead of white bread or pastries. Limit salt and sodium  Limit how much salt and sodium you eat to help lower your blood pressure. Taste food before you salt it. Add only a little salt when you think you need it. With time, your taste buds will adjust to less salt. Eat fewer snack items, fast foods, and other high-salt, processed foods. Check food labels for the amount of sodium in packaged foods. Choose low-sodium versions of canned goods (such as soups, vegetables, and beans). Limit sugar  Limit drinks and foods with added sugar. These include candy, desserts, and soda pop. Limit alcohol  Limit alcohol to no more than 2 drinks a day for men and 1 drink a day for women. Too much alcohol can cause health problems. When should you call for help? Watch closely for changes in your health, and be sure to contact your doctor if:    You would like help planning heart-healthy meals. Where can you learn more? Go to http://www.tony.com/  Enter V137 in the search box to learn more about \"Heart-Healthy Diet: Care Instructions. \"  Current as of: August 22, 2019               Content Version: 12.6  © 9291-3395 [employer]. Care instructions adapted under license by [employer] (which disclaims liability or warranty for this information). If you have questions about a medical condition or this instruction, always ask your healthcare professional. [address] any warranty or liability for your use of this information. Learning About Diabetes Food Guidelines  Your Care Instructions     Meal planning is important to manage diabetes. It helps keep your blood sugar at a target level (which you set with your doctor). You don't have to eat special foods. You can eat what your family eats, including sweets once in a while. But you do have to pay attention to how often you eat and how much you eat of certain foods. You may want to work with a dietitian or a certified diabetes educator (CDE) to help you plan meals and snacks. A dietitian or CDE can also help you lose weight if that is one of your goals. What should you know about eating carbs? Managing the amount of carbohydrate (carbs) you eat is an important part of healthy meals when you have diabetes. Carbohydrate is found in many foods. Learn which foods have carbs. And learn the amounts of carbs in different foods. Bread, cereal, pasta, and rice have about 15 grams of carbs in a serving. A serving is 1 slice of bread (1 ounce), ½ cup of cooked cereal, or 1/3 cup of cooked pasta or rice. Fruits have 15 grams of carbs in a serving. A serving is 1 small fresh fruit, such as an apple or orange; ½ of a banana; ½ cup of cooked or canned fruit; ½ cup of fruit juice; 1 cup of melon or raspberries; or 2 tablespoons of dried fruit. Milk and no-sugar-added yogurt have 15 grams of carbs in a serving. A serving is 1 cup of milk or 2/3 cup of no-sugar-added yogurt. Starchy vegetables have 15 grams of carbs in a serving.  A serving is ½ cup of mashed potatoes or sweet potato; 1 cup winter squash; ½ of a small baked potato; ½ cup of cooked beans; or ½ cup cooked corn or green peas. Learn how much carbs to eat each day and at each meal. A dietitian or CDE can teach you how to keep track of the amount of carbs you eat. This is called carbohydrate counting. If you are not sure how to count carbohydrate grams, use the Plate Method to plan meals. It is a good, quick way to make sure that you have a balanced meal. It also helps you spread carbs throughout the day. Divide your plate by types of foods. Put non-starchy vegetables on half the plate, meat or other protein food on one-quarter of the plate, and a grain or starchy vegetable in the final quarter of the plate. To this you can add a small piece of fruit and 1 cup of milk or yogurt, depending on how many carbs you are supposed to eat at a meal.  Try to eat about the same amount of carbs at each meal. Do not \"save up\" your daily allowance of carbs to eat at one meal.  Proteins have very little or no carbs per serving. Examples of proteins are beef, chicken, turkey, fish, eggs, tofu, cheese, cottage cheese, and peanut butter. A serving size of meat is 3 ounces, which is about the size of a deck of cards. Examples of meat substitute serving sizes (equal to 1 ounce of meat) are 1/4 cup of cottage cheese, 1 egg, 1 tablespoon of peanut butter, and ½ cup of tofu. How can you eat out and still eat healthy? Learn to estimate the serving sizes of foods that have carbohydrate. If you measure food at home, it will be easier to estimate the amount in a serving of restaurant food. If the meal you order has too much carbohydrate (such as potatoes, corn, or baked beans), ask to have a low-carbohydrate food instead. Ask for a salad or green vegetables. If you use insulin, check your blood sugar before and after eating out to help you plan how much to eat in the future. If you eat more carbohydrate at a meal than you had planned, take a walk or do other exercise. This will help lower your blood sugar.   What else should you know? Limit saturated fat, such as the fat from meat and dairy products. This is a healthy choice because people who have diabetes are at higher risk of heart disease. So choose lean cuts of meat and nonfat or low-fat dairy products. Use olive or canola oil instead of butter or shortening when cooking. Don't skip meals. Your blood sugar may drop too low if you skip meals and take insulin or certain medicines for diabetes. Check with your doctor before you drink alcohol. Alcohol can cause your blood sugar to drop too low. Alcohol can also cause a bad reaction if you take certain diabetes medicines. Follow-up care is a key part of your treatment and safety. Be sure to make and go to all appointments, and call your doctor if you are having problems. It's also a good idea to know your test results and keep a list of the medicines you take. Where can you learn more? Go to http://www.tony.com/  Enter I147 in the search box to learn more about \"Learning About Diabetes Food Guidelines. \"  Current as of: December 20, 2019               Content Version: 12.6  © 4935-5042 [employer]. Care instructions adapted under license by [employer] (which disclaims liability or warranty for this information). If you have questions about a medical condition or this instruction, always ask your healthcare professional. [address] any warranty or liability for your use of this information. High Cholesterol: Care Instructions  Your Care Instructions     Cholesterol is a type of fat in your blood. It is needed for many body functions, such as making new cells. Cholesterol is made by your body. It also comes from food you eat. High cholesterol means that you have too much of the fat in your blood. This raises your risk of a heart attack and stroke. LDL and HDL are part of your total cholesterol. LDL is the \"bad\" cholesterol.  High LDL can raise your risk for heart disease, heart attack, and stroke. HDL is the \"good\" cholesterol. It helps clear bad cholesterol from the body. High HDL is linked with a lower risk of heart disease, heart attack, and stroke. Your cholesterol levels help your doctor find out your risk for having a heart attack or stroke. You and your doctor can talk about whether you need to lower your risk and what treatment is best for you. A heart-healthy lifestyle along with medicines can help lower your cholesterol and your risk. The way you choose to lower your risk will depend on how high your risk is for heart attack and stroke. It will also depend on how you feel about taking medicines. Follow-up care is a key part of your treatment and safety. Be sure to make and go to all appointments, and call your doctor if you are having problems. It's also a good idea to know your test results and keep a list of the medicines you take. How can you care for yourself at home? Eat a variety of foods every day. Good choices include fruits, vegetables, whole grains (like oatmeal), dried beans and peas, nuts and seeds, soy products (like tofu), and fat-free or low-fat dairy products. Replace butter, margarine, and hydrogenated or partially hydrogenated oils with olive and canola oils. (Canola oil margarine without trans fat is fine.)  Replace red meat with fish, poultry, and soy protein (like tofu). Limit processed and packaged foods like chips, crackers, and cookies. Bake, broil, or steam foods. Don't shankar them. Be physically active. Get at least 30 minutes of exercise on most days of the week. Walking is a good choice. You also may want to do other activities, such as running, swimming, cycling, or playing tennis or team sports. Stay at a healthy weight or lose weight by making the changes in eating and physical activity listed above.  Losing just a small amount of weight, even 5 to 10 pounds, can reduce your risk for having a heart attack or stroke. Do not smoke. When should you call for help? Watch closely for changes in your health, and be sure to contact your doctor if:    You need help making lifestyle changes. You have questions about your medicine. Where can you learn more? Go to http://www.gray.com/  Enter IPatty in the search box to learn more about \"High Cholesterol: Care Instructions. \"  Current as of: December 16, 2019               Content Version: 12.6  © 3426-5679 [employer]. Care instructions adapted under license by [employer] (which disclaims liability or warranty for this information). If you have questions about a medical condition or this instruction, always ask your healthcare professional. [address] any warranty or liability for your use of this information.

## 2022-10-23 PROBLEM — E66.3 OVERWEIGHT (BMI 25.0-29.9): Status: RESOLVED | Noted: 2021-06-18 | Resolved: 2022-10-23

## 2022-10-23 PROBLEM — I82.409 DVT (DEEP VENOUS THROMBOSIS) (HCC): Status: RESOLVED | Noted: 2020-07-12 | Resolved: 2022-10-23

## 2022-10-23 PROBLEM — I82.401 ACUTE DEEP VEIN THROMBOSIS (DVT) OF RIGHT LOWER EXTREMITY (HCC): Status: RESOLVED | Noted: 2020-07-12 | Resolved: 2022-10-23
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
## 2022-10-25 ENCOUNTER — HOSPITAL ENCOUNTER (OUTPATIENT)
Dept: ULTRASOUND IMAGING | Age: 72
Discharge: HOME | End: 2022-10-25
Attending: INTERNAL MEDICINE
Payer: MEDICARE

## 2022-10-25 PROCEDURE — 76536 US EXAM OF HEAD AND NECK: CPT

## 2022-10-28 RX ORDER — ATORVASTATIN CALCIUM 40 MG/1
40 TABLET, FILM COATED ORAL DAILY
Qty: 90 TABLET | Refills: 0 | Status: SHIPPED | OUTPATIENT
Start: 2022-10-28

## 2022-10-28 NOTE — TELEPHONE ENCOUNTER
Last Visit: 10/20/22 with MD Billy Lewis  Next Appointment: 4/27/23 with MD Lenz  Previous Refill Encounter(s): 2/18/22 #90 with 2 refills    Requested Prescriptions     Pending Prescriptions Disp Refills    atorvastatin (LIPITOR) 40 mg tablet 90 Tablet 3     Sig: Take 1 Tablet by mouth daily. For [address] [facility] in place:   Recommendation Provided To:    Intervention Detail: New Rx: 1, reason: Patient Preference  Gap Closed?:   Intervention Accepted By:   Time Spent (min): 5

## 2022-11-01 ENCOUNTER — TELEPHONE (OUTPATIENT)
Dept: INTERNAL MEDICINE CLINIC | Age: 72
End: 2022-11-01

## 2022-11-01 NOTE — TELEPHONE ENCOUNTER
Pt calling stating ,he has an appt with his endocrinology  Dr James Hernández this afternoon , he is asking for the results from his ultrasound he had done on 10/25 to be sent to the DR Sidhu

## 2022-11-02 NOTE — PROGRESS NOTES
Patient had follow-up with Dr. Domenica Santana at [facility] endocrinology on 11/1/2022 and none of the nodules were felt to meet criteria for biopsy. Recommendation to repeat ultrasound in 1 year given overall stability of low suspicion nodules.

## 2022-11-03 NOTE — TELEPHONE ENCOUNTER
Thyroid ultrasound already reviewed. Already received notes from endocrinology yesterday and patient had appointment with Dr. Jd Pierre on 11/1/2022, and she was able to access thyroid ultrasound report. No reason to fax results at this point. Thanks.

## 2022-11-26 ENCOUNTER — TRANSCRIBE ORDER (OUTPATIENT)
Dept: SCHEDULING | Age: 72
End: 2022-11-26

## 2022-11-26 DIAGNOSIS — E04.2 MULTIPLE THYROID NODULES: Primary | ICD-10-CM

## 2023-01-31 DIAGNOSIS — E04.2 MULTIPLE THYROID NODULES: Primary | ICD-10-CM

## 2023-01-31 RX ORDER — ATORVASTATIN CALCIUM 40 MG/1
40 TABLET, FILM COATED ORAL DAILY
Qty: 90 TABLET | Refills: 3 | Status: SHIPPED | OUTPATIENT
Start: 2023-01-31

## 2023-01-31 NOTE — TELEPHONE ENCOUNTER
PCP: Madyson Rothman MD    Last appt: 10/20/2022  Future Appointments   Date Time Provider Adam Hartman   10/2/2023 10:00 AM [facility] US RM 1 [facility]       Requested Prescriptions     Pending Prescriptions Disp Refills    atorvastatin (LIPITOR) 40 mg tablet 90 Tablet 3     Sig: Take 1 Tablet by mouth daily.

## 2023-02-04 DIAGNOSIS — E11.29 TYPE 2 DIABETES MELLITUS WITH MICROALBUMINURIA, WITHOUT LONG-TERM CURRENT USE OF INSULIN (HCC): Primary | ICD-10-CM

## 2023-02-04 DIAGNOSIS — R80.9 TYPE 2 DIABETES MELLITUS WITH MICROALBUMINURIA, WITHOUT LONG-TERM CURRENT USE OF INSULIN (HCC): Primary | ICD-10-CM

## 2023-02-04 DIAGNOSIS — E78.5 HYPERLIPIDEMIA, UNSPECIFIED HYPERLIPIDEMIA TYPE: ICD-10-CM

## 2023-02-04 DIAGNOSIS — E04.2 MULTIPLE THYROID NODULES: Primary | ICD-10-CM

## 2023-02-05 DIAGNOSIS — E11.29 TYPE 2 DIABETES MELLITUS WITH MICROALBUMINURIA, WITHOUT LONG-TERM CURRENT USE OF INSULIN (HCC): Primary | ICD-10-CM

## 2023-02-05 DIAGNOSIS — E78.5 HYPERLIPIDEMIA, UNSPECIFIED HYPERLIPIDEMIA TYPE: ICD-10-CM

## 2023-02-05 DIAGNOSIS — R80.9 TYPE 2 DIABETES MELLITUS WITH MICROALBUMINURIA, WITHOUT LONG-TERM CURRENT USE OF INSULIN (HCC): Primary | ICD-10-CM

## 2023-02-06 DIAGNOSIS — E11.29 TYPE 2 DIABETES MELLITUS WITH MICROALBUMINURIA, WITHOUT LONG-TERM CURRENT USE OF INSULIN (HCC): Primary | ICD-10-CM

## 2023-02-06 DIAGNOSIS — E78.5 HYPERLIPIDEMIA, UNSPECIFIED HYPERLIPIDEMIA TYPE: ICD-10-CM

## 2023-02-06 DIAGNOSIS — R80.9 TYPE 2 DIABETES MELLITUS WITH MICROALBUMINURIA, WITHOUT LONG-TERM CURRENT USE OF INSULIN (HCC): Primary | ICD-10-CM

## 2023-02-07 DIAGNOSIS — Z12.5 PROSTATE CANCER SCREENING: Primary | ICD-10-CM

## 2023-02-07 DIAGNOSIS — R80.9 TYPE 2 DIABETES MELLITUS WITH MICROALBUMINURIA, WITHOUT LONG-TERM CURRENT USE OF INSULIN (HCC): Primary | ICD-10-CM

## 2023-02-07 DIAGNOSIS — E11.29 TYPE 2 DIABETES MELLITUS WITH MICROALBUMINURIA, WITHOUT LONG-TERM CURRENT USE OF INSULIN (HCC): Primary | ICD-10-CM

## 2023-04-14 LAB
BASOPHILS # BLD AUTO: 0 X10E3/UL (ref 0–0.2)
BASOPHILS NFR BLD AUTO: 1 %
EOSINOPHIL # BLD AUTO: 0.1 X10E3/UL (ref 0–0.4)
EOSINOPHIL NFR BLD AUTO: 1 %
ERYTHROCYTE [DISTWIDTH] IN BLOOD BY AUTOMATED COUNT: 12.6 % (ref 11.6–15.4)
HCT VFR BLD AUTO: 40.9 % (ref 37.5–51)
HGB BLD-MCNC: 13.7 G/DL (ref 13–17.7)
IMM GRANULOCYTES # BLD AUTO: 0 X10E3/UL (ref 0–0.1)
IMM GRANULOCYTES NFR BLD AUTO: 0 %
LYMPHOCYTES # BLD AUTO: 1.7 X10E3/UL (ref 0.7–3.1)
LYMPHOCYTES NFR BLD AUTO: 40 %
MCH RBC QN AUTO: 27.3 PG (ref 26.6–33)
MCHC RBC AUTO-ENTMCNC: 33.5 G/DL (ref 31.5–35.7)
MCV RBC AUTO: 82 FL (ref 79–97)
MONOCYTES # BLD AUTO: 0.4 X10E3/UL (ref 0.1–0.9)
MONOCYTES NFR BLD AUTO: 9 %
NEUTROPHILS # BLD AUTO: 2.1 X10E3/UL (ref 1.4–7)
NEUTROPHILS NFR BLD AUTO: 49 %
PLATELET # BLD AUTO: 207 X10E3/UL (ref 150–450)
RBC # BLD AUTO: 5.01 X10E6/UL (ref 4.14–5.8)
SPECIMEN STATUS REPORT: NORMAL
WBC # BLD AUTO: 4.2 X10E3/UL (ref 3.4–10.8)

## 2023-04-15 LAB
25(OH)D3+25(OH)D2 SERPL-MCNC: 39.7 NG/ML (ref 30–100)
ALBUMIN SERPL-MCNC: 4.6 G/DL (ref 3.8–4.8)
ALBUMIN/GLOB SERPL: 2.1 {RATIO} (ref 1.2–2.2)
ALP SERPL-CCNC: 78 IU/L (ref 44–121)
ALT SERPL-CCNC: 18 IU/L (ref 0–44)
APPEARANCE UR: CLEAR
AST SERPL-CCNC: 20 IU/L (ref 0–40)
BACTERIA #/AREA URNS HPF: NORMAL /[HPF]
BILIRUB SERPL-MCNC: 0.7 MG/DL (ref 0–1.2)
BILIRUB UR QL STRIP: NEGATIVE
BUN SERPL-MCNC: 22 MG/DL (ref 8–27)
BUN/CREAT SERPL: 18 (ref 10–24)
CALCIUM SERPL-MCNC: 9.7 MG/DL (ref 8.6–10.2)
CASTS URNS QL MICRO: NORMAL /LPF
CHLORIDE SERPL-SCNC: 103 MMOL/L (ref 96–106)
CHOLEST SERPL-MCNC: 163 MG/DL (ref 100–199)
CO2 SERPL-SCNC: 27 MMOL/L (ref 20–29)
COLOR UR: YELLOW
CREAT SERPL-MCNC: 1.23 MG/DL (ref 0.76–1.27)
EGFRCR SERPLBLD CKD-EPI 2021: 63 ML/MIN/1.73
EPI CELLS #/AREA URNS HPF: NORMAL /HPF (ref 0–10)
EST. AVERAGE GLUCOSE BLD GHB EST-MCNC: 146 MG/DL
GLOBULIN SER CALC-MCNC: 2.2 G/DL (ref 1.5–4.5)
GLUCOSE SERPL-MCNC: 128 MG/DL (ref 70–99)
GLUCOSE UR QL STRIP: NEGATIVE
HBA1C MFR BLD: 6.7 % (ref 4.8–5.6)
HDLC SERPL-MCNC: 55 MG/DL
HGB UR QL STRIP: NEGATIVE
KETONES UR QL STRIP: NEGATIVE
LDLC SERPL CALC-MCNC: 95 MG/DL (ref 0–99)
LEUKOCYTE ESTERASE UR QL STRIP: NEGATIVE
MAGNESIUM SERPL-MCNC: 1.9 MG/DL (ref 1.6–2.3)
MICRO URNS: NORMAL
MICRO URNS: NORMAL
NITRITE UR QL STRIP: NEGATIVE
PH UR STRIP: 5 [PH] (ref 5–7.5)
POTASSIUM SERPL-SCNC: 4.4 MMOL/L (ref 3.5–5.2)
PROT SERPL-MCNC: 6.8 G/DL (ref 6–8.5)
PROT UR QL STRIP: NEGATIVE
PSA SERPL-MCNC: 0.5 NG/ML (ref 0–4)
RBC #/AREA URNS HPF: NORMAL /HPF (ref 0–2)
SODIUM SERPL-SCNC: 141 MMOL/L (ref 134–144)
SP GR UR STRIP: 1.03 (ref 1–1.03)
TRIGL SERPL-MCNC: 65 MG/DL (ref 0–149)
UROBILINOGEN UR STRIP-MCNC: 0.2 MG/DL (ref 0.2–1)
VLDLC SERPL CALC-MCNC: 13 MG/DL (ref 5–40)
WBC #/AREA URNS HPF: NORMAL /HPF (ref 0–5)

## 2023-04-16 LAB
ALBUMIN/CREAT UR: 5 MG/G CREAT (ref 0–29)
CREAT UR-MCNC: 224.6 MG/DL
IMP & REVIEW OF LAB RESULTS: NORMAL
MICROALBUMIN UR-MCNC: 11 UG/ML

## 2023-04-27 ENCOUNTER — TELEPHONE (OUTPATIENT)
Age: 73
End: 2023-04-27

## 2023-04-27 ENCOUNTER — OFFICE VISIT (OUTPATIENT)
Age: 73
End: 2023-04-27
Payer: MEDICARE

## 2023-04-27 VITALS
RESPIRATION RATE: 18 BRPM | DIASTOLIC BLOOD PRESSURE: 82 MMHG | TEMPERATURE: 97.7 F | OXYGEN SATURATION: 98 % | HEIGHT: 73 IN | BODY MASS INDEX: 25.98 KG/M2 | HEART RATE: 63 BPM | SYSTOLIC BLOOD PRESSURE: 138 MMHG | WEIGHT: 196 LBS

## 2023-04-27 DIAGNOSIS — R36.1 HEMATOSPERMIA: ICD-10-CM

## 2023-04-27 DIAGNOSIS — E04.2 MULTIPLE THYROID NODULES: ICD-10-CM

## 2023-04-27 DIAGNOSIS — I82.511 CHRONIC DEEP VEIN THROMBOSIS (DVT) OF FEMORAL VEIN OF RIGHT LOWER EXTREMITY (HCC): ICD-10-CM

## 2023-04-27 DIAGNOSIS — E55.9 VITAMIN D INSUFFICIENCY: ICD-10-CM

## 2023-04-27 DIAGNOSIS — R33.8 URINARY RETENTION DUE TO BENIGN PROSTATIC HYPERPLASIA: ICD-10-CM

## 2023-04-27 DIAGNOSIS — I10 ESSENTIAL HYPERTENSION: ICD-10-CM

## 2023-04-27 DIAGNOSIS — N40.1 URINARY RETENTION DUE TO BENIGN PROSTATIC HYPERPLASIA: ICD-10-CM

## 2023-04-27 DIAGNOSIS — E11.29 TYPE 2 DIABETES MELLITUS WITH MICROALBUMINURIA, WITHOUT LONG-TERM CURRENT USE OF INSULIN (HCC): Primary | ICD-10-CM

## 2023-04-27 DIAGNOSIS — H61.23 BILATERAL IMPACTED CERUMEN: ICD-10-CM

## 2023-04-27 DIAGNOSIS — R80.9 TYPE 2 DIABETES MELLITUS WITH MICROALBUMINURIA, WITHOUT LONG-TERM CURRENT USE OF INSULIN (HCC): Primary | ICD-10-CM

## 2023-04-27 DIAGNOSIS — E78.00 PURE HYPERCHOLESTEROLEMIA: ICD-10-CM

## 2023-04-27 PROCEDURE — 99214 OFFICE O/P EST MOD 30 MIN: CPT | Performed by: INTERNAL MEDICINE

## 2023-04-27 PROCEDURE — G8427 DOCREV CUR MEDS BY ELIG CLIN: HCPCS | Performed by: INTERNAL MEDICINE

## 2023-04-27 PROCEDURE — 3017F COLORECTAL CA SCREEN DOC REV: CPT | Performed by: INTERNAL MEDICINE

## 2023-04-27 PROCEDURE — 1036F TOBACCO NON-USER: CPT | Performed by: INTERNAL MEDICINE

## 2023-04-27 PROCEDURE — 99211 OFF/OP EST MAY X REQ PHY/QHP: CPT | Performed by: INTERNAL MEDICINE

## 2023-04-27 PROCEDURE — G8417 CALC BMI ABV UP PARAM F/U: HCPCS | Performed by: INTERNAL MEDICINE

## 2023-04-27 PROCEDURE — 1123F ACP DISCUSS/DSCN MKR DOCD: CPT | Performed by: INTERNAL MEDICINE

## 2023-04-27 PROCEDURE — 2022F DILAT RTA XM EVC RTNOPTHY: CPT | Performed by: INTERNAL MEDICINE

## 2023-04-27 PROCEDURE — 3074F SYST BP LT 130 MM HG: CPT | Performed by: INTERNAL MEDICINE

## 2023-04-27 PROCEDURE — 3078F DIAST BP <80 MM HG: CPT | Performed by: INTERNAL MEDICINE

## 2023-04-27 PROCEDURE — 3044F HG A1C LEVEL LT 7.0%: CPT | Performed by: INTERNAL MEDICINE

## 2023-04-27 RX ORDER — LISINOPRIL 5 MG/1
5 TABLET ORAL DAILY
Qty: 90 TABLET | Refills: 3 | Status: SHIPPED | OUTPATIENT
Start: 2023-04-27

## 2023-04-27 SDOH — ECONOMIC STABILITY: FOOD INSECURITY: WITHIN THE PAST 12 MONTHS, YOU WORRIED THAT YOUR FOOD WOULD RUN OUT BEFORE YOU GOT MONEY TO BUY MORE.: NEVER TRUE

## 2023-04-27 SDOH — ECONOMIC STABILITY: INCOME INSECURITY: HOW HARD IS IT FOR YOU TO PAY FOR THE VERY BASICS LIKE FOOD, HOUSING, MEDICAL CARE, AND HEATING?: NOT HARD AT ALL

## 2023-04-27 SDOH — ECONOMIC STABILITY: HOUSING INSECURITY
IN THE LAST 12 MONTHS, WAS THERE A TIME WHEN YOU DID NOT HAVE A STEADY PLACE TO SLEEP OR SLEPT IN A SHELTER (INCLUDING NOW)?: NO

## 2023-04-27 SDOH — ECONOMIC STABILITY: FOOD INSECURITY: WITHIN THE PAST 12 MONTHS, THE FOOD YOU BOUGHT JUST DIDN'T LAST AND YOU DIDN'T HAVE MONEY TO GET MORE.: NEVER TRUE

## 2023-04-27 ASSESSMENT — PATIENT HEALTH QUESTIONNAIRE - PHQ9
2. FEELING DOWN, DEPRESSED OR HOPELESS: 0
SUM OF ALL RESPONSES TO PHQ QUESTIONS 1-9: 0
SUM OF ALL RESPONSES TO PHQ QUESTIONS 1-9: 0
SUM OF ALL RESPONSES TO PHQ9 QUESTIONS 1 & 2: 0
SUM OF ALL RESPONSES TO PHQ QUESTIONS 1-9: 0
SUM OF ALL RESPONSES TO PHQ QUESTIONS 1-9: 0
1. LITTLE INTEREST OR PLEASURE IN DOING THINGS: 0

## 2023-04-27 NOTE — PATIENT INSTRUCTIONS
sugar before and after eating out to help you plan how much to eat in the future. If you eat more carbohydrate at a meal than you had planned, take a walk or do other exercise. This will help lower your blood sugar. What else should you know? Limit saturated fat, such as the fat from meat and dairy products. This is a healthy choice because people who have diabetes are at higher risk of heart disease. So choose lean cuts of meat and nonfat or low-fat dairy products. Use olive or canola oil instead of butter or shortening when cooking. Don't skip meals. Your blood sugar may drop too low if you skip meals and take insulin or certain medicines for diabetes. Check with your doctor before you drink alcohol. Alcohol can cause your blood sugar to drop too low. Alcohol can also cause a bad reaction if you take certain diabetes medicines. Follow-up care is a key part of your treatment and safety. Be sure to make and go to all appointments, and call your doctor if you are having problems. It's also a good idea to know your test results and keep a list of the medicines you take. Where can you learn more? Go to http://www.aceves.com/  Enter I147 in the search box to learn more about \"Learning About Diabetes Food Guidelines. \"  Current as of: December 20, 2019               Content Version: 12.6  © 5932-9020 [employer]. Care instructions adapted under license by [employer] (which disclaims liability or warranty for this information). If you have questions about a medical condition or this instruction, always ask your healthcare professional. [place] [address] any warranty or liability for your use of this information. Learning About Low-Sodium Foods  What foods are low in sodium? The foods you eat contain nutrients, such as vitamins and minerals. Sodium is a nutrient. Your body needs the right amount to stay healthy and work as it should.  You can

## 2023-04-27 NOTE — PROGRESS NOTES
Mary Anne Bennett presents today for   Chief Complaint   Patient presents with    Follow-up    Diabetes     Type 2     Hyperlipidemia    Goiter     Nontoxic goiter    Discuss Labs     4-14-23     1. \"Have you been to the ER, urgent care clinic since your last visit? Hospitalized since your last visit? \" no    2. \"Have you seen or consulted any other health care providers outside of the [address] [place] [address] since your last visit? \" no     3. For patients aged 39-70: Has the patient had a colonoscopy / FIT/ Cologuard? Yes - no Care Gap present      If the patient is female:    4. For patients aged 41-77: Has the patient had a mammogram within the past 2 years? NA - based on age or sex      11. For patients aged 21-65: Has the patient had a pap smear?  NA - based on age or sex
approximately 1000 cc of urine. No evidence of urine infection was found although acute kidney injury noted with creatinine increased to 1.8/eGFR 39.7. He return to the ED on 6/15/2022 since urinary retention recurred, and was a function of the leg bag being placed too high while sleeping causing stasis. Renal function had normalized with creatinine 1.1/eGFR >60. Wilkinson catheter was replaced and urinalysis showed loaded WBCs/100-150 RBCs with large LE and occult blood and urine culture showed > 100,000 colonies Citrobacter koseri (pansensitive). He was treated with Keflex, but had follow-up with FRANCK Jaeger on 6/17/2022 and was changed to Bactrim for 10 days. He was treated with Flomax and Avodart but failed voiding trial on 7/5/2022 with  cc. Dose of Flomax increased to 0.8 mg daily and Wilkinson catheter was replaced. He reports that he had a successful voiding trial on 7/25/2022 and Wilkinson catheter was discontinued. He underwent a cystoscopy by Dr. Lino Reyes on 9/20/2022 which showed lateral lobe prostatic hypertrophy, active urethral cystitis, and prostatic volume of 34.99 g. He states that he was unable to obtain refill of Flomax since did not arrive from mail order so did not take it for approximately 1 week. He presented for follow-up with [facility] and Wilkinson catheter was replaced given PVR of 832 cc. He was restarted on Flomax and Avodart and Wilkinson catheter was removed on 10/26/2022. In 6/2020, he noted right knee pain and swelling after walking which had been present for several weeks. He stated that although initially very painful, it had been gradually improving. He denied any known injury, fever, chills, recent travel, chest pain or shortness of breath. On 7/11/2020, he presented to [facility] ED due to worsening right leg pain and swelling, He reported that he had started wearing a knee brace one week prior for his pain and subsequently noted the onset of increased swelling.

## 2023-04-30 PROBLEM — R36.1 HEMATOSPERMIA: Status: ACTIVE | Noted: 2023-04-30

## 2023-06-02 LAB
BUN SERPL-MCNC: 17 MG/DL (ref 8–27)
BUN/CREAT SERPL: 16 (ref 10–24)
CALCIUM SERPL-MCNC: 9.3 MG/DL (ref 8.6–10.2)
CHLORIDE SERPL-SCNC: 107 MMOL/L (ref 96–106)
CO2 SERPL-SCNC: 23 MMOL/L (ref 20–29)
CREAT SERPL-MCNC: 1.09 MG/DL (ref 0.76–1.27)
EGFRCR SERPLBLD CKD-EPI 2021: 73 ML/MIN/1.73
GLUCOSE SERPL-MCNC: 99 MG/DL (ref 70–99)
POTASSIUM SERPL-SCNC: 3.9 MMOL/L (ref 3.5–5.2)
SODIUM SERPL-SCNC: 142 MMOL/L (ref 134–144)
SPECIMEN STATUS REPORT: NORMAL

## 2023-06-08 ENCOUNTER — OFFICE VISIT (OUTPATIENT)
Age: 73
End: 2023-06-08
Payer: MEDICARE

## 2023-06-08 VITALS
OXYGEN SATURATION: 97 % | SYSTOLIC BLOOD PRESSURE: 114 MMHG | BODY MASS INDEX: 25.58 KG/M2 | HEIGHT: 73 IN | RESPIRATION RATE: 16 BRPM | DIASTOLIC BLOOD PRESSURE: 70 MMHG | WEIGHT: 193 LBS | TEMPERATURE: 97.1 F | HEART RATE: 63 BPM

## 2023-06-08 DIAGNOSIS — R80.9 TYPE 2 DIABETES MELLITUS WITH MICROALBUMINURIA, WITHOUT LONG-TERM CURRENT USE OF INSULIN (HCC): Primary | ICD-10-CM

## 2023-06-08 DIAGNOSIS — I82.409 RECURRENT DEEP VEIN THROMBOSIS (DVT) (HCC): ICD-10-CM

## 2023-06-08 DIAGNOSIS — I10 ESSENTIAL HYPERTENSION: ICD-10-CM

## 2023-06-08 DIAGNOSIS — R33.8 URINARY RETENTION DUE TO BENIGN PROSTATIC HYPERPLASIA: ICD-10-CM

## 2023-06-08 DIAGNOSIS — E66.3 OVERWEIGHT (BMI 25.0-29.9): ICD-10-CM

## 2023-06-08 DIAGNOSIS — E11.29 TYPE 2 DIABETES MELLITUS WITH MICROALBUMINURIA, WITHOUT LONG-TERM CURRENT USE OF INSULIN (HCC): Primary | ICD-10-CM

## 2023-06-08 DIAGNOSIS — E78.00 PURE HYPERCHOLESTEROLEMIA: ICD-10-CM

## 2023-06-08 DIAGNOSIS — N40.1 URINARY RETENTION DUE TO BENIGN PROSTATIC HYPERPLASIA: ICD-10-CM

## 2023-06-08 DIAGNOSIS — E04.2 MULTIPLE THYROID NODULES: ICD-10-CM

## 2023-06-08 PROCEDURE — 99211 OFF/OP EST MAY X REQ PHY/QHP: CPT | Performed by: INTERNAL MEDICINE

## 2023-06-08 PROCEDURE — G8417 CALC BMI ABV UP PARAM F/U: HCPCS | Performed by: INTERNAL MEDICINE

## 2023-06-08 PROCEDURE — G8427 DOCREV CUR MEDS BY ELIG CLIN: HCPCS | Performed by: INTERNAL MEDICINE

## 2023-06-08 PROCEDURE — 1036F TOBACCO NON-USER: CPT | Performed by: INTERNAL MEDICINE

## 2023-06-08 PROCEDURE — 3017F COLORECTAL CA SCREEN DOC REV: CPT | Performed by: INTERNAL MEDICINE

## 2023-06-08 PROCEDURE — 1123F ACP DISCUSS/DSCN MKR DOCD: CPT | Performed by: INTERNAL MEDICINE

## 2023-06-08 PROCEDURE — 3074F SYST BP LT 130 MM HG: CPT | Performed by: INTERNAL MEDICINE

## 2023-06-08 PROCEDURE — 3078F DIAST BP <80 MM HG: CPT | Performed by: INTERNAL MEDICINE

## 2023-06-08 PROCEDURE — 3044F HG A1C LEVEL LT 7.0%: CPT | Performed by: INTERNAL MEDICINE

## 2023-06-08 PROCEDURE — 2022F DILAT RTA XM EVC RTNOPTHY: CPT | Performed by: INTERNAL MEDICINE

## 2023-06-08 ASSESSMENT — PATIENT HEALTH QUESTIONNAIRE - PHQ9
SUM OF ALL RESPONSES TO PHQ QUESTIONS 1-9: 0
1. LITTLE INTEREST OR PLEASURE IN DOING THINGS: 0
2. FEELING DOWN, DEPRESSED OR HOPELESS: 0
SUM OF ALL RESPONSES TO PHQ9 QUESTIONS 1 & 2: 0

## 2023-06-08 NOTE — PATIENT INSTRUCTIONS
stroke. Do not smoke. When should you call for help? Watch closely for changes in your health, and be sure to contact your doctor if:    You need help making lifestyle changes. You have questions about your medicine. Where can you learn more? Go to http://www.gray.com/  Enter O391 in the search box to learn more about \"High Cholesterol: Care Instructions. \"  Current as of: December 16, 2019               Content Version: 12.6  © 4381-0445 [employer]. Care instructions adapted under license by [employer] (which disclaims liability or warranty for this information). If you have questions about a medical condition or this instruction, always ask your healthcare professional. [address] any warranty or liability for your use of this information.

## 2023-06-08 NOTE — PROGRESS NOTES
Becky Del Rosario presents today for   Chief Complaint   Patient presents with    Follow-up     5 week follow up                  1. \"Have you been to the ER, urgent care clinic since your last visit? Hospitalized since your last visit? \" no    2. \"Have you seen or consulted any other health care providers outside of the [address] [place] [address] since your last visit? \" no     3. For patients aged 39-70: Has the patient had a colonoscopy / FIT/ Cologuard? Yes - no Care Gap present      If the patient is female:    4. For patients aged 41-77: Has the patient had a mammogram within the past 2 years? NA - based on age or sex      11. For patients aged 21-65: Has the patient had a pap smear?  NA - based on age or sex
normal   Pulse DP: 2+ (normal)   Filament test: normal sensation   Vibratory Sensation: normal        Review of Data:  Labs:  No visits with results within 2 Day(s) from this visit. Latest known visit with results is:   Orders Only on 06/01/2023   Component Date Value Ref Range Status    Glucose 06/01/2023 99  70 - 99 mg/dL Final    BUN 06/01/2023 17  8 - 27 mg/dL Final    Creatinine 06/01/2023 1.09  0.76 - 1.27 mg/dL Final    Est, Glomerular Filtration Rate 06/01/2023 73  >59 mL/min/1.73 Final    BUN/Creatinine Ratio 06/01/2023 16  10 - 24 Final    Sodium 06/01/2023 142  134 - 144 mmol/L Final    Potassium 06/01/2023 3.9  3.5 - 5.2 mmol/L Final    Chloride 06/01/2023 107 (H)  96 - 106 mmol/L Final    CO2 06/01/2023 23  20 - 29 mmol/L Final    Calcium 06/01/2023 9.3  8.6 - 10.2 mg/dL Final    Specimen Status Report 06/01/2023 COMMENT   Final         Health Maintenance:  Screening:               Colorectal: colonoscopy (9/7/2022) tubular adenoma. Dr. Griffin Parents. Due 9/2027. Depression: none              DM (HbA1c/FPG): HbA1c 6.7 (4/2023)              Hepatitis C: negative (9/2020) Dr. Kalpana Kim: none              DEXA: N/A              PSA/DANNI: PSA 0.5 (4/2023) [facility]              Glaucoma: regular eye exams with Dr. Alexia Horn of [address] [place] [address],[address] (last 2/2023)              Smoking: distant past              Vitamin D: 39.7 (4/2023)              Medicare Wellness: 7/6/2022    Impression:  Patient Active Problem List   Diagnosis    GERD (gastroesophageal reflux disease)    Family history of prostate cancer    DDD (degenerative disc disease), cervical    Hyperlipidemia    Vitamin D insufficiency    Essential hypertension    Chronic deep vein thrombosis (DVT) of femoral vein of right lower extremity (HCC)    History of gastric ulcer    Colon polyps    Multiple thyroid nodules    Overweight (BMI 25.0-29. 9)    Recurrent deep vein thrombosis (DVT) (HCC)    Urinary retention due to benign

## 2023-06-11 PROBLEM — E66.3 OVERWEIGHT (BMI 25.0-29.9): Status: ACTIVE | Noted: 2021-06-18
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
## 2023-07-05 ENCOUNTER — TELEMEDICINE (OUTPATIENT)
Age: 73
End: 2023-07-05
Payer: MEDICARE

## 2023-07-05 DIAGNOSIS — R33.8 URINARY RETENTION DUE TO BENIGN PROSTATIC HYPERPLASIA: ICD-10-CM

## 2023-07-05 DIAGNOSIS — I10 ESSENTIAL HYPERTENSION: ICD-10-CM

## 2023-07-05 DIAGNOSIS — R36.1 HEMATOSPERMIA: ICD-10-CM

## 2023-07-05 DIAGNOSIS — E78.00 PURE HYPERCHOLESTEROLEMIA: ICD-10-CM

## 2023-07-05 DIAGNOSIS — E11.29 TYPE 2 DIABETES MELLITUS WITH MICROALBUMINURIA, WITHOUT LONG-TERM CURRENT USE OF INSULIN (HCC): ICD-10-CM

## 2023-07-05 DIAGNOSIS — R80.9 TYPE 2 DIABETES MELLITUS WITH MICROALBUMINURIA, WITHOUT LONG-TERM CURRENT USE OF INSULIN (HCC): ICD-10-CM

## 2023-07-05 DIAGNOSIS — J30.2 SEASONAL ALLERGIC RHINITIS, UNSPECIFIED TRIGGER: Primary | ICD-10-CM

## 2023-07-05 DIAGNOSIS — N40.1 URINARY RETENTION DUE TO BENIGN PROSTATIC HYPERPLASIA: ICD-10-CM

## 2023-07-05 PROCEDURE — 2022F DILAT RTA XM EVC RTNOPTHY: CPT | Performed by: INTERNAL MEDICINE

## 2023-07-05 PROCEDURE — G8427 DOCREV CUR MEDS BY ELIG CLIN: HCPCS | Performed by: INTERNAL MEDICINE

## 2023-07-05 PROCEDURE — 3044F HG A1C LEVEL LT 7.0%: CPT | Performed by: INTERNAL MEDICINE

## 2023-07-05 PROCEDURE — 3017F COLORECTAL CA SCREEN DOC REV: CPT | Performed by: INTERNAL MEDICINE

## 2023-07-05 PROCEDURE — 1123F ACP DISCUSS/DSCN MKR DOCD: CPT | Performed by: INTERNAL MEDICINE

## 2023-07-05 PROCEDURE — 99214 OFFICE O/P EST MOD 30 MIN: CPT | Performed by: INTERNAL MEDICINE

## 2023-07-05 RX ORDER — FLUTICASONE PROPIONATE 50 MCG
2 SPRAY, SUSPENSION (ML) NASAL NIGHTLY
Qty: 16 G | Refills: 1 | Status: SHIPPED | OUTPATIENT
Start: 2023-07-05

## 2023-07-05 RX ORDER — BENZONATATE 100 MG/1
100 CAPSULE ORAL 3 TIMES DAILY PRN
Qty: 30 CAPSULE | Refills: 0 | Status: SHIPPED | OUTPATIENT
Start: 2023-07-05 | End: 2023-07-15

## 2023-07-05 RX ORDER — CETIRIZINE HYDROCHLORIDE 10 MG/1
10 TABLET ORAL DAILY
Qty: 30 TABLET | Refills: 1 | Status: SHIPPED | OUTPATIENT
Start: 2023-07-05

## 2023-07-05 ASSESSMENT — PATIENT HEALTH QUESTIONNAIRE - PHQ9
1. LITTLE INTEREST OR PLEASURE IN DOING THINGS: 0
2. FEELING DOWN, DEPRESSED OR HOPELESS: 0
SUM OF ALL RESPONSES TO PHQ QUESTIONS 1-9: 0
SUM OF ALL RESPONSES TO PHQ9 QUESTIONS 1 & 2: 0

## 2023-08-01 NOTE — TELEPHONE ENCOUNTER
PCP: Emily Skinner MD    metFORMIN (GLUCOPHAGE) 500 MG tablet 500 mg, Oral, 2 TIMES DAILY WITH MEALS Edit       Summary: Take 1 tablet by mouth 2 times daily (with meals)   Dose, Frequency: 500 mg, 2 TIMES DAILY WITH MEALS  Start: 9/21/2022  Ord/Sold: 2/17/2023 (O)            Future Appointments   Date Time Provider [address]   8/4/2023  2:20 PM Nicolas Saenz PAMAUREEN [facility] Sched   8/9/2023 10:00 AM Emily Skinner MD [facility] BS AMB   10/2/2023 10:00 AM [facility] US RM 1 Koko Avila

## 2023-08-08 LAB
ALBUMIN SERPL-MCNC: 4.1 G/DL (ref 3.9–4.9)
ALBUMIN/CREAT UR: 47 MG/G CREAT (ref 0–29)
ALBUMIN/GLOB SERPL: 1.7 {RATIO} (ref 1.2–2.2)
ALP SERPL-CCNC: 74 IU/L (ref 44–121)
ALT SERPL-CCNC: 13 IU/L (ref 0–44)
AST SERPL-CCNC: 13 IU/L (ref 0–40)
BASOPHILS # BLD AUTO: 0 X10E3/UL (ref 0–0.2)
BASOPHILS NFR BLD AUTO: 0 %
BILIRUB SERPL-MCNC: 0.4 MG/DL (ref 0–1.2)
BUN SERPL-MCNC: 15 MG/DL (ref 8–27)
BUN/CREAT SERPL: 13 (ref 10–24)
CALCIUM SERPL-MCNC: 9 MG/DL (ref 8.6–10.2)
CHLORIDE SERPL-SCNC: 105 MMOL/L (ref 96–106)
CHOLEST SERPL-MCNC: 142 MG/DL (ref 100–199)
CO2 SERPL-SCNC: 24 MMOL/L (ref 20–29)
CREAT SERPL-MCNC: 1.12 MG/DL (ref 0.76–1.27)
CREAT UR-MCNC: 124.8 MG/DL
EGFRCR SERPLBLD CKD-EPI 2021: 71 ML/MIN/1.73
EOSINOPHIL # BLD AUTO: 0.1 X10E3/UL (ref 0–0.4)
EOSINOPHIL NFR BLD AUTO: 1 %
ERYTHROCYTE [DISTWIDTH] IN BLOOD BY AUTOMATED COUNT: 13 % (ref 11.6–15.4)
GLOBULIN SER CALC-MCNC: 2.4 G/DL (ref 1.5–4.5)
GLUCOSE SERPL-MCNC: 123 MG/DL (ref 70–99)
HBA1C MFR BLD: 6.5 % (ref 4.8–5.6)
HCT VFR BLD AUTO: 37.6 % (ref 37.5–51)
HDLC SERPL-MCNC: 47 MG/DL
HGB BLD-MCNC: 11.8 G/DL (ref 13–17.7)
IMM GRANULOCYTES # BLD AUTO: 0 X10E3/UL (ref 0–0.1)
IMM GRANULOCYTES NFR BLD AUTO: 0 %
LDLC SERPL CALC-MCNC: 83 MG/DL (ref 0–99)
LYMPHOCYTES # BLD AUTO: 1.5 X10E3/UL (ref 0.7–3.1)
LYMPHOCYTES NFR BLD AUTO: 28 %
MCH RBC QN AUTO: 26.1 PG (ref 26.6–33)
MCHC RBC AUTO-ENTMCNC: 31.4 G/DL (ref 31.5–35.7)
MCV RBC AUTO: 83 FL (ref 79–97)
MICROALBUMIN UR-MCNC: 58.4 UG/ML
MONOCYTES # BLD AUTO: 0.5 X10E3/UL (ref 0.1–0.9)
MONOCYTES NFR BLD AUTO: 9 %
NEUTROPHILS # BLD AUTO: 3.3 X10E3/UL (ref 1.4–7)
NEUTROPHILS NFR BLD AUTO: 62 %
PLATELET # BLD AUTO: 217 X10E3/UL (ref 150–450)
POTASSIUM SERPL-SCNC: 4.6 MMOL/L (ref 3.5–5.2)
PROT SERPL-MCNC: 6.5 G/DL (ref 6–8.5)
RBC # BLD AUTO: 4.52 X10E6/UL (ref 4.14–5.8)
SODIUM SERPL-SCNC: 141 MMOL/L (ref 134–144)
SPECIMEN STATUS REPORT: NORMAL
TRIGL SERPL-MCNC: 58 MG/DL (ref 0–149)
TSH SERPL DL<=0.005 MIU/L-ACNC: 1.21 UIU/ML (ref 0.45–4.5)
VLDLC SERPL CALC-MCNC: 12 MG/DL (ref 5–40)
WBC # BLD AUTO: 5.3 X10E3/UL (ref 3.4–10.8)

## 2023-08-09 ENCOUNTER — TELEPHONE (OUTPATIENT)
Age: 73
End: 2023-08-09

## 2023-08-09 ENCOUNTER — OFFICE VISIT (OUTPATIENT)
Age: 73
End: 2023-08-09
Payer: MEDICARE

## 2023-08-09 VITALS
BODY MASS INDEX: 25.58 KG/M2 | HEART RATE: 66 BPM | RESPIRATION RATE: 16 BRPM | TEMPERATURE: 98.7 F | WEIGHT: 193 LBS | DIASTOLIC BLOOD PRESSURE: 70 MMHG | OXYGEN SATURATION: 100 % | SYSTOLIC BLOOD PRESSURE: 115 MMHG | HEIGHT: 73 IN

## 2023-08-09 DIAGNOSIS — I82.511 CHRONIC DEEP VEIN THROMBOSIS (DVT) OF FEMORAL VEIN OF RIGHT LOWER EXTREMITY (HCC): ICD-10-CM

## 2023-08-09 DIAGNOSIS — E11.29 TYPE 2 DIABETES MELLITUS WITH MICROALBUMINURIA, WITHOUT LONG-TERM CURRENT USE OF INSULIN (HCC): Primary | ICD-10-CM

## 2023-08-09 DIAGNOSIS — R33.8 URINARY RETENTION DUE TO BENIGN PROSTATIC HYPERPLASIA: ICD-10-CM

## 2023-08-09 DIAGNOSIS — Z00.00 MEDICARE ANNUAL WELLNESS VISIT, SUBSEQUENT: ICD-10-CM

## 2023-08-09 DIAGNOSIS — E04.2 MULTIPLE THYROID NODULES: ICD-10-CM

## 2023-08-09 DIAGNOSIS — D64.9 ANEMIA, UNSPECIFIED TYPE: ICD-10-CM

## 2023-08-09 DIAGNOSIS — E66.3 OVERWEIGHT (BMI 25.0-29.9): ICD-10-CM

## 2023-08-09 DIAGNOSIS — E78.00 PURE HYPERCHOLESTEROLEMIA: ICD-10-CM

## 2023-08-09 DIAGNOSIS — I10 ESSENTIAL HYPERTENSION: ICD-10-CM

## 2023-08-09 DIAGNOSIS — R80.9 TYPE 2 DIABETES MELLITUS WITH MICROALBUMINURIA, WITHOUT LONG-TERM CURRENT USE OF INSULIN (HCC): Primary | ICD-10-CM

## 2023-08-09 DIAGNOSIS — I82.409 RECURRENT DEEP VEIN THROMBOSIS (DVT) (HCC): ICD-10-CM

## 2023-08-09 DIAGNOSIS — Z79.01 ON CONTINUOUS ORAL ANTICOAGULATION: ICD-10-CM

## 2023-08-09 DIAGNOSIS — R36.1 HEMATOSPERMIA: ICD-10-CM

## 2023-08-09 DIAGNOSIS — N40.1 URINARY RETENTION DUE TO BENIGN PROSTATIC HYPERPLASIA: ICD-10-CM

## 2023-08-09 DIAGNOSIS — E55.9 VITAMIN D INSUFFICIENCY: ICD-10-CM

## 2023-08-09 DIAGNOSIS — Z71.89 ACP (ADVANCE CARE PLANNING): ICD-10-CM

## 2023-08-09 PROCEDURE — 99211 OFF/OP EST MAY X REQ PHY/QHP: CPT | Performed by: INTERNAL MEDICINE

## 2023-08-09 ASSESSMENT — PATIENT HEALTH QUESTIONNAIRE - PHQ9
SUM OF ALL RESPONSES TO PHQ QUESTIONS 1-9: 0
1. LITTLE INTEREST OR PLEASURE IN DOING THINGS: 0
2. FEELING DOWN, DEPRESSED OR HOPELESS: 0
SUM OF ALL RESPONSES TO PHQ QUESTIONS 1-9: 0
SUM OF ALL RESPONSES TO PHQ QUESTIONS 1-9: 0
SUM OF ALL RESPONSES TO PHQ9 QUESTIONS 1 & 2: 0
SUM OF ALL RESPONSES TO PHQ QUESTIONS 1-9: 0

## 2023-08-09 ASSESSMENT — LIFESTYLE VARIABLES
HOW MANY STANDARD DRINKS CONTAINING ALCOHOL DO YOU HAVE ON A TYPICAL DAY: PATIENT DOES NOT DRINK
HOW OFTEN DO YOU HAVE A DRINK CONTAINING ALCOHOL: NEVER

## 2023-08-09 NOTE — TELEPHONE ENCOUNTER
Pt will be going offsite for 3 month f/u labs.  Please advice when orders are ready to send orders to pt

## 2023-08-10 NOTE — ACP (ADVANCE CARE PLANNING)
Advance Care Planning     Advance Care Planning (ACP) Physician/NP/PA Conversation    Date of Conversation: 8/9/2023  Conducted with: Patient with Decision Making Capacity    Healthcare Decision Maker:      Primary Decision Maker: Iona Ledesma - Spouse - [phone]    Click here to complete [address] including selection of the Healthcare Decision Maker Relationship (ie \"Primary\")  Today we documented Decision Maker(s) consistent with Legal Next of Kin hierarchy. Care Preferences:    Hospitalization: \"If your health worsens and it becomes clear that your chance of recovery is unlikely, what would be your preference regarding hospitalization? \"  The patient would prefer hospitalization. Ventilation: \"If you were unable to breath on your own and your chance of recovery was unlikely, what would be your preference about the use of a ventilator (breathing machine) if it was available to you? \"  The patient would desire the use of a ventilator. Resuscitation: \"In the event your heart stopped as a result of an underlying serious health condition, would you want attempts made to restart your heart, or would you prefer a natural death? \"  Yes, attempt to resuscitate.     treatment goals, benefit/burden of treatment options, ventilation preferences, hospitalization preferences, resuscitation preferences, and end of life care preferences (vegetative state/imminent death)    Conversation Outcomes / Follow-Up Plan:  ACP in process - completing/providing documents  Reviewed DNR/DNI and patient elects Full Code (Attempt Resuscitation)    Length of Voluntary ACP Conversation in minutes:  16 minutes    Maren Ridley MD

## 2023-08-13 PROBLEM — D64.9 ANEMIA: Status: ACTIVE | Noted: 2023-08-13

## 2023-08-13 PROBLEM — Z79.01 ON CONTINUOUS ORAL ANTICOAGULATION: Status: ACTIVE | Noted: 2023-08-13

## 2023-09-22 RX ORDER — APIXABAN 5 MG/1
TABLET, FILM COATED ORAL
Qty: 180 TABLET | Refills: 0 | Status: SHIPPED | OUTPATIENT
Start: 2023-09-22

## 2023-10-02 ENCOUNTER — HOSPITAL ENCOUNTER (OUTPATIENT)
Facility: HOSPITAL | Age: 73
Discharge: HOME | End: 2023-10-05
Payer: MEDICARE

## 2023-10-02 DIAGNOSIS — E04.2 MULTIPLE THYROID NODULES: ICD-10-CM

## 2023-10-02 PROCEDURE — 76536 US EXAM OF HEAD AND NECK: CPT

## 2023-11-03 LAB
BASOPHILS # BLD AUTO: 0 X10E3/UL (ref 0–0.2)
BASOPHILS NFR BLD AUTO: 1 %
EOSINOPHIL # BLD AUTO: 0.1 X10E3/UL (ref 0–0.4)
EOSINOPHIL NFR BLD AUTO: 1 %
ERYTHROCYTE [DISTWIDTH] IN BLOOD BY AUTOMATED COUNT: 12.9 % (ref 11.6–15.4)
HCT VFR BLD AUTO: 39.2 % (ref 37.5–51)
HGB BLD-MCNC: 12.3 G/DL (ref 13–17.7)
IMM GRANULOCYTES # BLD AUTO: 0 X10E3/UL (ref 0–0.1)
IMM GRANULOCYTES NFR BLD AUTO: 0 %
LYMPHOCYTES # BLD AUTO: 1.2 X10E3/UL (ref 0.7–3.1)
LYMPHOCYTES NFR BLD AUTO: 27 %
MCH RBC QN AUTO: 26.8 PG (ref 26.6–33)
MCHC RBC AUTO-ENTMCNC: 31.4 G/DL (ref 31.5–35.7)
MCV RBC AUTO: 85 FL (ref 79–97)
MONOCYTES # BLD AUTO: 0.3 X10E3/UL (ref 0.1–0.9)
MONOCYTES NFR BLD AUTO: 8 %
NEUTROPHILS # BLD AUTO: 2.7 X10E3/UL (ref 1.4–7)
NEUTROPHILS NFR BLD AUTO: 63 %
PLATELET # BLD AUTO: 195 X10E3/UL (ref 150–450)
RBC # BLD AUTO: 4.59 X10E6/UL (ref 4.14–5.8)
SPECIMEN STATUS REPORT: NORMAL
WBC # BLD AUTO: 4.3 X10E3/UL (ref 3.4–10.8)

## 2023-11-04 LAB
25(OH)D3+25(OH)D2 SERPL-MCNC: 35.8 NG/ML (ref 30–100)
ALBUMIN SERPL-MCNC: 4.2 G/DL (ref 3.8–4.8)
ALBUMIN/CREAT UR: 7 MG/G CREAT (ref 0–29)
ALBUMIN/GLOB SERPL: 2 {RATIO} (ref 1.2–2.2)
ALP SERPL-CCNC: 66 IU/L (ref 44–121)
ALT SERPL-CCNC: 11 IU/L (ref 0–44)
AST SERPL-CCNC: 15 IU/L (ref 0–40)
BILIRUB SERPL-MCNC: 0.7 MG/DL (ref 0–1.2)
BUN SERPL-MCNC: 17 MG/DL (ref 8–27)
BUN/CREAT SERPL: 16 (ref 10–24)
CALCIUM SERPL-MCNC: 9.1 MG/DL (ref 8.6–10.2)
CHLORIDE SERPL-SCNC: 105 MMOL/L (ref 96–106)
CHOLEST SERPL-MCNC: 135 MG/DL (ref 100–199)
CO2 SERPL-SCNC: 25 MMOL/L (ref 20–29)
CREAT SERPL-MCNC: 1.06 MG/DL (ref 0.76–1.27)
CREAT UR-MCNC: 138.2 MG/DL
EGFRCR SERPLBLD CKD-EPI 2021: 75 ML/MIN/1.73
GLOBULIN SER CALC-MCNC: 2.1 G/DL (ref 1.5–4.5)
GLUCOSE SERPL-MCNC: 101 MG/DL (ref 70–99)
HBA1C MFR BLD: 5.8 % (ref 4.8–5.6)
HDLC SERPL-MCNC: 48 MG/DL
LDLC SERPL CALC-MCNC: 72 MG/DL (ref 0–99)
MICROALBUMIN UR-MCNC: 9.4 UG/ML
POTASSIUM SERPL-SCNC: 3.9 MMOL/L (ref 3.5–5.2)
PROT SERPL-MCNC: 6.3 G/DL (ref 6–8.5)
SODIUM SERPL-SCNC: 141 MMOL/L (ref 134–144)
TRIGL SERPL-MCNC: 76 MG/DL (ref 0–149)
VLDLC SERPL CALC-MCNC: 15 MG/DL (ref 5–40)

## 2023-11-09 ENCOUNTER — OFFICE VISIT (OUTPATIENT)
Age: 73
End: 2023-11-09

## 2023-11-09 VITALS
HEART RATE: 82 BPM | TEMPERATURE: 98.3 F | HEIGHT: 73 IN | DIASTOLIC BLOOD PRESSURE: 68 MMHG | OXYGEN SATURATION: 97 % | SYSTOLIC BLOOD PRESSURE: 126 MMHG | WEIGHT: 194 LBS | RESPIRATION RATE: 16 BRPM | BODY MASS INDEX: 25.71 KG/M2

## 2023-11-09 DIAGNOSIS — I10 ESSENTIAL HYPERTENSION: ICD-10-CM

## 2023-11-09 DIAGNOSIS — Z79.01 ON CONTINUOUS ORAL ANTICOAGULATION: ICD-10-CM

## 2023-11-09 DIAGNOSIS — R33.8 URINARY RETENTION DUE TO BENIGN PROSTATIC HYPERPLASIA: ICD-10-CM

## 2023-11-09 DIAGNOSIS — Z12.5 PROSTATE CANCER SCREENING: ICD-10-CM

## 2023-11-09 DIAGNOSIS — I82.511 CHRONIC DEEP VEIN THROMBOSIS (DVT) OF FEMORAL VEIN OF RIGHT LOWER EXTREMITY (HCC): ICD-10-CM

## 2023-11-09 DIAGNOSIS — E11.29 TYPE 2 DIABETES MELLITUS WITH MICROALBUMINURIA, WITHOUT LONG-TERM CURRENT USE OF INSULIN (HCC): Primary | ICD-10-CM

## 2023-11-09 DIAGNOSIS — N40.1 URINARY RETENTION DUE TO BENIGN PROSTATIC HYPERPLASIA: ICD-10-CM

## 2023-11-09 DIAGNOSIS — E55.9 VITAMIN D INSUFFICIENCY: ICD-10-CM

## 2023-11-09 DIAGNOSIS — D64.9 ANEMIA, UNSPECIFIED TYPE: ICD-10-CM

## 2023-11-09 DIAGNOSIS — E78.00 PURE HYPERCHOLESTEROLEMIA: ICD-10-CM

## 2023-11-09 DIAGNOSIS — R80.9 TYPE 2 DIABETES MELLITUS WITH MICROALBUMINURIA, WITHOUT LONG-TERM CURRENT USE OF INSULIN (HCC): Primary | ICD-10-CM

## 2023-11-09 DIAGNOSIS — I82.409 RECURRENT DEEP VEIN THROMBOSIS (DVT) (HCC): ICD-10-CM

## 2023-11-09 RX ORDER — LOSARTAN POTASSIUM 25 MG/1
25 TABLET ORAL DAILY
Qty: 90 TABLET | Refills: 3 | Status: SHIPPED | OUTPATIENT
Start: 2023-11-09

## 2023-11-09 ASSESSMENT — PATIENT HEALTH QUESTIONNAIRE - PHQ9
SUM OF ALL RESPONSES TO PHQ9 QUESTIONS 1 & 2: 0
SUM OF ALL RESPONSES TO PHQ QUESTIONS 1-9: 0
2. FEELING DOWN, DEPRESSED OR HOPELESS: 0
SUM OF ALL RESPONSES TO PHQ QUESTIONS 1-9: 0
1. LITTLE INTEREST OR PLEASURE IN DOING THINGS: 0
SUM OF ALL RESPONSES TO PHQ QUESTIONS 1-9: 0
SUM OF ALL RESPONSES TO PHQ QUESTIONS 1-9: 0
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
## 2023-11-09 NOTE — PATIENT INSTRUCTIONS
This will help you adjust to the taste. Do not add salt after cooking. One teaspoon of salt has about 2,300 mg of sodium. Take the salt shaker off the table. Flavor your food with garlic, lemon juice, onion, vinegar, herbs, and spices. Do not use soy sauce, lite soy sauce, steak sauce, onion salt, garlic salt, celery salt, mustard, or ketchup on your food. Use low-sodium salad dressings, sauces, and ketchup. Or make your own salad dressings and sauces without adding salt. Use less salt (or none) when recipes call for it. You can often use half the salt a recipe calls for without losing flavor. Other foods such as rice, pasta, and grains do not need added salt. Rinse canned vegetables, and cook them in fresh water. This removes some--but not all--of the salt. Avoid water that is naturally high in sodium or that has been treated with water softeners, which add sodium. Call your local water company to find out the sodium content of your water supply. If you buy bottled water, read the label and choose a sodium-free brand. Avoid high-sodium foods  Avoid eating:  Smoked, cured, salted, and canned meat, fish, and poultry. Ham, prince, hot dogs, and luncheon meats. Regular, hard, and processed cheese and regular peanut butter. Crackers with salted tops, and other salted snack foods such as pretzels, chips, and salted popcorn. Frozen prepared meals, unless labeled low-sodium. Canned and dried soups, broths, and bouillon, unless labeled sodium-free or low-sodium. Canned vegetables, unless labeled sodium-free or low-sodium. Tiara Yoan fries, pizza, tacos, and other fast foods. Pickles, olives, ketchup, and other condiments, especially soy sauce, unless labeled sodium-free or low-sodium. Where can you learn more? Go to http://www.gray.com/  Enter V843 in the search box to learn more about \"Low Sodium Diet (2,000 Milligram): Care Instructions. \"  Current as of: August 22, 2019

## 2023-11-12 PROBLEM — R36.1 HEMATOSPERMIA: Status: RESOLVED | Noted: 2023-04-30 | Resolved: 2023-11-12

## 2024-01-15 RX ORDER — ATORVASTATIN CALCIUM 40 MG/1
40 TABLET, FILM COATED ORAL DAILY
Qty: 90 TABLET | Refills: 3 | Status: SHIPPED | OUTPATIENT
Start: 2024-01-15

## 2024-02-09 LAB
BASOPHILS # BLD AUTO: 0 X10E3/UL (ref 0–0.2)
BASOPHILS NFR BLD AUTO: 0 %
EOSINOPHIL # BLD AUTO: 0.1 X10E3/UL (ref 0–0.4)
EOSINOPHIL NFR BLD AUTO: 1 %
ERYTHROCYTE [DISTWIDTH] IN BLOOD BY AUTOMATED COUNT: 13.1 % (ref 11.6–15.4)
HCT VFR BLD AUTO: 40.8 % (ref 37.5–51)
HGB BLD-MCNC: 12.9 G/DL (ref 13–17.7)
IMM GRANULOCYTES # BLD AUTO: 0 X10E3/UL (ref 0–0.1)
IMM GRANULOCYTES NFR BLD AUTO: 0 %
LYMPHOCYTES # BLD AUTO: 1.5 X10E3/UL (ref 0.7–3.1)
LYMPHOCYTES NFR BLD AUTO: 37 %
MCH RBC QN AUTO: 26.3 PG (ref 26.6–33)
MCHC RBC AUTO-ENTMCNC: 31.6 G/DL (ref 31.5–35.7)
MCV RBC AUTO: 83 FL (ref 79–97)
MONOCYTES # BLD AUTO: 0.4 X10E3/UL (ref 0.1–0.9)
MONOCYTES NFR BLD AUTO: 9 %
NEUTROPHILS # BLD AUTO: 2.1 X10E3/UL (ref 1.4–7)
NEUTROPHILS NFR BLD AUTO: 53 %
PLATELET # BLD AUTO: 193 X10E3/UL (ref 150–450)
RBC # BLD AUTO: 4.9 X10E6/UL (ref 4.14–5.8)
SPECIMEN STATUS REPORT: NORMAL
WBC # BLD AUTO: 4 X10E3/UL (ref 3.4–10.8)

## 2024-02-10 LAB
ALBUMIN SERPL-MCNC: 4.2 G/DL (ref 3.8–4.8)
ALBUMIN/GLOB SERPL: 1.8 {RATIO} (ref 1.2–2.2)
ALP SERPL-CCNC: 72 IU/L (ref 44–121)
ALT SERPL-CCNC: 18 IU/L (ref 0–44)
AST SERPL-CCNC: 15 IU/L (ref 0–40)
BILIRUB SERPL-MCNC: 0.5 MG/DL (ref 0–1.2)
BUN SERPL-MCNC: 12 MG/DL (ref 8–27)
BUN/CREAT SERPL: 11 (ref 10–24)
CALCIUM SERPL-MCNC: 9.2 MG/DL (ref 8.6–10.2)
CHLORIDE SERPL-SCNC: 106 MMOL/L (ref 96–106)
CHOLEST SERPL-MCNC: 141 MG/DL (ref 100–199)
CO2 SERPL-SCNC: 24 MMOL/L (ref 20–29)
CREAT SERPL-MCNC: 1.11 MG/DL (ref 0.76–1.27)
EGFRCR SERPLBLD CKD-EPI 2021: 71 ML/MIN/1.73
FERRITIN SERPL-MCNC: 98 NG/ML (ref 30–400)
FOLATE SERPL-MCNC: >20 NG/ML
GLOBULIN SER CALC-MCNC: 2.3 G/DL (ref 1.5–4.5)
GLUCOSE SERPL-MCNC: 105 MG/DL (ref 70–99)
HBA1C MFR BLD: 6.3 % (ref 4.8–5.6)
HDLC SERPL-MCNC: 48 MG/DL
IRON SATN MFR SERPL: 35 % (ref 15–55)
IRON SERPL-MCNC: 94 UG/DL (ref 38–169)
LDLC SERPL CALC-MCNC: 74 MG/DL (ref 0–99)
POTASSIUM SERPL-SCNC: 4.5 MMOL/L (ref 3.5–5.2)
PSA SERPL-MCNC: 0.5 NG/ML (ref 0–4)
SODIUM SERPL-SCNC: 141 MMOL/L (ref 134–144)
TIBC SERPL-MCNC: 266 UG/DL (ref 250–450)
TRIGL SERPL-MCNC: 106 MG/DL (ref 0–149)
UIBC SERPL-MCNC: 172 UG/DL (ref 111–343)
VIT B12 SERPL-MCNC: >2000 PG/ML (ref 232–1245)
VLDLC SERPL CALC-MCNC: 19 MG/DL (ref 5–40)

## 2024-02-11 LAB
ALBUMIN/CREAT UR: 5 MG/G CREAT (ref 0–29)
CREAT UR-MCNC: 173 MG/DL
MICROALBUMIN UR-MCNC: 8.9 UG/ML

## 2024-02-12 LAB
ALBUMIN 24H MFR UR ELPH: 31.3 %
ALPHA1 GLOB 24H MFR UR ELPH: 9.5 %
ALPHA2 GLOB 24H MFR UR ELPH: 17.1 %
B-GLOBULIN MFR UR ELPH: 31.5 %
GAMMA GLOB 24H MFR UR ELPH: 10.5 %
INTERPRETATION UR IFE-IMP: NORMAL
Lab: NORMAL
M PROTEIN 24H MFR UR ELPH: NORMAL %
PROT UR-MCNC: 7.8 MG/DL

## 2024-02-14 LAB
ALBUMIN SERPL ELPH-MCNC: 3.8 G/DL (ref 2.9–4.4)
ALBUMIN/GLOB SERPL: 1.5 {RATIO} (ref 0.7–1.7)
ALPHA1 GLOB SERPL ELPH-MCNC: 0.2 G/DL (ref 0–0.4)
ALPHA2 GLOB SERPL ELPH-MCNC: 0.7 G/DL (ref 0.4–1)
B-GLOBULIN SERPL ELPH-MCNC: 1 G/DL (ref 0.7–1.3)
GAMMA GLOB SERPL ELPH-MCNC: 0.7 G/DL (ref 0.4–1.8)
GLOBULIN SER-MCNC: 2.7 G/DL (ref 2.2–3.9)
IGA SERPL-MCNC: 145 MG/DL (ref 61–437)
IGG SERPL-MCNC: 956 MG/DL (ref 603–1613)
IGM SERPL-MCNC: 17 MG/DL (ref 15–143)
INTERPRETATION SERPL IEP-IMP: ABNORMAL
KAPPA LC FREE SER-MCNC: 25.2 MG/L (ref 3.3–19.4)
KAPPA LC FREE/LAMBDA FREE SER: 2.14 {RATIO} (ref 0.26–1.65)
LABORATORY COMMENT REPORT: ABNORMAL
LAMBDA LC FREE SERPL-MCNC: 11.8 MG/L (ref 5.7–26.3)
M PROTEIN SERPL ELPH-MCNC: ABNORMAL G/DL
PROT SERPL-MCNC: 6.5 G/DL (ref 6–8.5)

## 2024-02-15 ENCOUNTER — OFFICE VISIT (OUTPATIENT)
Age: 74
End: 2024-02-15

## 2024-02-15 VITALS
SYSTOLIC BLOOD PRESSURE: 122 MMHG | RESPIRATION RATE: 16 BRPM | OXYGEN SATURATION: 99 % | BODY MASS INDEX: 26.11 KG/M2 | HEIGHT: 73 IN | HEART RATE: 85 BPM | DIASTOLIC BLOOD PRESSURE: 72 MMHG | WEIGHT: 197 LBS | TEMPERATURE: 98.4 F

## 2024-02-15 DIAGNOSIS — E11.29 TYPE 2 DIABETES MELLITUS WITH MICROALBUMINURIA, WITHOUT LONG-TERM CURRENT USE OF INSULIN (HCC): Primary | ICD-10-CM

## 2024-02-15 DIAGNOSIS — N40.1 URINARY RETENTION DUE TO BENIGN PROSTATIC HYPERPLASIA: ICD-10-CM

## 2024-02-15 DIAGNOSIS — I82.409 RECURRENT DEEP VEIN THROMBOSIS (DVT) (HCC): ICD-10-CM

## 2024-02-15 DIAGNOSIS — E66.3 OVERWEIGHT (BMI 25.0-29.9): ICD-10-CM

## 2024-02-15 DIAGNOSIS — R33.8 URINARY RETENTION DUE TO BENIGN PROSTATIC HYPERPLASIA: ICD-10-CM

## 2024-02-15 DIAGNOSIS — I10 ESSENTIAL HYPERTENSION: ICD-10-CM

## 2024-02-15 DIAGNOSIS — R80.9 TYPE 2 DIABETES MELLITUS WITH MICROALBUMINURIA, WITHOUT LONG-TERM CURRENT USE OF INSULIN (HCC): Primary | ICD-10-CM

## 2024-02-15 DIAGNOSIS — Z79.01 ON CONTINUOUS ORAL ANTICOAGULATION: ICD-10-CM

## 2024-02-15 DIAGNOSIS — I82.511 CHRONIC DEEP VEIN THROMBOSIS (DVT) OF FEMORAL VEIN OF RIGHT LOWER EXTREMITY (HCC): ICD-10-CM

## 2024-02-15 DIAGNOSIS — E78.00 PURE HYPERCHOLESTEROLEMIA: ICD-10-CM

## 2024-02-15 NOTE — PROGRESS NOTES
Vikas Ledesma presents today for   Chief Complaint   Patient presents with    3 Month Follow-Up       \"Have you been to the ER, urgent care clinic since your last visit?  Hospitalized since your last visit?\"    NO    “Have you seen or consulted any other health care providers outside of [facility] since your last visit?”    NO          
difficulty with hematospermia for 4 months and was prescribed doxycycline for 14 days.  However, he did not note any benefit and returned for follow-up on 4/4/2023.  His semen was sent out for Micro[facility] analysis on 4/11/2023.  He continues to take Flomax and Avodart.     In 6/2020, he noted right knee pain and swelling after walking which had been present for several weeks. He stated that although initially very painful, it had been gradually improving. He denied any known injury, fever, chills, recent travel, chest pain or shortness of breath. On 7/11/2020, he presented to [facility] ED due to worsening right leg pain and swelling, He reported that he had started wearing a knee brace one week prior for his pain and subsequently noted the onset of increased swelling. D-dimer was elevated at 9.81, and he underwent lower extremity PVL (7/11/2020) showing a poorly attached, acute and non-occlusive deep venous thrombosis in the right femoral veins; acute, occlusive deep venous thrombosis in the right popliteal veins; acute, non-occlusive deep venous thrombosis in the right posterior tibial and peroneal veins; acute, occlusive muscular deep venous thrombosis in the right gastrocnemius calf veins. He received one dose of Lovenox, and was transferred to [facility] for admission. He was subsequently discharged on Eliquis and instructed to use Tylenol for the pain. He underwent repeat lower extremity duplex on 7/24/2020 for worsening pain, and findings were similar to prior duplex at [facility] without extension of clot. He was referred to Dr. Landis for evaluation of hypercoagulability given his recurrent extensive DVT without provocation. Chest x-ray was obtained (7/28/2020) and was negative, and blood tests were also negative for hypercoagulable state.   He established care with Dr. Lopez and underwent repeat LE venous duplex scan (11/5/2020) showing a chronic non-occlusive muscular deep venous thrombosis

## 2024-04-08 RX ORDER — SITAGLIPTIN 100 MG/1
100 TABLET, FILM COATED ORAL DAILY
Qty: 90 TABLET | Refills: 3 | Status: SHIPPED | OUTPATIENT
Start: 2024-04-08

## 2024-05-28 LAB
BASOPHILS # BLD AUTO: 0 X10E3/UL (ref 0–0.2)
BASOPHILS NFR BLD AUTO: 1 %
EOSINOPHIL # BLD AUTO: 0.1 X10E3/UL (ref 0–0.4)
EOSINOPHIL NFR BLD AUTO: 1 %
ERYTHROCYTE [DISTWIDTH] IN BLOOD BY AUTOMATED COUNT: 12.4 % (ref 11.6–15.4)
HBA1C MFR BLD: 6 % (ref 4.8–5.6)
HCT VFR BLD AUTO: 41.5 % (ref 37.5–51)
HGB BLD-MCNC: 12.9 G/DL (ref 13–17.7)
IMM GRANULOCYTES # BLD AUTO: 0 X10E3/UL (ref 0–0.1)
IMM GRANULOCYTES NFR BLD AUTO: 0 %
LYMPHOCYTES # BLD AUTO: 1.5 X10E3/UL (ref 0.7–3.1)
LYMPHOCYTES NFR BLD AUTO: 35 %
MCH RBC QN AUTO: 26.9 PG (ref 26.6–33)
MCHC RBC AUTO-ENTMCNC: 31.1 G/DL (ref 31.5–35.7)
MCV RBC AUTO: 87 FL (ref 79–97)
MONOCYTES # BLD AUTO: 0.4 X10E3/UL (ref 0.1–0.9)
MONOCYTES NFR BLD AUTO: 9 %
NEUTROPHILS # BLD AUTO: 2.3 X10E3/UL (ref 1.4–7)
NEUTROPHILS NFR BLD AUTO: 54 %
PLATELET # BLD AUTO: 203 X10E3/UL (ref 150–450)
RBC # BLD AUTO: 4.8 X10E6/UL (ref 4.14–5.8)
SPECIMEN STATUS REPORT: NORMAL
WBC # BLD AUTO: 4.3 X10E3/UL (ref 3.4–10.8)

## 2024-05-29 LAB
ALBUMIN SERPL-MCNC: 4.4 G/DL (ref 3.8–4.8)
ALBUMIN/CREAT UR: <3 MG/G CREAT (ref 0–29)
ALBUMIN/GLOB SERPL: 2.1 {RATIO} (ref 1.2–2.2)
ALP SERPL-CCNC: 76 IU/L (ref 44–121)
ALT SERPL-CCNC: 16 IU/L (ref 0–44)
AST SERPL-CCNC: 14 IU/L (ref 0–40)
BILIRUB SERPL-MCNC: 0.6 MG/DL (ref 0–1.2)
BUN SERPL-MCNC: 21 MG/DL (ref 8–27)
BUN/CREAT SERPL: 19 (ref 10–24)
CALCIUM SERPL-MCNC: 9.3 MG/DL (ref 8.6–10.2)
CHLORIDE SERPL-SCNC: 104 MMOL/L (ref 96–106)
CHOLEST SERPL-MCNC: 138 MG/DL (ref 100–199)
CO2 SERPL-SCNC: 24 MMOL/L (ref 20–29)
CREAT SERPL-MCNC: 1.09 MG/DL (ref 0.76–1.27)
CREAT UR-MCNC: 114.3 MG/DL
EGFRCR SERPLBLD CKD-EPI 2021: 73 ML/MIN/1.73
GLOBULIN SER CALC-MCNC: 2.1 G/DL (ref 1.5–4.5)
GLUCOSE SERPL-MCNC: 98 MG/DL (ref 70–99)
HDLC SERPL-MCNC: 49 MG/DL
LDLC SERPL CALC-MCNC: 72 MG/DL (ref 0–99)
MICROALBUMIN UR-MCNC: <3 UG/ML
POTASSIUM SERPL-SCNC: 4.7 MMOL/L (ref 3.5–5.2)
PROT SERPL-MCNC: 6.5 G/DL (ref 6–8.5)
SODIUM SERPL-SCNC: 141 MMOL/L (ref 134–144)
TRIGL SERPL-MCNC: 88 MG/DL (ref 0–149)
VLDLC SERPL CALC-MCNC: 17 MG/DL (ref 5–40)

## 2024-05-30 ENCOUNTER — TELEPHONE (OUTPATIENT)
Age: 74
End: 2024-05-30

## 2024-05-30 ENCOUNTER — OFFICE VISIT (OUTPATIENT)
Age: 74
End: 2024-05-30

## 2024-05-30 VITALS
BODY MASS INDEX: 25.05 KG/M2 | HEART RATE: 79 BPM | DIASTOLIC BLOOD PRESSURE: 60 MMHG | WEIGHT: 189 LBS | HEIGHT: 73 IN | OXYGEN SATURATION: 98 % | TEMPERATURE: 98.5 F | RESPIRATION RATE: 16 BRPM | SYSTOLIC BLOOD PRESSURE: 110 MMHG

## 2024-05-30 DIAGNOSIS — R80.9 TYPE 2 DIABETES MELLITUS WITH MICROALBUMINURIA, WITHOUT LONG-TERM CURRENT USE OF INSULIN (HCC): Primary | ICD-10-CM

## 2024-05-30 DIAGNOSIS — E55.9 VITAMIN D INSUFFICIENCY: ICD-10-CM

## 2024-05-30 DIAGNOSIS — E11.29 TYPE 2 DIABETES MELLITUS WITH MICROALBUMINURIA, WITHOUT LONG-TERM CURRENT USE OF INSULIN (HCC): Primary | ICD-10-CM

## 2024-05-30 DIAGNOSIS — D64.9 ANEMIA, UNSPECIFIED TYPE: ICD-10-CM

## 2024-05-30 DIAGNOSIS — I82.409 RECURRENT DEEP VEIN THROMBOSIS (DVT) (HCC): ICD-10-CM

## 2024-05-30 DIAGNOSIS — I10 ESSENTIAL HYPERTENSION: ICD-10-CM

## 2024-05-30 DIAGNOSIS — Z79.01 ON CONTINUOUS ORAL ANTICOAGULATION: ICD-10-CM

## 2024-05-30 DIAGNOSIS — R33.8 URINARY RETENTION DUE TO BENIGN PROSTATIC HYPERPLASIA: ICD-10-CM

## 2024-05-30 DIAGNOSIS — I82.511 CHRONIC DEEP VEIN THROMBOSIS (DVT) OF FEMORAL VEIN OF RIGHT LOWER EXTREMITY (HCC): ICD-10-CM

## 2024-05-30 DIAGNOSIS — E78.00 PURE HYPERCHOLESTEROLEMIA: ICD-10-CM

## 2024-05-30 DIAGNOSIS — N40.1 URINARY RETENTION DUE TO BENIGN PROSTATIC HYPERPLASIA: ICD-10-CM

## 2024-05-30 SDOH — ECONOMIC STABILITY: INCOME INSECURITY: HOW HARD IS IT FOR YOU TO PAY FOR THE VERY BASICS LIKE FOOD, HOUSING, MEDICAL CARE, AND HEATING?: NOT HARD AT ALL

## 2024-05-30 SDOH — ECONOMIC STABILITY: FOOD INSECURITY: WITHIN THE PAST 12 MONTHS, THE FOOD YOU BOUGHT JUST DIDN'T LAST AND YOU DIDN'T HAVE MONEY TO GET MORE.: NEVER TRUE

## 2024-05-30 SDOH — ECONOMIC STABILITY: FOOD INSECURITY: WITHIN THE PAST 12 MONTHS, YOU WORRIED THAT YOUR FOOD WOULD RUN OUT BEFORE YOU GOT MONEY TO BUY MORE.: NEVER TRUE

## 2024-05-30 NOTE — PATIENT INSTRUCTIONS
weight, even 5 to 10 pounds, can reduce your risk for having a heart attack or stroke.  Do not smoke.  When should you call for help?  Watch closely for changes in your health, and be sure to contact your doctor if:    You need help making lifestyle changes.     You have questions about your medicine.   Where can you learn more?  Go to https://www.Haileo.net/CatmojipConnections  Enter I865 in the search box to learn more about \"High Cholesterol: Care Instructions.\"  Current as of: December 16, 2019               Content Version: 12.6  © 7761-9048 [employer].   Care instructions adapted under license by [employer] (which disclaims liability or warranty for this information). If you have questions about a medical condition or this instruction, always ask your healthcare professional. [employer] disclaims any warranty or liability for your use of this information.
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
## 2024-05-30 NOTE — TELEPHONE ENCOUNTER
Please request recent eye exam from Dr. Pat Torres at [facility]. Patient reports being seen in 2/2024.    Phone number [phone].    Thank you.

## 2024-09-30 SDOH — HEALTH STABILITY: PHYSICAL HEALTH: ON AVERAGE, HOW MANY DAYS PER WEEK DO YOU ENGAGE IN MODERATE TO STRENUOUS EXERCISE (LIKE A BRISK WALK)?: 5 DAYS

## 2024-09-30 SDOH — HEALTH STABILITY: PHYSICAL HEALTH: ON AVERAGE, HOW MANY MINUTES DO YOU ENGAGE IN EXERCISE AT THIS LEVEL?: 30 MIN

## 2024-09-30 ASSESSMENT — LIFESTYLE VARIABLES
HOW MANY STANDARD DRINKS CONTAINING ALCOHOL DO YOU HAVE ON A TYPICAL DAY: PATIENT DOES NOT DRINK
HOW OFTEN DO YOU HAVE A DRINK CONTAINING ALCOHOL: NEVER
HOW MANY STANDARD DRINKS CONTAINING ALCOHOL DO YOU HAVE ON A TYPICAL DAY: 0
HOW OFTEN DO YOU HAVE A DRINK CONTAINING ALCOHOL: 1
HOW OFTEN DO YOU HAVE SIX OR MORE DRINKS ON ONE OCCASION: 1

## 2024-09-30 ASSESSMENT — PATIENT HEALTH QUESTIONNAIRE - PHQ9
1. LITTLE INTEREST OR PLEASURE IN DOING THINGS: NOT AT ALL
SUM OF ALL RESPONSES TO PHQ QUESTIONS 1-9: 0
2. FEELING DOWN, DEPRESSED OR HOPELESS: NOT AT ALL
SUM OF ALL RESPONSES TO PHQ9 QUESTIONS 1 & 2: 0
SUM OF ALL RESPONSES TO PHQ QUESTIONS 1-9: 0

## 2024-10-02 ENCOUNTER — OFFICE VISIT (OUTPATIENT)
Facility: CLINIC | Age: 74
End: 2024-10-02

## 2024-10-02 ENCOUNTER — HOSPITAL ENCOUNTER (OUTPATIENT)
Facility: HOSPITAL | Age: 74
Setting detail: SPECIMEN
Discharge: HOME | End: 2024-10-05
Payer: MEDICARE

## 2024-10-02 VITALS
SYSTOLIC BLOOD PRESSURE: 134 MMHG | DIASTOLIC BLOOD PRESSURE: 74 MMHG | OXYGEN SATURATION: 100 % | RESPIRATION RATE: 14 BRPM | BODY MASS INDEX: 25.18 KG/M2 | HEART RATE: 66 BPM | HEIGHT: 73 IN | WEIGHT: 190 LBS | TEMPERATURE: 98.7 F

## 2024-10-02 DIAGNOSIS — I82.511 CHRONIC DEEP VEIN THROMBOSIS (DVT) OF FEMORAL VEIN OF RIGHT LOWER EXTREMITY (HCC): ICD-10-CM

## 2024-10-02 DIAGNOSIS — D68.69 SECONDARY HYPERCOAGULABLE STATE (HCC): ICD-10-CM

## 2024-10-02 DIAGNOSIS — D64.9 ANEMIA, UNSPECIFIED TYPE: ICD-10-CM

## 2024-10-02 DIAGNOSIS — I10 ESSENTIAL HYPERTENSION: ICD-10-CM

## 2024-10-02 DIAGNOSIS — R80.9 TYPE 2 DIABETES MELLITUS WITH MICROALBUMINURIA, WITHOUT LONG-TERM CURRENT USE OF INSULIN (HCC): ICD-10-CM

## 2024-10-02 DIAGNOSIS — E11.29 TYPE 2 DIABETES MELLITUS WITH MICROALBUMINURIA, WITHOUT LONG-TERM CURRENT USE OF INSULIN (HCC): Primary | ICD-10-CM

## 2024-10-02 DIAGNOSIS — R33.8 URINARY RETENTION DUE TO BENIGN PROSTATIC HYPERPLASIA: ICD-10-CM

## 2024-10-02 DIAGNOSIS — I82.409 RECURRENT DEEP VEIN THROMBOSIS (DVT) (HCC): ICD-10-CM

## 2024-10-02 DIAGNOSIS — Z00.00 MEDICARE ANNUAL WELLNESS VISIT, SUBSEQUENT: ICD-10-CM

## 2024-10-02 DIAGNOSIS — E66.3 OVERWEIGHT (BMI 25.0-29.9): ICD-10-CM

## 2024-10-02 DIAGNOSIS — Z71.89 ACP (ADVANCE CARE PLANNING): ICD-10-CM

## 2024-10-02 DIAGNOSIS — E78.00 PURE HYPERCHOLESTEROLEMIA: ICD-10-CM

## 2024-10-02 DIAGNOSIS — R80.9 TYPE 2 DIABETES MELLITUS WITH MICROALBUMINURIA, WITHOUT LONG-TERM CURRENT USE OF INSULIN (HCC): Primary | ICD-10-CM

## 2024-10-02 DIAGNOSIS — E55.9 VITAMIN D INSUFFICIENCY: ICD-10-CM

## 2024-10-02 DIAGNOSIS — Z12.5 PROSTATE CANCER SCREENING: ICD-10-CM

## 2024-10-02 DIAGNOSIS — N40.1 URINARY RETENTION DUE TO BENIGN PROSTATIC HYPERPLASIA: ICD-10-CM

## 2024-10-02 DIAGNOSIS — E11.29 TYPE 2 DIABETES MELLITUS WITH MICROALBUMINURIA, WITHOUT LONG-TERM CURRENT USE OF INSULIN (HCC): ICD-10-CM

## 2024-10-02 LAB
25(OH)D3 SERPL-MCNC: 42 NG/ML (ref 30–100)
ALBUMIN SERPL-MCNC: 3.6 G/DL (ref 3.4–5)
ALBUMIN/GLOB SERPL: 1.4 (ref 0.8–1.7)
ALP SERPL-CCNC: 66 U/L (ref 45–117)
ALT SERPL-CCNC: 26 U/L (ref 16–61)
ANION GAP SERPL CALC-SCNC: 7 MMOL/L (ref 3–18)
AST SERPL-CCNC: 15 U/L (ref 10–38)
BASOPHILS # BLD: 0 K/UL (ref 0–0.1)
BASOPHILS NFR BLD: 0 % (ref 0–2)
BILIRUB SERPL-MCNC: 0.5 MG/DL (ref 0.2–1)
BUN SERPL-MCNC: 14 MG/DL (ref 7–18)
BUN/CREAT SERPL: 13 (ref 12–20)
CALCIUM SERPL-MCNC: 9.2 MG/DL (ref 8.5–10.1)
CHLORIDE SERPL-SCNC: 108 MMOL/L (ref 100–111)
CHOLEST SERPL-MCNC: 129 MG/DL
CO2 SERPL-SCNC: 25 MMOL/L (ref 21–32)
CREAT SERPL-MCNC: 1.07 MG/DL (ref 0.6–1.3)
DIFFERENTIAL METHOD BLD: ABNORMAL
EOSINOPHIL # BLD: 0.1 K/UL (ref 0–0.4)
EOSINOPHIL NFR BLD: 1 % (ref 0–5)
ERYTHROCYTE [DISTWIDTH] IN BLOOD BY AUTOMATED COUNT: 13.1 % (ref 11.6–14.5)
EST. AVERAGE GLUCOSE BLD GHB EST-MCNC: 123 MG/DL
GLOBULIN SER CALC-MCNC: 2.5 G/DL (ref 2–4)
GLUCOSE SERPL-MCNC: 115 MG/DL (ref 74–99)
HBA1C MFR BLD: 5.9 % (ref 4.2–5.6)
HCT VFR BLD AUTO: 37.7 % (ref 36–48)
HDLC SERPL-MCNC: 56 MG/DL (ref 40–60)
HDLC SERPL: 2.3 (ref 0–5)
HGB BLD-MCNC: 11.8 G/DL (ref 13–16)
IMM GRANULOCYTES # BLD AUTO: 0 K/UL (ref 0–0.04)
IMM GRANULOCYTES NFR BLD AUTO: 0 % (ref 0–0.5)
LDLC SERPL CALC-MCNC: 60 MG/DL (ref 0–100)
LIPID PANEL: NORMAL
LYMPHOCYTES # BLD: 1.3 K/UL (ref 0.9–3.6)
LYMPHOCYTES NFR BLD: 36 % (ref 21–52)
MCH RBC QN AUTO: 27.6 PG (ref 24–34)
MCHC RBC AUTO-ENTMCNC: 31.3 G/DL (ref 31–37)
MCV RBC AUTO: 88.1 FL (ref 78–100)
MONOCYTES # BLD: 0.3 K/UL (ref 0.05–1.2)
MONOCYTES NFR BLD: 9 % (ref 3–10)
NEUTS SEG # BLD: 1.9 K/UL (ref 1.8–8)
NEUTS SEG NFR BLD: 53 % (ref 40–73)
NRBC # BLD: 0 K/UL (ref 0–0.01)
NRBC BLD-RTO: 0 PER 100 WBC
PLATELET # BLD AUTO: 190 K/UL (ref 135–420)
PMV BLD AUTO: 11.4 FL (ref 9.2–11.8)
POTASSIUM SERPL-SCNC: 4.2 MMOL/L (ref 3.5–5.5)
PROT SERPL-MCNC: 6.1 G/DL (ref 6.4–8.2)
RBC # BLD AUTO: 4.28 M/UL (ref 4.35–5.65)
SODIUM SERPL-SCNC: 140 MMOL/L (ref 136–145)
TRIGL SERPL-MCNC: 65 MG/DL
VLDLC SERPL CALC-MCNC: 13 MG/DL
WBC # BLD AUTO: 3.5 K/UL (ref 4.6–13.2)

## 2024-10-02 PROCEDURE — 85025 COMPLETE CBC W/AUTO DIFF WBC: CPT

## 2024-10-02 PROCEDURE — 83036 HEMOGLOBIN GLYCOSYLATED A1C: CPT

## 2024-10-02 PROCEDURE — 82306 VITAMIN D 25 HYDROXY: CPT

## 2024-10-02 PROCEDURE — 36415 COLL VENOUS BLD VENIPUNCTURE: CPT

## 2024-10-02 PROCEDURE — 80053 COMPREHEN METABOLIC PANEL: CPT

## 2024-10-02 PROCEDURE — 80061 LIPID PANEL: CPT

## 2024-10-02 RX ORDER — LOSARTAN POTASSIUM 25 MG/1
25 TABLET ORAL DAILY
Qty: 90 TABLET | Refills: 3 | Status: SHIPPED | OUTPATIENT
Start: 2024-10-02

## 2024-10-02 NOTE — PROGRESS NOTES
Medicare Annual Wellness Visit    Vikas Ledesma is here for Medicare AWV    Assessment & Plan   Type 2 diabetes mellitus with microalbuminuria, without long-term current use of insulin (HCC)  -      DIABETES FOOT EXAM  -     Comprehensive Metabolic Panel; Future  -     Hemoglobin A1C; Future  -     Microalbumin / Creatinine Urine Ratio; Future  -     Urinalysis with Microscopic; Future  Essential hypertension  -     Comprehensive Metabolic Panel; Future  -     Magnesium; Future  -     Urinalysis with Microscopic; Future  Pure hypercholesterolemia  -     Comprehensive Metabolic Panel; Future  -     Lipid Panel; Future  Recurrent deep vein thrombosis (DVT) (HCC)  Chronic deep vein thrombosis (DVT) of femoral vein of right lower extremity (HCC)  Secondary hypercoagulable state (HCC)  -     CBC with Auto Differential; Future  Anemia, unspecified type  -     CBC with Auto Differential; Future  Urinary retention due to benign prostatic hyperplasia  Overweight (BMI 25.0-29.9)  Medicare annual wellness visit, subsequent  ACP (advance care planning)  Prostate cancer screening  -     PSA Screening; Future    Recommendations for Preventive Services Due: see orders and patient instructions/AVS.  Recommended screening schedule for the next 5-10 years is provided to the patient in written form: see Patient Instructions/AVS.     Return in about 4 months (around 2/2/2025), or if symptoms worsen or fail to improve.     Subjective   See office progress note for details.      Patient's complete Health Risk Assessment and screening values have been reviewed and are found in Flowsheets. The following problems were reviewed today and where indicated follow up appointments were made and/or referrals ordered.    Positive Risk Factor Screenings with Interventions:       Cognitive:   Clock Drawing Test (CDT): (!) Abnormal  Words recalled: 3 Words Recalled  Total Score: 3  Total Score Interpretation: Normal Mini-Cog  Interventions:  Patient 
Vikas Ledesma presents today for   Chief Complaint   Patient presents with    Medicare AWV       \"Have you been to the ER, urgent care clinic since your last visit?  Hospitalized since your last visit?\"    NO    “Have you seen or consulted any other health care providers outside of [facility] since your last visit?”    NO          
10 % Final    Eosinophils % 10/02/2024 1  0 - 5 % Final    Basophils % 10/02/2024 0  0 - 2 % Final    Immature Granulocytes % 10/02/2024 0  0.0 - 0.5 % Final    Neutrophils Absolute 10/02/2024 1.9  1.8 - 8.0 K/UL Final    Lymphocytes Absolute 10/02/2024 1.3  0.9 - 3.6 K/UL Final    Monocytes Absolute 10/02/2024 0.3  0.05 - 1.2 K/UL Final    Eosinophils Absolute 10/02/2024 0.1  0.0 - 0.4 K/UL Final    Basophils Absolute 10/02/2024 0.0  0.0 - 0.1 K/UL Final    Immature Granulocytes Absolute 10/02/2024 0.0  0.00 - 0.04 K/UL Final    Differential Type 10/02/2024 AUTOMATED    Final    Sodium 10/02/2024 140  136 - 145 mmol/L Final    Potassium 10/02/2024 4.2  3.5 - 5.5 mmol/L Final    Chloride 10/02/2024 108  100 - 111 mmol/L Final    CO2 10/02/2024 25  21 - 32 mmol/L Final    Anion Gap 10/02/2024 7  3.0 - 18 mmol/L Final    Glucose 10/02/2024 115 (H)  74 - 99 mg/dL Final    BUN 10/02/2024 14  7.0 - 18 MG/DL Final    Creatinine 10/02/2024 1.07  0.6 - 1.3 MG/DL Final    BUN/Creatinine Ratio 10/02/2024 13  12 - 20   Final    Est, Glom Filt Rate 10/02/2024 74  >60 ml/min/1.73m2 Final    Calcium 10/02/2024 9.2  8.5 - 10.1 MG/DL Final    Total Bilirubin 10/02/2024 0.5  0.2 - 1.0 MG/DL Final    ALT 10/02/2024 26  16 - 61 U/L Final    AST 10/02/2024 15  10 - 38 U/L Final    Alk Phosphatase 10/02/2024 66  45 - 117 U/L Final    Total Protein 10/02/2024 6.1 (L)  6.4 - 8.2 g/dL Final    Albumin 10/02/2024 3.6  3.4 - 5.0 g/dL Final    Globulin 10/02/2024 2.5  2.0 - 4.0 g/dL Final    Albumin/Globulin Ratio 10/02/2024 1.4  0.8 - 1.7   Final    LIPID PANEL 10/02/2024      Final    Cholesterol, Total 10/02/2024 129  <200 MG/DL Final    Triglycerides 10/02/2024 65  <150 MG/DL Final    HDL 10/02/2024 56  40 - 60 MG/DL Final    LDL Cholesterol 10/02/2024 60  0 - 100 MG/DL Final    VLDL Cholesterol Calculated 10/02/2024 13  MG/DL Final    Chol/HDL Ratio 10/02/2024 2.3  0 - 5.0   Final    Hemoglobin A1C 10/02/2024 5.9 (H)  4.2 - 5.6 % Final

## 2024-10-02 NOTE — ACP (ADVANCE CARE PLANNING)
Advance Care Planning     Advance Care Planning (ACP) Physician/NP/PA Conversation    Date of Conversation: 10/2/2024  Conducted with: Patient with Decision Making Capacity    Healthcare Decision Maker:      Primary Decision Maker: Iona Ledesma - Spouse - [phone]    Secondary Decision Maker: Umberto Tran - Child - [phone]    Click here to complete Healthcare Decision Makers including selection of the Healthcare Decision Maker Relationship (ie \"Primary\")  Today we updated his healthcare decision makers to include his wife and son    Care Preferences:    Hospitalization:  \"If your health worsens and it becomes clear that your chance of recovery is unlikely, what would be your preference regarding hospitalization?\"  The patient would prefer hospitalization.    Ventilation:  \"If you were unable to breath on your own and your chance of recovery was unlikely, what would be your preference about the use of a ventilator (breathing machine) if it was available to you?\"  The patient would desire the use of a ventilator.    Resuscitation:  \"In the event your heart stopped as a result of an underlying serious health condition, would you want attempts made to restart your heart, or would you prefer a natural death?\"  Yes, attempt to resuscitate.    treatment goals, benefit/burden of treatment options, ventilation preferences, hospitalization preferences, resuscitation preferences, and end of life care preferences (vegetative state/imminent death)    Conversation Outcomes / Follow-Up Plan:  ACP complete - no further action today  Reviewed DNR/DNI and patient elects Full Code (Attempt Resuscitation).  Patient states would wish resuscitation efforts if meaningful recovery possible and not deemed futile.    Length of Voluntary ACP Conversation in minutes:  16 minutes    Leatha Sebastian MD

## 2024-10-04 NOTE — RESULT ENCOUNTER NOTE
Reviewed labs obtained at visit.  CBC with evidence of persistent mild anemia which is stable.  Renal function is normal with creatinine 1.07/eGFR 74 and liver function is normal.  Total protein level is mildly low at 6.1.  Vitamin D level is normal.  Lipid panel is improved with total cholesterol 129, HDL 56, and LDL 60 on atorvastatin 40 mg daily.  HbA1c is slightly improved at 5.9 with fasting glucose of 115 on metformin 500 mg twice daily and sitagliptin 100 mg daily.  [employer] message sent to patient with results.

## 2024-10-14 RX ORDER — LOSARTAN POTASSIUM 25 MG/1
25 TABLET ORAL DAILY
Qty: 90 TABLET | Refills: 3 | OUTPATIENT
Start: 2024-10-14

## 2024-12-08 RX ORDER — APIXABAN 5 MG/1
TABLET, FILM COATED ORAL
Qty: 180 TABLET | Refills: 3 | Status: SHIPPED | OUTPATIENT
Start: 2024-12-08

## 2025-01-07 RX ORDER — ATORVASTATIN CALCIUM 40 MG/1
40 TABLET, FILM COATED ORAL DAILY
Qty: 90 TABLET | Refills: 3 | Status: SHIPPED | OUTPATIENT
Start: 2025-01-07

## 2025-02-25 ENCOUNTER — TELEPHONE (OUTPATIENT)
Facility: CLINIC | Age: 75
End: 2025-02-25

## 2025-02-28 LAB
BASOPHILS # BLD AUTO: 0 X10E3/UL (ref 0–0.2)
BASOPHILS NFR BLD AUTO: 1 %
EOSINOPHIL # BLD AUTO: 0.1 X10E3/UL (ref 0–0.4)
EOSINOPHIL NFR BLD AUTO: 2 %
ERYTHROCYTE [DISTWIDTH] IN BLOOD BY AUTOMATED COUNT: 13.1 % (ref 11.6–15.4)
HBA1C MFR BLD: 6.3 % (ref 4.8–5.6)
HCT VFR BLD AUTO: 40.6 % (ref 37.5–51)
HGB BLD-MCNC: 12.4 G/DL (ref 13–17.7)
IMM GRANULOCYTES # BLD AUTO: 0 X10E3/UL (ref 0–0.1)
IMM GRANULOCYTES NFR BLD AUTO: 0 %
LYMPHOCYTES # BLD AUTO: 1.4 X10E3/UL (ref 0.7–3.1)
LYMPHOCYTES NFR BLD AUTO: 38 %
MCH RBC QN AUTO: 26.4 PG (ref 26.6–33)
MCHC RBC AUTO-ENTMCNC: 30.5 G/DL (ref 31.5–35.7)
MCV RBC AUTO: 86 FL (ref 79–97)
MONOCYTES # BLD AUTO: 0.3 X10E3/UL (ref 0.1–0.9)
MONOCYTES NFR BLD AUTO: 9 %
NEUTROPHILS # BLD AUTO: 1.9 X10E3/UL (ref 1.4–7)
NEUTROPHILS NFR BLD AUTO: 50 %
PLATELET # BLD AUTO: 210 X10E3/UL (ref 150–450)
RBC # BLD AUTO: 4.7 X10E6/UL (ref 4.14–5.8)
SPECIMEN STATUS REPORT: NORMAL
WBC # BLD AUTO: 3.7 X10E3/UL (ref 3.4–10.8)

## 2025-03-01 LAB
ALBUMIN SERPL-MCNC: 4.3 G/DL (ref 3.8–4.8)
ALP SERPL-CCNC: 79 IU/L (ref 44–121)
ALT SERPL-CCNC: 18 IU/L (ref 0–44)
APPEARANCE UR: CLEAR
AST SERPL-CCNC: 26 IU/L (ref 0–40)
BACTERIA #/AREA URNS HPF: ABNORMAL /[HPF]
BILIRUB SERPL-MCNC: 0.7 MG/DL (ref 0–1.2)
BILIRUB UR QL STRIP: NEGATIVE
BUN SERPL-MCNC: 19 MG/DL (ref 8–27)
BUN/CREAT SERPL: 15 (ref 10–24)
CALCIUM SERPL-MCNC: 9.7 MG/DL (ref 8.6–10.2)
CASTS URNS QL MICRO: ABNORMAL /LPF
CHLORIDE SERPL-SCNC: 104 MMOL/L (ref 96–106)
CHOLEST SERPL-MCNC: 140 MG/DL (ref 100–199)
CO2 SERPL-SCNC: 24 MMOL/L (ref 20–29)
COLOR UR: YELLOW
CREAT SERPL-MCNC: 1.25 MG/DL (ref 0.76–1.27)
EGFRCR SERPLBLD CKD-EPI 2021: 61 ML/MIN/1.73
EPI CELLS #/AREA URNS HPF: ABNORMAL /HPF (ref 0–10)
GLOBULIN SER CALC-MCNC: 2.4 G/DL (ref 1.5–4.5)
GLUCOSE SERPL-MCNC: 122 MG/DL (ref 70–99)
GLUCOSE UR QL STRIP: NEGATIVE
HDLC SERPL-MCNC: 47 MG/DL
HGB UR QL STRIP: NEGATIVE
KETONES UR QL STRIP: ABNORMAL
LDLC SERPL CALC-MCNC: 82 MG/DL (ref 0–99)
LEUKOCYTE ESTERASE UR QL STRIP: ABNORMAL
MAGNESIUM SERPL-MCNC: 1.8 MG/DL (ref 1.6–2.3)
MICRO URNS: ABNORMAL
NITRITE UR QL STRIP: NEGATIVE
PH UR STRIP: 5.5 [PH] (ref 5–7.5)
POTASSIUM SERPL-SCNC: 4.9 MMOL/L (ref 3.5–5.2)
PROT SERPL-MCNC: 6.7 G/DL (ref 6–8.5)
PROT UR QL STRIP: NEGATIVE
PSA SERPL-MCNC: 1.3 NG/ML (ref 0–4)
RBC #/AREA URNS HPF: ABNORMAL /HPF (ref 0–2)
SODIUM SERPL-SCNC: 142 MMOL/L (ref 134–144)
SP GR UR STRIP: 1.02 (ref 1–1.03)
TRIGL SERPL-MCNC: 51 MG/DL (ref 0–149)
UROBILINOGEN UR STRIP-MCNC: 1 MG/DL (ref 0.2–1)
VLDLC SERPL CALC-MCNC: 11 MG/DL (ref 5–40)
WBC #/AREA URNS HPF: ABNORMAL /HPF (ref 0–5)

## 2025-03-02 LAB
ALBUMIN/CREAT UR: 7 MG/G CREAT (ref 0–29)
CREAT UR-MCNC: 201.2 MG/DL
MICROALBUMIN UR-MCNC: 14.7 UG/ML

## 2025-03-03 SDOH — ECONOMIC STABILITY: FOOD INSECURITY: WITHIN THE PAST 12 MONTHS, YOU WORRIED THAT YOUR FOOD WOULD RUN OUT BEFORE YOU GOT MONEY TO BUY MORE.: NEVER TRUE

## 2025-03-03 SDOH — ECONOMIC STABILITY: INCOME INSECURITY: IN THE LAST 12 MONTHS, WAS THERE A TIME WHEN YOU WERE NOT ABLE TO PAY THE MORTGAGE OR RENT ON TIME?: NO

## 2025-03-03 SDOH — ECONOMIC STABILITY: FOOD INSECURITY: WITHIN THE PAST 12 MONTHS, THE FOOD YOU BOUGHT JUST DIDN'T LAST AND YOU DIDN'T HAVE MONEY TO GET MORE.: NEVER TRUE

## 2025-03-03 SDOH — ECONOMIC STABILITY: TRANSPORTATION INSECURITY
IN THE PAST 12 MONTHS, HAS LACK OF TRANSPORTATION KEPT YOU FROM MEETINGS, WORK, OR FROM GETTING THINGS NEEDED FOR DAILY LIVING?: NO

## 2025-03-03 SDOH — ECONOMIC STABILITY: TRANSPORTATION INSECURITY
IN THE PAST 12 MONTHS, HAS THE LACK OF TRANSPORTATION KEPT YOU FROM MEDICAL APPOINTMENTS OR FROM GETTING MEDICATIONS?: NO

## 2025-03-04 ENCOUNTER — OFFICE VISIT (OUTPATIENT)
Facility: CLINIC | Age: 75
End: 2025-03-04

## 2025-03-04 VITALS
RESPIRATION RATE: 16 BRPM | SYSTOLIC BLOOD PRESSURE: 122 MMHG | HEIGHT: 73 IN | HEART RATE: 74 BPM | BODY MASS INDEX: 25.45 KG/M2 | WEIGHT: 192 LBS | TEMPERATURE: 98.6 F | DIASTOLIC BLOOD PRESSURE: 68 MMHG | OXYGEN SATURATION: 100 %

## 2025-03-04 DIAGNOSIS — I82.511 CHRONIC EMBOLISM AND THROMBOSIS OF RIGHT FEMORAL VEIN (HCC): ICD-10-CM

## 2025-03-04 DIAGNOSIS — E11.29 TYPE 2 DIABETES MELLITUS WITH MICROALBUMINURIA, WITHOUT LONG-TERM CURRENT USE OF INSULIN (HCC): Primary | ICD-10-CM

## 2025-03-04 DIAGNOSIS — D64.9 ANEMIA, UNSPECIFIED TYPE: ICD-10-CM

## 2025-03-04 DIAGNOSIS — E66.3 OVERWEIGHT (BMI 25.0-29.9): ICD-10-CM

## 2025-03-04 DIAGNOSIS — E78.00 PURE HYPERCHOLESTEROLEMIA: ICD-10-CM

## 2025-03-04 DIAGNOSIS — D68.69 SECONDARY HYPERCOAGULABLE STATE: ICD-10-CM

## 2025-03-04 DIAGNOSIS — I10 ESSENTIAL HYPERTENSION: ICD-10-CM

## 2025-03-04 DIAGNOSIS — R33.8 URINARY RETENTION DUE TO BENIGN PROSTATIC HYPERPLASIA: ICD-10-CM

## 2025-03-04 DIAGNOSIS — R80.9 TYPE 2 DIABETES MELLITUS WITH MICROALBUMINURIA, WITHOUT LONG-TERM CURRENT USE OF INSULIN (HCC): Primary | ICD-10-CM

## 2025-03-04 DIAGNOSIS — N40.1 URINARY RETENTION DUE TO BENIGN PROSTATIC HYPERPLASIA: ICD-10-CM

## 2025-03-04 RX ORDER — SITAGLIPTIN 100 MG/1
100 TABLET ORAL DAILY
Qty: 90 TABLET | Refills: 3 | Status: SHIPPED | OUTPATIENT
Start: 2025-03-04 | End: 2025-03-05

## 2025-03-04 RX ORDER — SITAGLIPTIN 100 MG/1
100 TABLET ORAL DAILY
Qty: 90 TABLET | Refills: 3 | Status: CANCELLED | OUTPATIENT
Start: 2025-03-04

## 2025-03-04 ASSESSMENT — PATIENT HEALTH QUESTIONNAIRE - PHQ9
SUM OF ALL RESPONSES TO PHQ QUESTIONS 1-9: 0
2. FEELING DOWN, DEPRESSED OR HOPELESS: NOT AT ALL
SUM OF ALL RESPONSES TO PHQ QUESTIONS 1-9: 0
SUM OF ALL RESPONSES TO PHQ QUESTIONS 1-9: 0
1. LITTLE INTEREST OR PLEASURE IN DOING THINGS: NOT AT ALL
SUM OF ALL RESPONSES TO PHQ QUESTIONS 1-9: 0
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
## 2025-03-04 NOTE — PATIENT INSTRUCTIONS
do other activities, such as running, swimming, cycling, or playing tennis or team sports.  Stay at a healthy weight or lose weight by making the changes in eating and physical activity listed above. Losing just a small amount of weight, even 5 to 10 pounds, can reduce your risk for having a heart attack or stroke.  Do not smoke.  When should you call for help?  Watch closely for changes in your health, and be sure to contact your doctor if:    You need help making lifestyle changes.     You have questions about your medicine.   Where can you learn more?  Go to https://[email or web address].net/[employer]  Enter I865 in the search box to learn more about \"High Cholesterol: Care Instructions.\"  Current as of: December 16, 2019               Content Version: 12.6  © 5981-8957 [employer].   Care instructions adapted under license by [employer] (which disclaims liability or warranty for this information). If you have questions about a medical condition or this instruction, always ask your healthcare professional. [employer] disclaims any warranty or liability for your use of this information.

## 2025-03-05 RX ORDER — SITAGLIPTIN 50 MG/1
100 TABLET ORAL DAILY
Qty: 180 TABLET | Refills: 3 | Status: SHIPPED | OUTPATIENT
Start: 2025-03-05

## 2025-03-05 NOTE — TELEPHONE ENCOUNTER
Zituvio 100 mg does not appear to be on patient's formulary so prescribed 50 mg tablets and advised to take 2 tablets daily.

## 2025-03-06 ENCOUNTER — TELEPHONE (OUTPATIENT)
Facility: CLINIC | Age: 75
End: 2025-03-06

## 2025-04-23 ENCOUNTER — OFFICE VISIT (OUTPATIENT)
Facility: CLINIC | Age: 75
End: 2025-04-23

## 2025-04-23 VITALS
WEIGHT: 188 LBS | BODY MASS INDEX: 24.92 KG/M2 | TEMPERATURE: 98.7 F | HEART RATE: 89 BPM | RESPIRATION RATE: 14 BRPM | SYSTOLIC BLOOD PRESSURE: 127 MMHG | OXYGEN SATURATION: 98 % | HEIGHT: 73 IN | DIASTOLIC BLOOD PRESSURE: 72 MMHG

## 2025-04-23 DIAGNOSIS — I82.511 CHRONIC EMBOLISM AND THROMBOSIS OF RIGHT FEMORAL VEIN (HCC): ICD-10-CM

## 2025-04-23 DIAGNOSIS — E78.00 PURE HYPERCHOLESTEROLEMIA: ICD-10-CM

## 2025-04-23 DIAGNOSIS — Z86.718 HISTORY OF RECURRENT DEEP VEIN THROMBOSIS (DVT): ICD-10-CM

## 2025-04-23 DIAGNOSIS — N40.1 URINARY RETENTION DUE TO BENIGN PROSTATIC HYPERPLASIA: ICD-10-CM

## 2025-04-23 DIAGNOSIS — I10 ESSENTIAL HYPERTENSION: ICD-10-CM

## 2025-04-23 DIAGNOSIS — D68.69 SECONDARY HYPERCOAGULABLE STATE: ICD-10-CM

## 2025-04-23 DIAGNOSIS — Z01.818 PREOPERATIVE EVALUATION TO RULE OUT SURGICAL CONTRAINDICATION: Primary | ICD-10-CM

## 2025-04-23 DIAGNOSIS — R80.9 TYPE 2 DIABETES MELLITUS WITH MICROALBUMINURIA, WITHOUT LONG-TERM CURRENT USE OF INSULIN (HCC): ICD-10-CM

## 2025-04-23 DIAGNOSIS — E11.29 TYPE 2 DIABETES MELLITUS WITH MICROALBUMINURIA, WITHOUT LONG-TERM CURRENT USE OF INSULIN (HCC): ICD-10-CM

## 2025-04-23 DIAGNOSIS — R33.8 URINARY RETENTION DUE TO BENIGN PROSTATIC HYPERPLASIA: ICD-10-CM

## 2025-04-23 NOTE — PROGRESS NOTES
Vikas Ledesma presents today for   Chief Complaint   Patient presents with    Pre-op Exam       \"Have you been to the ER, urgent care clinic since your last visit?  Hospitalized since your last visit?\"    NO    “Have you seen or consulted any other health care providers outside of [facility] since your last visit?”    NO          
colonoscopy (9/7/2022) tubular adenoma. Dr. Velez. Due 9/2027.                Depression: none              DM (HbA1c/FPG): / HbA1c 6.3 (2/2025)              Hepatitis C: negative (9/2020) Dr. Lopez              Falls: none              DEXA: N/A              PSA/DANNI: PSA 1.3 (2/2025) [facility]              Glaucoma: eye exam with Dr. Clarke on 4/14/2025 (bilateral cataracts).              Smoking: distant past              Vitamin D: 42.0 (10/2024)              Medicare Wellness: 10/2/2024        Impression:  Patient Active Problem List   Diagnosis    GERD (gastroesophageal reflux disease)    Family history of prostate cancer    DDD (degenerative disc disease), cervical    Hyperlipidemia    Vitamin D insufficiency    Essential hypertension    Chronic deep vein thrombosis (DVT) of femoral vein of right lower extremity    History of gastric ulcer    Colon polyps    Multiple thyroid nodules    Overweight (BMI 25.0-29.9)    Recurrent deep vein thrombosis (DVT) (HCC)    Urinary retention due to benign prostatic hyperplasia    Type 2 diabetes mellitus with microalbuminuria, without long-term current use of insulin (HCC)    On continuous oral anticoagulation    Anemia       Plan:  Preoperative risk stratification:  Patient scheduled for bilateral cataract surgery by Dr. Clarke on 5/7/2025 (right) and 5/14/2025 (left). Surgery to be performed using local anesthesia and MAC. Scheduled surgery is a low risk procedure and his functional status is very good. Patient is clinically stable and without absolute medical contraindication for surgery.  He is low risk for a irvin-procedural cardiac event. Surgery may proceed as planned at the discretion of Dr. Clarke.      Chronic issues:  1. Hypertension.  Blood pressure remains well-controlled on losartan 25 mg daily. Renal function remains normal with creatinine 1.25/ eGFR 61.  Will continue to follow.  2. Hyperlipidemia.  On moderate intensity dose

## 2025-04-26 ENCOUNTER — TELEPHONE (OUTPATIENT)
Facility: CLINIC | Age: 75
End: 2025-04-26

## 2025-05-05 ENCOUNTER — TELEPHONE (OUTPATIENT)
Facility: CLINIC | Age: 75
End: 2025-05-05

## 2025-05-05 NOTE — TELEPHONE ENCOUNTER
Britt from Dr. Clarke's office requested to have pt's pre-op ov notes faxed to [phone]. Faxed ov notes. Scanned in confirmation fax sheet that notes were faxed over and received. Nothing further needed. DORIS

## 2025-06-03 LAB
ALBUMIN SERPL-MCNC: 4.1 G/DL (ref 3.8–4.8)
ALBUMIN/CREAT UR: 4 MG/G CREAT (ref 0–29)
ALP SERPL-CCNC: 64 IU/L (ref 44–121)
ALT SERPL-CCNC: 11 IU/L (ref 0–44)
AST SERPL-CCNC: 12 IU/L (ref 0–40)
BASOPHILS # BLD AUTO: 0 X10E3/UL (ref 0–0.2)
BASOPHILS NFR BLD AUTO: 1 %
BILIRUB SERPL-MCNC: 0.3 MG/DL (ref 0–1.2)
BUN SERPL-MCNC: 15 MG/DL (ref 8–27)
BUN/CREAT SERPL: 14 (ref 10–24)
CALCIUM SERPL-MCNC: 9.2 MG/DL (ref 8.6–10.2)
CHLORIDE SERPL-SCNC: 108 MMOL/L (ref 96–106)
CHOLEST SERPL-MCNC: 132 MG/DL (ref 100–199)
CO2 SERPL-SCNC: 23 MMOL/L (ref 20–29)
CREAT SERPL-MCNC: 1.04 MG/DL (ref 0.76–1.27)
CREAT UR-MCNC: 159.7 MG/DL
EGFRCR SERPLBLD CKD-EPI 2021: 76 ML/MIN/1.73
EOSINOPHIL # BLD AUTO: 0.1 X10E3/UL (ref 0–0.4)
EOSINOPHIL NFR BLD AUTO: 2 %
ERYTHROCYTE [DISTWIDTH] IN BLOOD BY AUTOMATED COUNT: 13.2 % (ref 11.6–15.4)
GLOBULIN SER CALC-MCNC: 2 G/DL (ref 1.5–4.5)
GLUCOSE SERPL-MCNC: 110 MG/DL (ref 70–99)
HBA1C MFR BLD: 6.2 % (ref 4.8–5.6)
HCT VFR BLD AUTO: 39.8 % (ref 37.5–51)
HDLC SERPL-MCNC: 48 MG/DL
HGB BLD-MCNC: 12.2 G/DL (ref 13–17.7)
IMM GRANULOCYTES # BLD AUTO: 0 X10E3/UL (ref 0–0.1)
IMM GRANULOCYTES NFR BLD AUTO: 0 %
LDLC SERPL CALC-MCNC: 75 MG/DL (ref 0–99)
LYMPHOCYTES # BLD AUTO: 1.3 X10E3/UL (ref 0.7–3.1)
LYMPHOCYTES NFR BLD AUTO: 34 %
MCH RBC QN AUTO: 26.8 PG (ref 26.6–33)
MCHC RBC AUTO-ENTMCNC: 30.7 G/DL (ref 31.5–35.7)
MCV RBC AUTO: 87 FL (ref 79–97)
MICROALBUMIN UR-MCNC: 5.8 UG/ML
MONOCYTES # BLD AUTO: 0.3 X10E3/UL (ref 0.1–0.9)
MONOCYTES NFR BLD AUTO: 7 %
NEUTROPHILS # BLD AUTO: 2.2 X10E3/UL (ref 1.4–7)
NEUTROPHILS NFR BLD AUTO: 56 %
PLATELET # BLD AUTO: 188 X10E3/UL (ref 150–450)
POTASSIUM SERPL-SCNC: 4.3 MMOL/L (ref 3.5–5.2)
PROT SERPL-MCNC: 6.1 G/DL (ref 6–8.5)
RBC # BLD AUTO: 4.56 X10E6/UL (ref 4.14–5.8)
SODIUM SERPL-SCNC: 145 MMOL/L (ref 134–144)
SPECIMEN STATUS REPORT: NORMAL
TRIGL SERPL-MCNC: 35 MG/DL (ref 0–149)
VLDLC SERPL CALC-MCNC: 9 MG/DL (ref 5–40)
WBC # BLD AUTO: 3.9 X10E3/UL (ref 3.4–10.8)

## 2025-06-04 ENCOUNTER — OFFICE VISIT (OUTPATIENT)
Facility: CLINIC | Age: 75
End: 2025-06-04

## 2025-06-04 VITALS
WEIGHT: 179 LBS | HEIGHT: 73 IN | BODY MASS INDEX: 23.72 KG/M2 | OXYGEN SATURATION: 98 % | HEART RATE: 76 BPM | RESPIRATION RATE: 16 BRPM | SYSTOLIC BLOOD PRESSURE: 128 MMHG | DIASTOLIC BLOOD PRESSURE: 81 MMHG | TEMPERATURE: 98.6 F

## 2025-06-04 DIAGNOSIS — R33.8 URINARY RETENTION DUE TO BENIGN PROSTATIC HYPERPLASIA: ICD-10-CM

## 2025-06-04 DIAGNOSIS — R63.4 WEIGHT LOSS, UNINTENTIONAL: ICD-10-CM

## 2025-06-04 DIAGNOSIS — D68.69 SECONDARY HYPERCOAGULABLE STATE: ICD-10-CM

## 2025-06-04 DIAGNOSIS — D64.9 ANEMIA, UNSPECIFIED TYPE: ICD-10-CM

## 2025-06-04 DIAGNOSIS — R80.9 TYPE 2 DIABETES MELLITUS WITH MICROALBUMINURIA, WITHOUT LONG-TERM CURRENT USE OF INSULIN (HCC): Primary | ICD-10-CM

## 2025-06-04 DIAGNOSIS — I10 ESSENTIAL HYPERTENSION: ICD-10-CM

## 2025-06-04 DIAGNOSIS — N40.1 URINARY RETENTION DUE TO BENIGN PROSTATIC HYPERPLASIA: ICD-10-CM

## 2025-06-04 DIAGNOSIS — E11.29 TYPE 2 DIABETES MELLITUS WITH MICROALBUMINURIA, WITHOUT LONG-TERM CURRENT USE OF INSULIN (HCC): Primary | ICD-10-CM

## 2025-06-04 DIAGNOSIS — E78.00 PURE HYPERCHOLESTEROLEMIA: ICD-10-CM

## 2025-06-04 DIAGNOSIS — Z86.718 HISTORY OF RECURRENT DEEP VEIN THROMBOSIS (DVT): ICD-10-CM

## 2025-06-04 DIAGNOSIS — I82.511 CHRONIC EMBOLISM AND THROMBOSIS OF RIGHT FEMORAL VEIN (HCC): ICD-10-CM

## 2025-06-04 DIAGNOSIS — E55.9 VITAMIN D INSUFFICIENCY: ICD-10-CM

## 2025-06-04 ASSESSMENT — PATIENT HEALTH QUESTIONNAIRE - PHQ9
SUM OF ALL RESPONSES TO PHQ QUESTIONS 1-9: 0
1. LITTLE INTEREST OR PLEASURE IN DOING THINGS: NOT AT ALL
SUM OF ALL RESPONSES TO PHQ QUESTIONS 1-9: 0
2. FEELING DOWN, DEPRESSED OR HOPELESS: NOT AT ALL

## 2025-06-04 NOTE — PROGRESS NOTES
Vikas Ledesma presents today for   Chief Complaint   Patient presents with    3 Month Follow-Up       \"Have you been to the ER, urgent care clinic since your last visit?  Hospitalized since your last visit?\"    NO    “Have you seen or consulted any other health care providers outside of [facility] since your last visit?”    NO          
0.8 mg daily and Avodart 0.5 mg daily with PVR 2 cc (8/2024).  Recurrence of acute urinary retention in 11/2024 off Flomax. Completed a follow-up visit with Dr. Martin on 1/6/2025 and surgical options including UroLift and HoLEP again reviewed.  Opted for continued medical management and scheduled for follow-up with Dr. Martin on 7/7/2025.  7. Elevated PSA. PSA increased to 2.5 in 8/2018, but returned to normal at 0.9 in 5/2019. Repeat PSA normalized at 0.5 in 4/2023 and remained stable at 0.5 (2/2024).  Remains normal at 1.3 (2/2025).  Continuing to be followed by urology.  Patient with a strong family history of prostate cancer and will continue to monitor.   8.  Mild anemia.  Noted new onset anemia in 8/2023. On chronic anticoagulation with Eliquis.  Evaluation including iron studies, B12, folate, gammopathy panel and UPEP unremarkable (2/2024).  Repeat (2/2025) with persistent mild anemia with Hb 12.2/HCT 39.8.  Will continue to follow.  9. Allergic rhinitis.  Reported improvement after initiating Zyrtec, Flonase and Tessalon as needed.  However, cough resolved after discontinuing lisinopril.  No further issues today.  10. GERD. Symptomatic control with over the counter antacids. Follow.  11. Overweight.  Weight decreased 13 pounds since last visit.  Patient reports decreased appetite related to cataract surgery and advised to monitor closely.  Emphasized importance of continued attempts at lifestyle modifications, including heart healthy low carbohydrate diet and regular exercise.  Protein levels remain normal today.  Will continue to monitor.  12. Health maintenance.  Completed Pfizer COVID-19 vaccine series and received one Pfizer booster dose and the updated bivalent booster dose. Completed Shingrix vaccine series. Discussed recommended vaccines, including influenza vaccine and updated COVID vaccine.  Already received RSV vaccine. Other immunizations up to date. Colonoscopy up-to-date. Prostate cancer

## (undated) DEVICE — GOWN,REINFORCED,POLY,AURORA,XLARGE,STRL: Brand: MEDLINE

## (undated) DEVICE — KIT CLN UP BON SECOURS MARYV

## (undated) DEVICE — REM POLYHESIVE ADULT PATIENT RETURN ELECTRODE: Brand: VALLEYLAB

## (undated) DEVICE — GAUZE SPONGES,USP TYPE VII GAUZE, 12 PLY: Brand: CURITY

## (undated) DEVICE — 3M™ BAIR PAWS FLEX™ WARMING GOWN, STANDARD, 20 PER CASE 81003: Brand: BAIR PAWS™

## (undated) DEVICE — KENDALL SCD EXPRESS SLEEVES, KNEE LENGTH, MEDIUM: Brand: KENDALL SCD

## (undated) DEVICE — PACK PROCEDURE SURG MAJ W/ BASIN LF

## (undated) DEVICE — (D)GLOVE SURG ORTH 7.5 PWD LTX -- DISC BY MFR USE ITEM 278014

## (undated) DEVICE — NEEDLE HYPO 25GA L1.5IN BVL ORIENTED ECLIPSE

## (undated) DEVICE — THREE-QUARTER SHEET: Brand: CONVERTORS

## (undated) DEVICE — INTENDED FOR TISSUE SEPARATION, AND OTHER PROCEDURES THAT REQUIRE A SHARP SURGICAL BLADE TO PUNCTURE OR CUT.: Brand: BARD-PARKER SAFETY BLADES SIZE 10, STERILE

## (undated) DEVICE — SUTURE VCRL SZ 3-0 L27IN ABSRB VLT CT-2 L26MM 1/2 CIR J332H

## (undated) DEVICE — BLADE ASSEMB CLP HAIR FINE --

## (undated) DEVICE — 3M™ STERI-STRIP™ COMPOUND BENZOIN TINCTURE 40 BAGS/CARTON 4 CARTONS/CASE C1544: Brand: 3M™ STERI-STRIP™

## (undated) DEVICE — SUTURE COAT VCRL PC 5 PRECIS COSM CONVENTIONAL CUT PRIM 3 8 J823H

## (undated) DEVICE — SHEET, DRAPE, SPLIT, STERILE: Brand: MEDLINE

## (undated) DEVICE — TRAY PREP DRY W/ PREM GLV 2 APPL 6 SPNG 2 UNDPD 1 OVERWRAP

## (undated) DEVICE — DRAPE TWL SURG 16X26IN BLU ORB04] ALLCARE INC]

## (undated) DEVICE — 3M™ STERI-STRIP™ REINFORCED ADHESIVE SKIN CLOSURES, R1546, 1/4 IN X 4 IN (6 MM X 100 MM), 10 STRIPS/ENVELOPE: Brand: 3M™ STERI-STRIP™

## (undated) DEVICE — 3M™ TEGADERM™ TRANSPARENT FILM DRESSING FRAME STYLE, 1626W, 4 IN X 4-3/4 IN (10 CM X 12 CM), 50/CT 4CT/CASE: Brand: 3M™ TEGADERM™

## (undated) DEVICE — GAUZE SPONGES,16 PLY: Brand: CURITY

## (undated) DEVICE — FLEX ADVANTAGE 3000CC: Brand: FLEX ADVANTAGE

## (undated) DEVICE — SUTURE VCRL SZ 3-0 L18IN ABSRB UD POLYGLACTIN 910 BRAID TIE J910T